# Patient Record
Sex: MALE | Race: WHITE | NOT HISPANIC OR LATINO | Employment: FULL TIME | URBAN - METROPOLITAN AREA
[De-identification: names, ages, dates, MRNs, and addresses within clinical notes are randomized per-mention and may not be internally consistent; named-entity substitution may affect disease eponyms.]

---

## 2021-10-14 ENCOUNTER — OFFICE VISIT (OUTPATIENT)
Dept: URGENT CARE | Facility: CLINIC | Age: 61
End: 2021-10-14
Payer: COMMERCIAL

## 2021-10-14 VITALS — OXYGEN SATURATION: 95 % | RESPIRATION RATE: 18 BRPM | TEMPERATURE: 98.2 F | HEART RATE: 81 BPM

## 2021-10-14 DIAGNOSIS — B34.9 VIRAL SYNDROME: Primary | ICD-10-CM

## 2021-10-14 PROCEDURE — 99204 OFFICE O/P NEW MOD 45 MIN: CPT | Performed by: PHYSICIAN ASSISTANT

## 2021-10-14 PROCEDURE — U0003 INFECTIOUS AGENT DETECTION BY NUCLEIC ACID (DNA OR RNA); SEVERE ACUTE RESPIRATORY SYNDROME CORONAVIRUS 2 (SARS-COV-2) (CORONAVIRUS DISEASE [COVID-19]), AMPLIFIED PROBE TECHNIQUE, MAKING USE OF HIGH THROUGHPUT TECHNOLOGIES AS DESCRIBED BY CMS-2020-01-R: HCPCS | Performed by: PHYSICIAN ASSISTANT

## 2021-10-14 PROCEDURE — U0005 INFEC AGEN DETEC AMPLI PROBE: HCPCS | Performed by: PHYSICIAN ASSISTANT

## 2021-10-15 LAB — SARS-COV-2 RNA RESP QL NAA+PROBE: NEGATIVE

## 2021-10-18 ENCOUNTER — TELEPHONE (OUTPATIENT)
Dept: URGENT CARE | Facility: CLINIC | Age: 61
End: 2021-10-18

## 2025-02-07 ENCOUNTER — HOSPITAL ENCOUNTER (INPATIENT)
Facility: HOSPITAL | Age: 65
LOS: 3 days | DRG: 377 | End: 2025-02-10
Attending: INTERNAL MEDICINE | Admitting: INTERNAL MEDICINE
Payer: COMMERCIAL

## 2025-02-07 ENCOUNTER — APPOINTMENT (EMERGENCY)
Dept: RADIOLOGY | Facility: HOSPITAL | Age: 65
DRG: 377 | End: 2025-02-07
Payer: COMMERCIAL

## 2025-02-07 ENCOUNTER — APPOINTMENT (EMERGENCY)
Dept: CT IMAGING | Facility: HOSPITAL | Age: 65
DRG: 377 | End: 2025-02-07
Payer: COMMERCIAL

## 2025-02-07 DIAGNOSIS — R04.2 COUGH WITH HEMOPTYSIS: ICD-10-CM

## 2025-02-07 DIAGNOSIS — R93.89 ABNORMAL CT OF THE CHEST: ICD-10-CM

## 2025-02-07 DIAGNOSIS — D62 ACUTE BLOOD LOSS ANEMIA: ICD-10-CM

## 2025-02-07 DIAGNOSIS — K92.1 MELENA: ICD-10-CM

## 2025-02-07 DIAGNOSIS — R57.9 SHOCK (HCC): Primary | ICD-10-CM

## 2025-02-07 DIAGNOSIS — J18.9 ACUTE PNEUMONIA: ICD-10-CM

## 2025-02-07 DIAGNOSIS — R65.21 SEPTIC SHOCK (HCC): ICD-10-CM

## 2025-02-07 DIAGNOSIS — D64.9 NORMOCYTIC ANEMIA: ICD-10-CM

## 2025-02-07 DIAGNOSIS — A41.9 SEPTIC SHOCK (HCC): ICD-10-CM

## 2025-02-07 DIAGNOSIS — K92.2 GI BLEED: ICD-10-CM

## 2025-02-07 PROBLEM — S22.43XD FRACTURE OF MULTIPLE RIBS OF BOTH SIDES WITH ROUTINE HEALING: Status: ACTIVE | Noted: 2025-02-07

## 2025-02-07 PROBLEM — S22.23XA STERNAL MANUBRIAL DISSOCIATION, CLOSED FRACTURE: Status: ACTIVE | Noted: 2025-02-07

## 2025-02-07 PROBLEM — I10 HYPERTENSION: Status: ACTIVE | Noted: 2025-02-07

## 2025-02-07 PROBLEM — E87.1 HYPONATREMIA: Status: ACTIVE | Noted: 2025-02-07

## 2025-02-07 LAB
2HR DELTA HS TROPONIN: 11 NG/L
4HR DELTA HS TROPONIN: 15 NG/L
ABO GROUP BLD: NORMAL
ABO GROUP BLD: NORMAL
ALBUMIN SERPL BCG-MCNC: 2.7 G/DL (ref 3.5–5)
ALP SERPL-CCNC: 66 U/L (ref 34–104)
ALT SERPL W P-5'-P-CCNC: 16 U/L (ref 7–52)
ANION GAP SERPL CALCULATED.3IONS-SCNC: 13 MMOL/L (ref 4–13)
APTT PPP: 27 SECONDS (ref 23–34)
AST SERPL W P-5'-P-CCNC: 12 U/L (ref 13–39)
BACTERIA UR QL AUTO: ABNORMAL /HPF
BASOPHILS # BLD AUTO: 0.02 THOUSANDS/ΜL (ref 0–0.1)
BASOPHILS NFR BLD AUTO: 0 % (ref 0–1)
BILIRUB DIRECT SERPL-MCNC: 0.05 MG/DL (ref 0–0.2)
BILIRUB SERPL-MCNC: 0.27 MG/DL (ref 0.2–1)
BILIRUB UR QL STRIP: NEGATIVE
BLD GP AB SCN SERPL QL: NEGATIVE
BUN SERPL-MCNC: 49 MG/DL (ref 5–25)
CALCIUM SERPL-MCNC: 8.3 MG/DL (ref 8.4–10.2)
CARDIAC TROPONIN I PNL SERPL HS: 17 NG/L (ref ?–50)
CARDIAC TROPONIN I PNL SERPL HS: 28 NG/L (ref ?–50)
CARDIAC TROPONIN I PNL SERPL HS: 32 NG/L (ref ?–50)
CHLORIDE SERPL-SCNC: 93 MMOL/L (ref 96–108)
CLARITY UR: CLEAR
CO2 SERPL-SCNC: 22 MMOL/L (ref 21–32)
COLOR UR: YELLOW
CREAT SERPL-MCNC: 1.07 MG/DL (ref 0.6–1.3)
EOSINOPHIL # BLD AUTO: 0 THOUSAND/ΜL (ref 0–0.61)
EOSINOPHIL NFR BLD AUTO: 0 % (ref 0–6)
ERYTHROCYTE [DISTWIDTH] IN BLOOD BY AUTOMATED COUNT: 13.6 % (ref 11.6–15.1)
FLUAV RNA RESP QL NAA+PROBE: NEGATIVE
FLUBV RNA RESP QL NAA+PROBE: NEGATIVE
GFR SERPL CREATININE-BSD FRML MDRD: 72 ML/MIN/1.73SQ M
GLUCOSE SERPL-MCNC: 201 MG/DL (ref 65–140)
GLUCOSE UR STRIP-MCNC: NEGATIVE MG/DL
HCT VFR BLD AUTO: 18.1 % (ref 36.5–49.3)
HCT VFR BLD AUTO: 19.4 % (ref 36.5–49.3)
HGB BLD-MCNC: 6 G/DL (ref 12–17)
HGB BLD-MCNC: 6.2 G/DL (ref 12–17)
HGB UR QL STRIP.AUTO: ABNORMAL
HYALINE CASTS #/AREA URNS LPF: ABNORMAL /LPF
IMM GRANULOCYTES # BLD AUTO: 0.32 THOUSAND/UL (ref 0–0.2)
IMM GRANULOCYTES NFR BLD AUTO: 1 % (ref 0–2)
INR PPP: 1.32 (ref 0.85–1.19)
KETONES UR STRIP-MCNC: NEGATIVE MG/DL
LACTATE SERPL-SCNC: 1.2 MMOL/L (ref 0.5–2)
LACTATE SERPL-SCNC: 6.8 MMOL/L (ref 0.5–2)
LEUKOCYTE ESTERASE UR QL STRIP: NEGATIVE
LIPASE SERPL-CCNC: 30 U/L (ref 11–82)
LYMPHOCYTES # BLD AUTO: 2.48 THOUSANDS/ΜL (ref 0.6–4.47)
LYMPHOCYTES NFR BLD AUTO: 11 % (ref 14–44)
MCH RBC QN AUTO: 29.5 PG (ref 26.8–34.3)
MCHC RBC AUTO-ENTMCNC: 32 G/DL (ref 31.4–37.4)
MCV RBC AUTO: 92 FL (ref 82–98)
MONOCYTES # BLD AUTO: 2.39 THOUSAND/ΜL (ref 0.17–1.22)
MONOCYTES NFR BLD AUTO: 11 % (ref 4–12)
NEUTROPHILS # BLD AUTO: 17.34 THOUSANDS/ΜL (ref 1.85–7.62)
NEUTS SEG NFR BLD AUTO: 77 % (ref 43–75)
NITRITE UR QL STRIP: NEGATIVE
NON-SQ EPI CELLS URNS QL MICRO: ABNORMAL /HPF
NRBC BLD AUTO-RTO: 0 /100 WBCS
PH UR STRIP.AUTO: 6 [PH]
PLATELET # BLD AUTO: 510 THOUSANDS/UL (ref 149–390)
PMV BLD AUTO: 9.1 FL (ref 8.9–12.7)
POTASSIUM SERPL-SCNC: 3.9 MMOL/L (ref 3.5–5.3)
PROCALCITONIN SERPL-MCNC: 0.98 NG/ML
PROT SERPL-MCNC: 5.7 G/DL (ref 6.4–8.4)
PROT UR STRIP-MCNC: NEGATIVE MG/DL
PROTHROMBIN TIME: 16.8 SECONDS (ref 12.3–15)
RBC # BLD AUTO: 2.1 MILLION/UL (ref 3.88–5.62)
RBC #/AREA URNS AUTO: ABNORMAL /HPF
RH BLD: POSITIVE
RH BLD: POSITIVE
RSV RNA RESP QL NAA+PROBE: NEGATIVE
SARS-COV-2 RNA RESP QL NAA+PROBE: NEGATIVE
SODIUM SERPL-SCNC: 128 MMOL/L (ref 135–147)
SP GR UR STRIP.AUTO: <=1.005
SPECIMEN EXPIRATION DATE: NORMAL
UROBILINOGEN UR QL STRIP.AUTO: 0.2 E.U./DL
WBC # BLD AUTO: 22.55 THOUSAND/UL (ref 4.31–10.16)
WBC #/AREA URNS AUTO: ABNORMAL /HPF

## 2025-02-07 PROCEDURE — 85025 COMPLETE CBC W/AUTO DIFF WBC: CPT | Performed by: PHYSICIAN ASSISTANT

## 2025-02-07 PROCEDURE — 85730 THROMBOPLASTIN TIME PARTIAL: CPT | Performed by: PHYSICIAN ASSISTANT

## 2025-02-07 PROCEDURE — 87449 NOS EACH ORGANISM AG IA: CPT

## 2025-02-07 PROCEDURE — 74174 CTA ABD&PLVS W/CONTRAST: CPT

## 2025-02-07 PROCEDURE — 36430 TRANSFUSION BLD/BLD COMPNT: CPT

## 2025-02-07 PROCEDURE — 86923 COMPATIBILITY TEST ELECTRIC: CPT

## 2025-02-07 PROCEDURE — 84145 PROCALCITONIN (PCT): CPT | Performed by: PHYSICIAN ASSISTANT

## 2025-02-07 PROCEDURE — 87081 CULTURE SCREEN ONLY: CPT

## 2025-02-07 PROCEDURE — 93005 ELECTROCARDIOGRAM TRACING: CPT

## 2025-02-07 PROCEDURE — 85018 HEMOGLOBIN: CPT

## 2025-02-07 PROCEDURE — 36415 COLL VENOUS BLD VENIPUNCTURE: CPT | Performed by: PHYSICIAN ASSISTANT

## 2025-02-07 PROCEDURE — P9016 RBC LEUKOCYTES REDUCED: HCPCS

## 2025-02-07 PROCEDURE — 71045 X-RAY EXAM CHEST 1 VIEW: CPT

## 2025-02-07 PROCEDURE — 96374 THER/PROPH/DIAG INJ IV PUSH: CPT

## 2025-02-07 PROCEDURE — 84484 ASSAY OF TROPONIN QUANT: CPT | Performed by: PHYSICIAN ASSISTANT

## 2025-02-07 PROCEDURE — 84484 ASSAY OF TROPONIN QUANT: CPT

## 2025-02-07 PROCEDURE — 96365 THER/PROPH/DIAG IV INF INIT: CPT

## 2025-02-07 PROCEDURE — 71275 CT ANGIOGRAPHY CHEST: CPT

## 2025-02-07 PROCEDURE — 99285 EMERGENCY DEPT VISIT HI MDM: CPT

## 2025-02-07 PROCEDURE — 86900 BLOOD TYPING SEROLOGIC ABO: CPT | Performed by: PHYSICIAN ASSISTANT

## 2025-02-07 PROCEDURE — 85014 HEMATOCRIT: CPT

## 2025-02-07 PROCEDURE — 99223 1ST HOSP IP/OBS HIGH 75: CPT

## 2025-02-07 PROCEDURE — 86901 BLOOD TYPING SEROLOGIC RH(D): CPT | Performed by: PHYSICIAN ASSISTANT

## 2025-02-07 PROCEDURE — 83605 ASSAY OF LACTIC ACID: CPT | Performed by: PHYSICIAN ASSISTANT

## 2025-02-07 PROCEDURE — 80076 HEPATIC FUNCTION PANEL: CPT | Performed by: PHYSICIAN ASSISTANT

## 2025-02-07 PROCEDURE — 99291 CRITICAL CARE FIRST HOUR: CPT | Performed by: PHYSICIAN ASSISTANT

## 2025-02-07 PROCEDURE — 0241U HB NFCT DS VIR RESP RNA 4 TRGT: CPT

## 2025-02-07 PROCEDURE — 80048 BASIC METABOLIC PNL TOTAL CA: CPT | Performed by: PHYSICIAN ASSISTANT

## 2025-02-07 PROCEDURE — 86850 RBC ANTIBODY SCREEN: CPT | Performed by: PHYSICIAN ASSISTANT

## 2025-02-07 PROCEDURE — 85610 PROTHROMBIN TIME: CPT | Performed by: PHYSICIAN ASSISTANT

## 2025-02-07 PROCEDURE — 87040 BLOOD CULTURE FOR BACTERIA: CPT | Performed by: PHYSICIAN ASSISTANT

## 2025-02-07 PROCEDURE — 81001 URINALYSIS AUTO W/SCOPE: CPT | Performed by: PHYSICIAN ASSISTANT

## 2025-02-07 PROCEDURE — 96361 HYDRATE IV INFUSION ADD-ON: CPT

## 2025-02-07 PROCEDURE — 83690 ASSAY OF LIPASE: CPT | Performed by: PHYSICIAN ASSISTANT

## 2025-02-07 PROCEDURE — 86920 COMPATIBILITY TEST SPIN: CPT

## 2025-02-07 RX ORDER — METOCLOPRAMIDE HYDROCHLORIDE 5 MG/ML
10 INJECTION INTRAMUSCULAR; INTRAVENOUS ONCE
Status: COMPLETED | OUTPATIENT
Start: 2025-02-08 | End: 2025-02-08

## 2025-02-07 RX ORDER — METOCLOPRAMIDE HYDROCHLORIDE 5 MG/ML
10 INJECTION INTRAMUSCULAR; INTRAVENOUS ONCE
Status: COMPLETED | OUTPATIENT
Start: 2025-02-07 | End: 2025-02-07

## 2025-02-07 RX ORDER — VANCOMYCIN HYDROCHLORIDE 1 G/200ML
15 INJECTION, SOLUTION INTRAVENOUS EVERY 24 HOURS
Status: DISCONTINUED | OUTPATIENT
Start: 2025-02-07 | End: 2025-02-07

## 2025-02-07 RX ORDER — VANCOMYCIN HYDROCHLORIDE 750 MG/150ML
750 INJECTION, SOLUTION INTRAVENOUS EVERY 12 HOURS
Status: DISCONTINUED | OUTPATIENT
Start: 2025-02-08 | End: 2025-02-08

## 2025-02-07 RX ORDER — CHLORHEXIDINE GLUCONATE ORAL RINSE 1.2 MG/ML
15 SOLUTION DENTAL EVERY 12 HOURS SCHEDULED
Status: DISCONTINUED | OUTPATIENT
Start: 2025-02-07 | End: 2025-02-10 | Stop reason: HOSPADM

## 2025-02-07 RX ORDER — PANTOPRAZOLE SODIUM 40 MG/10ML
40 INJECTION, POWDER, LYOPHILIZED, FOR SOLUTION INTRAVENOUS ONCE
Status: COMPLETED | OUTPATIENT
Start: 2025-02-07 | End: 2025-02-07

## 2025-02-07 RX ADMIN — SODIUM CHLORIDE 1000 ML: 0.9 INJECTION, SOLUTION INTRAVENOUS at 18:26

## 2025-02-07 RX ADMIN — NOREPINEPHRINE BITARTRATE 10 MCG/MIN: 1 INJECTION, SOLUTION, CONCENTRATE INTRAVENOUS at 19:51

## 2025-02-07 RX ADMIN — METOCLOPRAMIDE 10 MG: 5 INJECTION, SOLUTION INTRAMUSCULAR; INTRAVENOUS at 22:17

## 2025-02-07 RX ADMIN — SODIUM CHLORIDE 8 MG/HR: 9 INJECTION, SOLUTION INTRAVENOUS at 21:38

## 2025-02-07 RX ADMIN — PANTOPRAZOLE SODIUM 40 MG: 40 INJECTION, POWDER, FOR SOLUTION INTRAVENOUS at 19:15

## 2025-02-07 RX ADMIN — VANCOMYCIN HYDROCHLORIDE 1500 MG: 1 INJECTION, POWDER, LYOPHILIZED, FOR SOLUTION INTRAVENOUS at 21:00

## 2025-02-07 RX ADMIN — CHLORHEXIDINE GLUCONATE 15 ML: 1.2 SOLUTION ORAL at 22:17

## 2025-02-07 RX ADMIN — SODIUM CHLORIDE 1000 ML: 0.9 INJECTION, SOLUTION INTRAVENOUS at 18:20

## 2025-02-07 RX ADMIN — IOHEXOL 100 ML: 350 INJECTION, SOLUTION INTRAVENOUS at 19:09

## 2025-02-07 RX ADMIN — CEFEPIME 2000 MG: 2 INJECTION, POWDER, FOR SOLUTION INTRAVENOUS at 19:15
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
## 2025-02-08 ENCOUNTER — ANESTHESIA (OUTPATIENT)
Dept: ANESTHESIOLOGY | Facility: HOSPITAL | Age: 65
End: 2025-02-08

## 2025-02-08 ENCOUNTER — ANESTHESIA EVENT (INPATIENT)
Dept: GASTROENTEROLOGY | Facility: HOSPITAL | Age: 65
DRG: 377 | End: 2025-02-08
Payer: COMMERCIAL

## 2025-02-08 ENCOUNTER — ANESTHESIA (INPATIENT)
Dept: GASTROENTEROLOGY | Facility: HOSPITAL | Age: 65
DRG: 377 | End: 2025-02-08
Payer: COMMERCIAL

## 2025-02-08 ENCOUNTER — APPOINTMENT (INPATIENT)
Dept: GASTROENTEROLOGY | Facility: HOSPITAL | Age: 65
DRG: 377 | End: 2025-02-08
Attending: INTERNAL MEDICINE
Payer: COMMERCIAL

## 2025-02-08 ENCOUNTER — ANESTHESIA EVENT (OUTPATIENT)
Dept: ANESTHESIOLOGY | Facility: HOSPITAL | Age: 65
End: 2025-02-08

## 2025-02-08 PROBLEM — Z12.11 COLON CANCER SCREENING: Status: ACTIVE | Noted: 2025-02-08

## 2025-02-08 PROBLEM — K92.1 MELENA: Status: ACTIVE | Noted: 2025-02-08

## 2025-02-08 PROBLEM — F10.10 ALCOHOL ABUSE: Status: ACTIVE | Noted: 2025-02-08

## 2025-02-08 PROBLEM — R04.2 HEMOPTYSIS: Status: ACTIVE | Noted: 2025-02-08

## 2025-02-08 LAB
ABO GROUP BLD BPU: NORMAL
ALBUMIN SERPL BCG-MCNC: 2.6 G/DL (ref 3.5–5)
ALP SERPL-CCNC: 56 U/L (ref 34–104)
ALT SERPL W P-5'-P-CCNC: 13 U/L (ref 7–52)
ANION GAP SERPL CALCULATED.3IONS-SCNC: 3 MMOL/L (ref 4–13)
ANION GAP SERPL CALCULATED.3IONS-SCNC: 7 MMOL/L (ref 4–13)
AST SERPL W P-5'-P-CCNC: 11 U/L (ref 13–39)
BASOPHILS # BLD AUTO: 0.04 THOUSANDS/ΜL (ref 0–0.1)
BASOPHILS NFR BLD AUTO: 0 % (ref 0–1)
BILIRUB SERPL-MCNC: 0.68 MG/DL (ref 0.2–1)
BPU ID: NORMAL
BUN SERPL-MCNC: 32 MG/DL (ref 5–25)
BUN SERPL-MCNC: 40 MG/DL (ref 5–25)
CA-I BLD-SCNC: 1.06 MMOL/L (ref 1.12–1.32)
CA-I BLD-SCNC: 1.1 MMOL/L (ref 1.12–1.32)
CALCIUM ALBUM COR SERPL-MCNC: 9.4 MG/DL (ref 8.3–10.1)
CALCIUM SERPL-MCNC: 6.8 MG/DL (ref 8.4–10.2)
CALCIUM SERPL-MCNC: 8.3 MG/DL (ref 8.4–10.2)
CHLORIDE SERPL-SCNC: 102 MMOL/L (ref 96–108)
CHLORIDE SERPL-SCNC: 107 MMOL/L (ref 96–108)
CO2 SERPL-SCNC: 24 MMOL/L (ref 21–32)
CO2 SERPL-SCNC: 25 MMOL/L (ref 21–32)
CREAT SERPL-MCNC: 0.56 MG/DL (ref 0.6–1.3)
CREAT SERPL-MCNC: 0.73 MG/DL (ref 0.6–1.3)
CROSSMATCH: NORMAL
EOSINOPHIL # BLD AUTO: 0.02 THOUSAND/ΜL (ref 0–0.61)
EOSINOPHIL NFR BLD AUTO: 0 % (ref 0–6)
ERYTHROCYTE [DISTWIDTH] IN BLOOD BY AUTOMATED COUNT: 14.7 % (ref 11.6–15.1)
GFR SERPL CREATININE-BSD FRML MDRD: 109 ML/MIN/1.73SQ M
GFR SERPL CREATININE-BSD FRML MDRD: 98 ML/MIN/1.73SQ M
GLUCOSE SERPL-MCNC: 104 MG/DL (ref 65–140)
GLUCOSE SERPL-MCNC: 131 MG/DL (ref 65–140)
HCT VFR BLD AUTO: 27 % (ref 36.5–49.3)
HGB BLD-MCNC: 5.5 G/DL (ref 12–17)
HGB BLD-MCNC: 6.5 G/DL (ref 12–17)
HGB BLD-MCNC: 8.1 G/DL (ref 12–17)
HGB BLD-MCNC: 9.1 G/DL (ref 12–17)
IMM GRANULOCYTES # BLD AUTO: 0.16 THOUSAND/UL (ref 0–0.2)
IMM GRANULOCYTES NFR BLD AUTO: 1 % (ref 0–2)
L PNEUMO1 AG UR QL IA.RAPID: NEGATIVE
LYMPHOCYTES # BLD AUTO: 1.31 THOUSANDS/ΜL (ref 0.6–4.47)
LYMPHOCYTES NFR BLD AUTO: 6 % (ref 14–44)
MAGNESIUM SERPL-MCNC: 1.6 MG/DL (ref 1.9–2.7)
MAGNESIUM SERPL-MCNC: 2.1 MG/DL (ref 1.9–2.7)
MCH RBC QN AUTO: 29.2 PG (ref 26.8–34.3)
MCHC RBC AUTO-ENTMCNC: 33.7 G/DL (ref 31.4–37.4)
MCV RBC AUTO: 87 FL (ref 82–98)
MONOCYTES # BLD AUTO: 1.64 THOUSAND/ΜL (ref 0.17–1.22)
MONOCYTES NFR BLD AUTO: 7 % (ref 4–12)
NEUTROPHILS # BLD AUTO: 20.04 THOUSANDS/ΜL (ref 1.85–7.62)
NEUTS SEG NFR BLD AUTO: 86 % (ref 43–75)
NRBC BLD AUTO-RTO: 0 /100 WBCS
PHOSPHATE SERPL-MCNC: 2.3 MG/DL (ref 2.3–4.1)
PHOSPHATE SERPL-MCNC: 3.6 MG/DL (ref 2.3–4.1)
PLATELET # BLD AUTO: 369 THOUSANDS/UL (ref 149–390)
PMV BLD AUTO: 8.7 FL (ref 8.9–12.7)
POTASSIUM SERPL-SCNC: 3.9 MMOL/L (ref 3.5–5.3)
POTASSIUM SERPL-SCNC: 4 MMOL/L (ref 3.5–5.3)
PROCALCITONIN SERPL-MCNC: 1.05 NG/ML
PROT SERPL-MCNC: 5.2 G/DL (ref 6.4–8.4)
RBC # BLD AUTO: 3.12 MILLION/UL (ref 3.88–5.62)
S PNEUM AG UR QL: NEGATIVE
SODIUM SERPL-SCNC: 133 MMOL/L (ref 135–147)
SODIUM SERPL-SCNC: 135 MMOL/L (ref 135–147)
UNIT DISPENSE STATUS: NORMAL
UNIT PRODUCT CODE: NORMAL
UNIT PRODUCT VOLUME: 350 ML
UNIT RH: NORMAL
WBC # BLD AUTO: 23.21 THOUSAND/UL (ref 4.31–10.16)

## 2025-02-08 PROCEDURE — 80048 BASIC METABOLIC PNL TOTAL CA: CPT | Performed by: INTERNAL MEDICINE

## 2025-02-08 PROCEDURE — 84100 ASSAY OF PHOSPHORUS: CPT | Performed by: INTERNAL MEDICINE

## 2025-02-08 PROCEDURE — 43235 EGD DIAGNOSTIC BRUSH WASH: CPT | Performed by: INTERNAL MEDICINE

## 2025-02-08 PROCEDURE — 84100 ASSAY OF PHOSPHORUS: CPT

## 2025-02-08 PROCEDURE — 0DJ08ZZ INSPECTION OF UPPER INTESTINAL TRACT, VIA NATURAL OR ARTIFICIAL OPENING ENDOSCOPIC: ICD-10-PCS | Performed by: INTERNAL MEDICINE

## 2025-02-08 PROCEDURE — 99291 CRITICAL CARE FIRST HOUR: CPT | Performed by: INTERNAL MEDICINE

## 2025-02-08 PROCEDURE — P9016 RBC LEUKOCYTES REDUCED: HCPCS

## 2025-02-08 PROCEDURE — 85025 COMPLETE CBC W/AUTO DIFF WBC: CPT

## 2025-02-08 PROCEDURE — 85018 HEMOGLOBIN: CPT | Performed by: INTERNAL MEDICINE

## 2025-02-08 PROCEDURE — 80053 COMPREHEN METABOLIC PANEL: CPT

## 2025-02-08 PROCEDURE — 84145 PROCALCITONIN (PCT): CPT

## 2025-02-08 PROCEDURE — 83550 IRON BINDING TEST: CPT | Performed by: NURSE PRACTITIONER

## 2025-02-08 PROCEDURE — 83735 ASSAY OF MAGNESIUM: CPT | Performed by: INTERNAL MEDICINE

## 2025-02-08 PROCEDURE — 82330 ASSAY OF CALCIUM: CPT | Performed by: INTERNAL MEDICINE

## 2025-02-08 PROCEDURE — 82728 ASSAY OF FERRITIN: CPT | Performed by: NURSE PRACTITIONER

## 2025-02-08 PROCEDURE — 85018 HEMOGLOBIN: CPT | Performed by: NURSE PRACTITIONER

## 2025-02-08 PROCEDURE — 82746 ASSAY OF FOLIC ACID SERUM: CPT | Performed by: NURSE PRACTITIONER

## 2025-02-08 PROCEDURE — 83735 ASSAY OF MAGNESIUM: CPT

## 2025-02-08 PROCEDURE — 82607 VITAMIN B-12: CPT | Performed by: NURSE PRACTITIONER

## 2025-02-08 PROCEDURE — 83540 ASSAY OF IRON: CPT | Performed by: NURSE PRACTITIONER

## 2025-02-08 PROCEDURE — 82330 ASSAY OF CALCIUM: CPT

## 2025-02-08 RX ORDER — VALSARTAN AND HYDROCHLOROTHIAZIDE 160; 25 MG/1; MG/1
1 TABLET ORAL DAILY
COMMUNITY
Start: 2025-01-14 | End: 2025-02-19

## 2025-02-08 RX ORDER — SODIUM CHLORIDE, SODIUM LACTATE, POTASSIUM CHLORIDE, CALCIUM CHLORIDE 600; 310; 30; 20 MG/100ML; MG/100ML; MG/100ML; MG/100ML
125 INJECTION, SOLUTION INTRAVENOUS CONTINUOUS
Status: DISCONTINUED | OUTPATIENT
Start: 2025-02-08 | End: 2025-02-08

## 2025-02-08 RX ORDER — CALCIUM GLUCONATE 20 MG/ML
2 INJECTION, SOLUTION INTRAVENOUS ONCE
Status: COMPLETED | OUTPATIENT
Start: 2025-02-08 | End: 2025-02-08

## 2025-02-08 RX ORDER — PROPOFOL 10 MG/ML
INJECTION, EMULSION INTRAVENOUS AS NEEDED
Status: DISCONTINUED | OUTPATIENT
Start: 2025-02-08 | End: 2025-02-08

## 2025-02-08 RX ORDER — ONDANSETRON 2 MG/ML
4 INJECTION INTRAMUSCULAR; INTRAVENOUS EVERY 6 HOURS PRN
Status: DISCONTINUED | OUTPATIENT
Start: 2025-02-08 | End: 2025-02-10 | Stop reason: HOSPADM

## 2025-02-08 RX ORDER — LIDOCAINE HYDROCHLORIDE 20 MG/ML
INJECTION, SOLUTION EPIDURAL; INFILTRATION; INTRACAUDAL; PERINEURAL AS NEEDED
Status: DISCONTINUED | OUTPATIENT
Start: 2025-02-08 | End: 2025-02-08

## 2025-02-08 RX ORDER — SODIUM CHLORIDE 9 MG/ML
100 INJECTION, SOLUTION INTRAVENOUS CONTINUOUS
Status: DISCONTINUED | OUTPATIENT
Start: 2025-02-08 | End: 2025-02-09

## 2025-02-08 RX ORDER — MAGNESIUM SULFATE HEPTAHYDRATE 40 MG/ML
2 INJECTION, SOLUTION INTRAVENOUS ONCE
Status: COMPLETED | OUTPATIENT
Start: 2025-02-08 | End: 2025-02-08

## 2025-02-08 RX ORDER — CEFTRIAXONE 1 G/50ML
1000 INJECTION, SOLUTION INTRAVENOUS EVERY 24 HOURS
Status: DISCONTINUED | OUTPATIENT
Start: 2025-02-08 | End: 2025-02-10 | Stop reason: HOSPADM

## 2025-02-08 RX ORDER — PHENYLEPHRINE HCL IN 0.9% NACL 1 MG/10 ML
SYRINGE (ML) INTRAVENOUS AS NEEDED
Status: DISCONTINUED | OUTPATIENT
Start: 2025-02-08 | End: 2025-02-08

## 2025-02-08 RX ORDER — VANCOMYCIN HYDROCHLORIDE 1 G/200ML
1000 INJECTION, SOLUTION INTRAVENOUS EVERY 12 HOURS
Status: DISCONTINUED | OUTPATIENT
Start: 2025-02-08 | End: 2025-02-08

## 2025-02-08 RX ADMIN — CHLORHEXIDINE GLUCONATE 15 ML: 1.2 SOLUTION ORAL at 20:28

## 2025-02-08 RX ADMIN — CEFTRIAXONE 1000 MG: 1 INJECTION, SOLUTION INTRAVENOUS at 15:14

## 2025-02-08 RX ADMIN — SODIUM CHLORIDE 8 MG/HR: 9 INJECTION, SOLUTION INTRAVENOUS at 15:04

## 2025-02-08 RX ADMIN — METOCLOPRAMIDE 10 MG: 5 INJECTION, SOLUTION INTRAMUSCULAR; INTRAVENOUS at 04:54

## 2025-02-08 RX ADMIN — PROPOFOL 140 MG: 10 INJECTION, EMULSION INTRAVENOUS at 11:46

## 2025-02-08 RX ADMIN — CALCIUM GLUCONATE 2 G: 20 INJECTION, SOLUTION INTRAVENOUS at 16:36

## 2025-02-08 RX ADMIN — VANCOMYCIN HYDROCHLORIDE 1250 MG: 1 INJECTION, POWDER, LYOPHILIZED, FOR SOLUTION INTRAVENOUS at 12:17

## 2025-02-08 RX ADMIN — Medication 300 MCG: at 11:51

## 2025-02-08 RX ADMIN — SODIUM CHLORIDE 100 ML/HR: 0.9 INJECTION, SOLUTION INTRAVENOUS at 23:11

## 2025-02-08 RX ADMIN — SODIUM CHLORIDE 100 ML/HR: 0.9 INJECTION, SOLUTION INTRAVENOUS at 13:43

## 2025-02-08 RX ADMIN — Medication 100 MCG: at 11:46

## 2025-02-08 RX ADMIN — CHLORHEXIDINE GLUCONATE 15 ML: 1.2 SOLUTION ORAL at 08:38

## 2025-02-08 RX ADMIN — MAGNESIUM SULFATE HEPTAHYDRATE 2 G: 40 INJECTION, SOLUTION INTRAVENOUS at 08:38

## 2025-02-08 RX ADMIN — SODIUM CHLORIDE 8 MG/HR: 9 INJECTION, SOLUTION INTRAVENOUS at 05:59

## 2025-02-08 RX ADMIN — SODIUM CHLORIDE 500 ML: 0.9 INJECTION, SOLUTION INTRAVENOUS at 12:05

## 2025-02-08 RX ADMIN — ONDANSETRON 4 MG: 2 INJECTION INTRAMUSCULAR; INTRAVENOUS at 13:16

## 2025-02-08 RX ADMIN — SODIUM CHLORIDE, SODIUM LACTATE, POTASSIUM CHLORIDE, AND CALCIUM CHLORIDE 125 ML/HR: .6; .31; .03; .02 INJECTION, SOLUTION INTRAVENOUS at 11:03

## 2025-02-08 RX ADMIN — Medication 200 MCG: at 11:49

## 2025-02-08 RX ADMIN — LIDOCAINE HYDROCHLORIDE 100 MG: 20 INJECTION, SOLUTION EPIDURAL; INFILTRATION; INTRACAUDAL at 11:46

## 2025-02-08 RX ADMIN — SODIUM CHLORIDE 500 ML: 0.9 INJECTION, SOLUTION INTRAVENOUS at 20:28

## 2025-02-08 RX ADMIN — SODIUM CHLORIDE 8 MG/HR: 9 INJECTION, SOLUTION INTRAVENOUS at 23:24

## 2025-02-08 RX ADMIN — FOLIC ACID: 5 INJECTION, SOLUTION INTRAMUSCULAR; INTRAVENOUS; SUBCUTANEOUS at 15:58

## 2025-02-08 RX ADMIN — SODIUM PHOSPHATE, MONOBASIC, MONOHYDRATE AND SODIUM PHOSPHATE, DIBASIC, ANHYDROUS 9 MMOL: 142; 276 INJECTION, SOLUTION INTRAVENOUS at 09:23
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
## 2025-02-08 NOTE — ANESTHESIA PREPROCEDURE EVALUATION
Procedure:  EGD    Relevant Problems   CARDIO   (+) Primary hypertension      HEMATOLOGY   (+) Acute blood loss anemia      Digestive   (+) Melena      Orthopedic/Musculoskeletal   (+) Fracture of multiple ribs of both sides with routine healing      Other   (+) Hemoptysis   (+) Hyponatremia        Physical Exam    Airway    Mallampati score: I         Dental       Cardiovascular      Pulmonary      Other Findings        Anesthesia Plan  ASA Score- 3 Emergent    Anesthesia Type- IV sedation with anesthesia with ASA Monitors.         Additional Monitors:     Airway Plan:            Plan Factors-    Induction- intravenous.    Postoperative Plan-         Informed Consent- Anesthetic plan and risks discussed with patient.  I personally reviewed this patient with the CRNA. Discussed and agreed on the Anesthesia Plan with the CRNA..      NPO Status:  Vitals Value Taken Time   Date of last liquid 02/07/25 02/08/25 1111   Time of last liquid 2200 02/08/25 1111   Date of last solid 02/07/25 02/08/25 1111   Time of last solid 0900 02/08/25 1111

## 2025-02-08 NOTE — SEPSIS NOTE
"  Sepsis Note   Christian Liz 64 y.o. male MRN: [id number]  Unit/Bed#: ED 20 Encounter: [id number]       Initial Sepsis Screening       Row Name 02/07/25 1928                Is the patient's history suggestive of a new or worsening infection? Yes (Proceed)  -DE        Suspected source of infection pneumonia  -DE        Indicate SIRS criteria Hyperthemia > 38.3C (100.9F) OR Hypothermia <36C (96.8F);Tachycardia > 90 bpm;Leukocytosis (WBC > 97012 IJL) OR Leukopenia (WBC <4000 IJL) OR Bandemia (WBC >10% bands)  -DE        Are two or more of the above signs & symptoms of infection both present and new to the patient? Yes (Proceed)  -DE        Assess for evidence of organ dysfunction: Are any of the below criteria present within 6 hours of suspected infection and SIRS criteria that are NOT considered to be chronic conditions? Lactate >/equal 4.0  -DE        Date of presentation of septic shock 02/07/25  -DE        Time of presentation of septic shock 1928  -DE        Fluid Resuscitation: 30 ml/kg IV fluid bolus will be given based on actual body weight  -DE        Is the patient is persistently hypotensive in the hour after fluid bolus administration? If yes, patient meets criteria for vasopressor use. NO  -DE        Sepsis Note: Click \"NEXT\" below (NOT \"close\") to generate sepsis note based on above information. YES (proceed by clicking \"NEXT\")  -DE                  User Key  (r) = Recorded By, (t) = Taken By, (c) = Cosigned By      Initials Name Provider Type    DE Parag Ramirez PA-C Physician Assistant                        Body mass index is 20.38 kg/m².  Wt Readings from Last 1 Encounters:   02/07/25 62.6 kg (138 lb)     IBW (Ideal Body Weight): 70.7 kg    Ideal body weight: 70.7 kg (155 lb 13.8 oz)    "
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
## 2025-02-08 NOTE — QUICK NOTE
Patient and patient's son updated extensively at bedside. All questions/concerns were answered and addressed.
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
## 2025-02-08 NOTE — NURSING NOTE
1050- Pt had 1 small black/ bloody BM.    1055- pt escorted off ICU unit to OR w/ OR nurse for EGD. Protonix gtt infusing, VSS    1158- pt back to ICU from EGD, hypotensive and tachycardic- CRNP at bedside. Repeat Hgb 6.5. Pt experiencing multiple episodes of bright red emesis. NG tube placed and to suction. 2 u PRBCs ordered STAT.    1205- 500 NSS bolus infusing at this time: See MAR    1256- First unit PRBCs infusing at this time    1316- zofran given: See MAR    1345- second unit PRBCs infusing at this time    1400- NG output 500cc bright red/ serosanguineous drainage: removed per CRNP

## 2025-02-08 NOTE — PROGRESS NOTES
Christian Liz is a 64 y.o. male who is currently ordered Vancomycin IV with management by the Pharmacy Consult service.  Relevant clinical data and objective / subjective history reviewed.  Vancomycin Assessment:  Indication and Goal AUC/Trough: Pneumonia (goal -600, trough >10)  Clinical Status: stable  Micro:   pending  Renal Function:  SCr: 0.56 mg/dL  CrCl: 105.4 mL/min  Renal replacement: Not on dialysis  Days of Therapy: 2  Current Dose: 750mg IV Q12hrs  Vancomycin Plan:  New Dosinmg IV Q12hrs  Estimated AUC: 515 mcg*hr/mL  Estimated Trough: 11.3 mcg/mL  Next Level:  with am labs   Renal Function Monitoring: Daily BMP and UOP  Pharmacy will continue to follow closely for s/sx of nephrotoxicity, infusion reactions and appropriateness of therapy.  BMP and CBC will be ordered per protocol. We will continue to follow the patient’s culture results and clinical progress daily.    Negrita Kelly, Pharmacist
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
## 2025-02-08 NOTE — ASSESSMENT & PLAN NOTE
Pt states he had one episode of bright red blood tinged sputum at home as well as 6 episodes of dark-colored diarrhea that occurred this am.   Pt with a positive occult blood in the ED    Plan:  Hgb was noted to be 6.2. Pt was given 1 unit of PRBC in the ED with improvement in hemodynamics. Re-check Hgb 6.0; transfused an additional 2 units PRBC  Spoke with GI; will see pt at bedside tomorrow  Continue Protonix gtt  NPO after MN for possible scope 2/8  Will give a dose of Reglan 10 mg IV now and again at 0400  Trend Hgb and transfuse for Hgb <7.0  Monitor for any active signs of bleeding  GI consulted and following

## 2025-02-08 NOTE — NURSING NOTE
2030- Patient admitted to ICU, norepinephrine infusing at 10 mcg/min and blood infusing at 150 mL/hr.    2045- Rate of blood increased to 250 mL/hr    2100- Titrated norepinephrine down, to maintain MAP of 65, see MAR. Son at bedside, updated by this RN and LI Henriquez.    2130- First unit RBC completed.     2230- Hemoglobin 6.0, LI Henriquez made aware.    2301- Norepinephrine stopped, blood pressure within parameters, see flowsheets for vitals.    0000- Patient assessment completed, denies dizziness, double vision, or fatigue.    0005- 2 unit RBC started at 125 mL/hr    0035- Increased rate of blood to 250 mL/hr    0146- 2 unit RBC completed.     0200- 3 unit RBC started at rate of 150 mL/hr    0230- Patient had bowel movement ~ 300 mL of dark, black and red odorous stool with clots present. PROMISE Henriquez made aware and at bedside. Patient with improvement of color, less pale, denies dizziness, double vision, or fatigue. Rate of blood increased to 250 mL/hr    0315- 3 unit of RBC completed.    0600- Patient with another episode of ~ 250 mL of dark, black and red odorous stool, PROMISE Cunningham made aware and at bedside.

## 2025-02-08 NOTE — PLAN OF CARE
Problem: PAIN - ADULT  Goal: Verbalizes/displays adequate comfort level or baseline comfort level  Description: Interventions:  - Encourage patient to monitor pain and request assistance  - Assess pain using appropriate pain scale  - Administer analgesics based on type and severity of pain and evaluate response  - Implement non-pharmacological measures as appropriate and evaluate response  - Consider cultural and social influences on pain and pain management  - Notify physician/advanced practitioner if interventions unsuccessful or patient reports new pain  Outcome: Progressing     Problem: INFECTION - ADULT  Goal: Absence or prevention of progression during hospitalization  Description: INTERVENTIONS:  - Assess and monitor for signs and symptoms of infection  - Monitor lab/diagnostic results  - Monitor all insertion sites, i.e. indwelling lines, tubes, and drains  - Monitor endotracheal if appropriate and nasal secretions for changes in amount and color  - [place] appropriate cooling/warming therapies per order  - Administer medications as ordered  - Instruct and encourage patient and family to use good hand hygiene technique  - Identify and instruct in appropriate isolation precautions for identified infection/condition  Outcome: Progressing  Goal: Absence of fever/infection during neutropenic period  Description: INTERVENTIONS:  - Monitor WBC    Outcome: Progressing     Problem: SAFETY ADULT  Goal: Patient will remain free of falls  Description: INTERVENTIONS:  - Educate patient/family on patient safety including physical limitations  - Instruct patient to call for assistance with activity   - Consult OT/PT to assist with strengthening/mobility   - Keep Call bell within reach  - Keep bed low and locked with side rails adjusted as appropriate  - Keep care items and personal belongings within reach  - Initiate and maintain comfort rounds  - Make Fall Risk Sign visible to staff  - Offer Toileting every 2 Hours,  in advance of need  - Initiate/Maintain bed alarm  - Obtain necessary fall risk management equipment  - Apply yellow socks and bracelet for high fall risk patients  - Consider moving patient to room near nurses station  Outcome: Progressing  Goal: Maintain or return to baseline ADL function  Description: INTERVENTIONS:  -  Assess patient's ability to carry out ADLs; assess patient's baseline for ADL function and identify physical deficits which impact ability to perform ADLs (bathing, care of mouth/teeth, toileting, grooming, dressing, etc.)  - Assess/evaluate cause of self-care deficits   - Assess range of motion  - Assess patient's mobility; develop plan if impaired  - Assess patient's need for assistive devices and provide as appropriate  - Encourage maximum independence but intervene and supervise when necessary  - Involve family in performance of ADLs  - Assess for home care needs following discharge   - Consider OT consult to assist with ADL evaluation and planning for discharge  - Provide patient education as appropriate  Outcome: Progressing  Goal: Maintains/Returns to pre admission functional level  Description: INTERVENTIONS:  - Perform AM-PAC 6 Click Basic Mobility/ Daily Activity assessment daily.  - Set and communicate daily mobility goal to care team and patient/family/caregiver.   - Collaborate with rehabilitation services on mobility goals if consulted  - Perform Range of Motion 3 times a day.  - Reposition patient every 2 hours.  - Dangle patient 3 times a day  - Stand patient 3 times a day  - Ambulate patient 3 times a day  - Out of bed to chair 3 times a day   - Out of bed for meals 3 times a day  - Out of bed for toileting  - Record patient progress and toleration of activity level   Outcome: Progressing     Problem: DISCHARGE PLANNING  Goal: Discharge to home or other facility with appropriate resources  Description: INTERVENTIONS:  - Identify barriers to discharge w/patient and caregiver  -  Arrange for needed discharge resources and transportation as appropriate  - Identify discharge learning needs (meds, wound care, etc.)  - Arrange for interpretive services to assist at discharge as needed  - Refer to Case Management Department for coordinating discharge planning if the patient needs post-hospital services based on physician/advanced practitioner order or complex needs related to functional status, cognitive ability, or social support system  Outcome: Progressing     Problem: Knowledge Deficit  Goal: Patient/family/caregiver demonstrates understanding of disease process, treatment plan, medications, and discharge instructions  Description: Complete learning assessment and assess knowledge base.  Interventions:  - Provide teaching at level of understanding  - Provide teaching via preferred learning methods  Outcome: Progressing     Problem: CARDIOVASCULAR - ADULT  Goal: Maintains optimal cardiac output and hemodynamic stability  Description: INTERVENTIONS:  - Monitor I/O, vital signs and rhythm  - Monitor for S/S and trends of decreased cardiac output  - Administer and titrate ordered vasoactive medications to optimize hemodynamic stability  - Assess quality of pulses, skin color and temperature  - Assess for signs of decreased coronary artery perfusion  - Instruct patient to report change in severity of symptoms  Outcome: Progressing  Goal: Absence of cardiac dysrhythmias or at baseline rhythm  Description: INTERVENTIONS:  - Continuous cardiac monitoring, vital signs, obtain 12 lead EKG if ordered  - Administer antiarrhythmic and heart rate control medications as ordered  - Monitor electrolytes and administer replacement therapy as ordered  Outcome: Progressing     Problem: GASTROINTESTINAL - ADULT  Goal: Minimal or absence of nausea and/or vomiting  Description: INTERVENTIONS:  - Administer IV fluids if ordered to ensure adequate hydration  - Maintain NPO status until nausea and vomiting are  resolved  - Nasogastric tube if ordered  - Administer ordered antiemetic medications as needed  - Provide nonpharmacologic comfort measures as appropriate  - Advance diet as tolerated, if ordered  - Consider nutrition services referral to assist patient with adequate nutrition and appropriate food choices  Outcome: Progressing  Goal: Maintains or returns to baseline bowel function  Description: INTERVENTIONS:  - Assess bowel function  - Encourage oral fluids to ensure adequate hydration  - Administer IV fluids if ordered to ensure adequate hydration  - Administer ordered medications as needed  - Encourage mobilization and activity  - Consider nutritional services referral to assist patient with adequate nutrition and appropriate food choices  Outcome: Progressing  Goal: Maintains adequate nutritional intake  Description: INTERVENTIONS:  - Monitor percentage of each meal consumed  - Identify factors contributing to decreased intake, treat as appropriate  - Assist with meals as needed  - Monitor I&O, weight, and lab values if indicated  - Obtain nutrition services referral as needed  Outcome: Progressing  Goal: Establish and maintain optimal ostomy function  Description: INTERVENTIONS:  - Assess bowel function  - Encourage oral fluids to ensure adequate hydration  - Administer IV fluids if ordered to ensure adequate hydration   - Administer ordered medications as needed  - Encourage mobilization and activity  - Nutrition services referral to assist patient with appropriate food choices  - Assess stoma site  - Consider wound care consult   Outcome: Progressing  Goal: Oral mucous membranes remain intact  Description: INTERVENTIONS  - Assess oral mucosa and hygiene practices  - Implement preventative oral hygiene regimen  - Implement oral medicated treatments as ordered  - Initiate Nutrition services referral as needed  Outcome: Progressing     Problem: METABOLIC, FLUID AND ELECTROLYTES - ADULT  Goal: Electrolytes  maintained within normal limits  Description: INTERVENTIONS:  - Monitor labs and assess patient for signs and symptoms of electrolyte imbalances  - Administer electrolyte replacement as ordered  - Monitor response to electrolyte replacements, including repeat lab results as appropriate  - Instruct patient on fluid and nutrition as appropriate  Outcome: Progressing  Goal: Fluid balance maintained  Description: INTERVENTIONS:  - Monitor labs   - Monitor I/O and WT  - Instruct patient on fluid and nutrition as appropriate  - Assess for signs & symptoms of volume excess or deficit  Outcome: Progressing  Goal: Glucose maintained within target range  Description: INTERVENTIONS:  - Monitor Blood Glucose as ordered  - Assess for signs and symptoms of hyperglycemia and hypoglycemia  - Administer ordered medications to maintain glucose within target range  - Assess nutritional intake and initiate nutrition service referral as needed  Outcome: Progressing     Problem: SKIN/TISSUE INTEGRITY - ADULT  Goal: Skin Integrity remains intact(Skin Breakdown Prevention)  Description: Assess:  -Perform [place] assessment every shift  -Clean and moisturize skin   -Inspect skin when repositioning, toileting, and assisting with ADLS  -Assess under medical devices   -Assess extremities for adequate circulation and sensation     Bed Management:  -Have minimal linens on bed & keep smooth, unwrinkled  -Change linens as needed when moist or perspiring  -Avoid sitting or lying in one position for more than 2 hours while in bed  -Keep HOB at 30 degrees     Toileting:  -Offer bedside commode  -Assess for incontinence   -Use incontinent care products after each incontinent episode    Activity:  -Mobilize patient 3 times a day  -Encourage activity and walks on unit  -Encourage or provide ROM exercises   -Turn and reposition patient every 2 Hours  -Use appropriate equipment to lift or move patient in bed  -Instruct/ Assist with weight shifting when out  of bed in chair  -Consider limitation of chair time 2 hour intervals    Skin Care:  -Avoid use of baby powder, tape, friction and shearing, hot water or constrictive clothing  -Relieve pressure over bony prominences using mepelex  -Do not massage red bony areas    Next Steps:  -Teach patient strategies to minimize risks   -Consider consults to  interdisciplinary teams   Outcome: Progressing     Problem: HEMATOLOGIC - ADULT  Goal: Maintains hematologic stability  Description: INTERVENTIONS  - Assess for signs and symptoms of bleeding or hemorrhage  - Monitor labs  - Administer supportive blood products/factors as ordered and appropriate  Outcome: Progressing     Problem: MUSCULOSKELETAL - ADULT  Goal: Maintain or return mobility to safest level of function  Description: INTERVENTIONS:  - Assess patient's ability to carry out ADLs; assess patient's baseline for ADL function and identify physical deficits which impact ability to perform ADLs (bathing, care of mouth/teeth, toileting, grooming, dressing, etc.)  - Assess/evaluate cause of self-care deficits   - Assess range of motion  - Assess patient's mobility  - Assess patient's need for assistive devices and provide as appropriate  - Encourage maximum independence but intervene and supervise when necessary  - Involve family in performance of ADLs  - Assess for home care needs following discharge   - Consider OT consult to assist with ADL evaluation and planning for discharge  - Provide patient education as appropriate  Outcome: Progressing  Goal: Maintain proper alignment of affected body part  Description: INTERVENTIONS:  - Support, maintain and protect limb and body alignment  - Provide patient/ family with appropriate education  Outcome: Progressing     Problem: Nutrition/Hydration-ADULT  Goal: Nutrient/Hydration intake appropriate for improving, restoring or maintaining nutritional needs  Description: Monitor and assess patient's nutrition/hydration status for  malnutrition. Collaborate with interdisciplinary team and initiate plan and interventions as ordered.  Monitor patient's weight and dietary intake as ordered or per policy. Utilize nutrition screening tool and intervene as necessary. Determine patient's food preferences and provide high-protein, high-caloric foods as appropriate.     INTERVENTIONS:  - Monitor oral intake, urinary output, labs, and treatment plans  - Assess nutrition and hydration status and recommend course of action  - Evaluate amount of meals eaten  - Assist patient with eating if necessary   - Allow adequate time for meals  - Recommend/ encourage appropriate diets, oral nutritional supplements, and vitamin/mineral supplements  - Order, calculate, and assess calorie counts as needed  - Recommend, monitor, and adjust tube feedings and TPN/PPN based on assessed needs  - Assess need for intravenous fluids  - Provide specific nutrition/hydration education as appropriate  - Include patient/family/caregiver in decisions related to nutrition  Outcome: Progressing     Problem: Prexisting or High Potential for Compromised Skin Integrity  Goal: Skin integrity is maintained or improved  Description: INTERVENTIONS:  - Identify patients at risk for skin breakdown  - Assess and monitor skin integrity  - Assess and monitor nutrition and hydration status  - Monitor labs   - Assess for incontinence   - Turn and reposition patient  - Assist with mobility/ambulation  - Relieve pressure over bony prominences  - Avoid friction and shearing  - Provide appropriate hygiene as needed including keeping skin clean and dry  - Evaluate need for skin moisturizer/barrier cream  - Collaborate with interdisciplinary team   - Patient/family teaching  - Consider wound care consult   Outcome: Progressing
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
## 2025-02-08 NOTE — ASSESSMENT & PLAN NOTE
He has never previously had a colonoscopy so this should be scheduled as an outpatient for routine colon cancer screening.

## 2025-02-08 NOTE — PLAN OF CARE
Problem: PAIN - ADULT  Goal: Verbalizes/displays adequate comfort level or baseline comfort level  Description: Interventions:  - Encourage patient to monitor pain and request assistance  - Assess pain using appropriate pain scale  - Administer analgesics based on type and severity of pain and evaluate response  - Implement non-pharmacological measures as appropriate and evaluate response  - Consider cultural and social influences on pain and pain management  - Notify physician/advanced practitioner if interventions unsuccessful or patient reports new pain  Outcome: Progressing     Problem: INFECTION - ADULT  Goal: Absence or prevention of progression during hospitalization  Description: INTERVENTIONS:  - Assess and monitor for signs and symptoms of infection  - Monitor lab/diagnostic results  - Monitor all insertion sites, i.e. indwelling lines, tubes, and drains  - Monitor endotracheal if appropriate and nasal secretions for changes in amount and color  - Mobile appropriate cooling/warming therapies per order  - Administer medications as ordered  - Instruct and encourage patient and family to use good hand hygiene technique  - Identify and instruct in appropriate isolation precautions for identified infection/condition  Outcome: Progressing  Goal: Absence of fever/infection during neutropenic period  Description: INTERVENTIONS:  - Monitor WBC    Outcome: Progressing     Problem: SAFETY ADULT  Goal: Patient will remain free of falls  Description: INTERVENTIONS:  - Educate patient/family on patient safety including physical limitations  - Instruct patient to call for assistance with activity   - Consult OT/PT to assist with strengthening/mobility   - Keep Call bell within reach  - Keep bed low and locked with side rails adjusted as appropriate  - Keep care items and personal belongings within reach  - Initiate and maintain comfort rounds  - Make Fall Risk Sign visible to staff  - Offer Toileting every 2 Hours,  in advance of need  - Initiate/Maintain bed alarm  - Obtain necessary fall risk management equipment  - Apply yellow socks and bracelet for high fall risk patients  - Consider moving patient to room near nurses station  Outcome: Progressing  Goal: Maintain or return to baseline ADL function  Description: INTERVENTIONS:  -  Assess patient's ability to carry out ADLs; assess patient's baseline for ADL function and identify physical deficits which impact ability to perform ADLs (bathing, care of mouth/teeth, toileting, grooming, dressing, etc.)  - Assess/evaluate cause of self-care deficits   - Assess range of motion  - Assess patient's mobility; develop plan if impaired  - Assess patient's need for assistive devices and provide as appropriate  - Encourage maximum independence but intervene and supervise when necessary  - Involve family in performance of ADLs  - Assess for home care needs following discharge   - Consider OT consult to assist with ADL evaluation and planning for discharge  - Provide patient education as appropriate  Outcome: Progressing  Goal: Maintains/Returns to pre admission functional level  Description: INTERVENTIONS:  - Perform AM-PAC 6 Click Basic Mobility/ Daily Activity assessment daily.  - Set and communicate daily mobility goal to care team and patient/family/caregiver.   - Collaborate with rehabilitation services on mobility goals if consulted  - Perform Range of Motion 6 times a day.  - Reposition patient every 2 hours.  - Dangle patient 3 times a day  - Stand patient 3 times a day  - Ambulate patient 3 times a day  - Out of bed to chair 3 times a day   - Out of bed for meals 3 times a day  - Out of bed for toileting  - Record patient progress and toleration of activity level   Outcome: Progressing     Problem: Knowledge Deficit  Goal: Patient/family/caregiver demonstrates understanding of disease process, treatment plan, medications, and discharge instructions  Description: Complete  learning assessment and assess knowledge base.  Interventions:  - Provide teaching at level of understanding  - Provide teaching via preferred learning methods  Outcome: Progressing     Problem: CARDIOVASCULAR - ADULT  Goal: Maintains optimal cardiac output and hemodynamic stability  Description: INTERVENTIONS:  - Monitor I/O, vital signs and rhythm  - Monitor for S/S and trends of decreased cardiac output  - Administer and titrate ordered vasoactive medications to optimize hemodynamic stability  - Assess quality of pulses, skin color and temperature  - Assess for signs of decreased coronary artery perfusion  - Instruct patient to report change in severity of symptoms  Outcome: Progressing  Goal: Absence of cardiac dysrhythmias or at baseline rhythm  Description: INTERVENTIONS:  - Continuous cardiac monitoring, vital signs, obtain 12 lead EKG if ordered  - Administer antiarrhythmic and heart rate control medications as ordered  - Monitor electrolytes and administer replacement therapy as ordered  Outcome: Progressing     Problem: GASTROINTESTINAL - ADULT  Goal: Minimal or absence of nausea and/or vomiting  Description: INTERVENTIONS:  - Administer IV fluids if ordered to ensure adequate hydration  - Maintain NPO status until nausea and vomiting are resolved  - Nasogastric tube if ordered  - Administer ordered antiemetic medications as needed  - Provide nonpharmacologic comfort measures as appropriate  - Advance diet as tolerated, if ordered  - Consider nutrition services referral to assist patient with adequate nutrition and appropriate food choices  Outcome: Progressing  Goal: Maintains or returns to baseline bowel function  Description: INTERVENTIONS:  - Assess bowel function  - Encourage oral fluids to ensure adequate hydration  - Administer IV fluids if ordered to ensure adequate hydration  - Administer ordered medications as needed  - Encourage mobilization and activity  - Consider nutritional services referral  to assist patient with adequate nutrition and appropriate food choices  Outcome: Progressing  Goal: Maintains adequate nutritional intake  Description: INTERVENTIONS:  - Monitor percentage of each meal consumed  - Identify factors contributing to decreased intake, treat as appropriate  - Assist with meals as needed  - Monitor I&O, weight, and lab values if indicated  - Obtain nutrition services referral as needed  Outcome: Progressing  Goal: Establish and maintain optimal ostomy function  Description: INTERVENTIONS:  - Assess bowel function  - Encourage oral fluids to ensure adequate hydration  - Administer IV fluids if ordered to ensure adequate hydration   - Administer ordered medications as needed  - Encourage mobilization and activity  - Nutrition services referral to assist patient with appropriate food choices  - Assess stoma site  - Consider wound care consult   Outcome: Progressing  Goal: Oral mucous membranes remain intact  Description: INTERVENTIONS  - Assess oral mucosa and hygiene practices  - Implement preventative oral hygiene regimen  - Implement oral medicated treatments as ordered  - Initiate Nutrition services referral as needed  Outcome: Progressing     Problem: METABOLIC, FLUID AND ELECTROLYTES - ADULT  Goal: Electrolytes maintained within normal limits  Description: INTERVENTIONS:  - Monitor labs and assess patient for signs and symptoms of electrolyte imbalances  - Administer electrolyte replacement as ordered  - Monitor response to electrolyte replacements, including repeat lab results as appropriate  - Instruct patient on fluid and nutrition as appropriate  Outcome: Progressing  Goal: Fluid balance maintained  Description: INTERVENTIONS:  - Monitor labs   - Monitor I/O and WT  - Instruct patient on fluid and nutrition as appropriate  - Assess for signs & symptoms of volume excess or deficit  Outcome: Progressing  Goal: Glucose maintained within target range  Description: INTERVENTIONS:  -  Monitor Blood Glucose as ordered  - Assess for signs and symptoms of hyperglycemia and hypoglycemia  - Administer ordered medications to maintain glucose within target range  - Assess nutritional intake and initiate nutrition service referral as needed  Outcome: Progressing     Problem: HEMATOLOGIC - ADULT  Goal: Maintains hematologic stability  Description: INTERVENTIONS  - Assess for signs and symptoms of bleeding or hemorrhage  - Monitor labs  - Administer supportive blood products/factors as ordered and appropriate  Outcome: Progressing     Problem: MUSCULOSKELETAL - ADULT  Goal: Maintain or return mobility to safest level of function  Description: INTERVENTIONS:  - Assess patient's ability to carry out ADLs; assess patient's baseline for ADL function and identify physical deficits which impact ability to perform ADLs (bathing, care of mouth/teeth, toileting, grooming, dressing, etc.)  - Assess/evaluate cause of self-care deficits   - Assess range of motion  - Assess patient's mobility  - Assess patient's need for assistive devices and provide as appropriate  - Encourage maximum independence but intervene and supervise when necessary  - Involve family in performance of ADLs  - Assess for home care needs following discharge   - Consider OT consult to assist with ADL evaluation and planning for discharge  - Provide patient education as appropriate  Outcome: Progressing  Goal: Maintain proper alignment of affected body part  Description: INTERVENTIONS:  - Support, maintain and protect limb and body alignment  - Provide patient/ family with appropriate education  Outcome: Progressing     Problem: Nutrition/Hydration-ADULT  Goal: Nutrient/Hydration intake appropriate for improving, restoring or maintaining nutritional needs  Description: Monitor and assess patient's nutrition/hydration status for malnutrition. Collaborate with interdisciplinary team and initiate plan and interventions as ordered.  Monitor patient's  weight and dietary intake as ordered or per policy. Utilize nutrition screening tool and intervene as necessary. Determine patient's food preferences and provide high-protein, high-caloric foods as appropriate.     INTERVENTIONS:  - Monitor oral intake, urinary output, labs, and treatment plans  - Assess nutrition and hydration status and recommend course of action  - Evaluate amount of meals eaten  - Assist patient with eating if necessary   - Allow adequate time for meals  - Recommend/ encourage appropriate diets, oral nutritional supplements, and vitamin/mineral supplements  - Order, calculate, and assess calorie counts as needed  - Recommend, monitor, and adjust tube feedings and TPN/PPN based on assessed needs  - Assess need for intravenous fluids  - Provide specific nutrition/hydration education as appropriate  - Include patient/family/caregiver in decisions related to nutrition  Outcome: Progressing     Problem: DISCHARGE PLANNING  Goal: Discharge to home or other facility with appropriate resources  Description: INTERVENTIONS:  - Identify barriers to discharge w/patient and caregiver  - Arrange for needed discharge resources and transportation as appropriate  - Identify discharge learning needs (meds, wound care, etc.)  - Arrange for interpretive services to assist at discharge as needed  - Refer to Case Management Department for coordinating discharge planning if the patient needs post-hospital services based on physician/advanced practitioner order or complex needs related to functional status, cognitive ability, or social support system  Outcome: Progressing
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
## 2025-02-08 NOTE — PROGRESS NOTES
Christian Liz is a 64 y.o. male who is currently ordered Vancomycin IV with management by the Pharmacy Consult service.  Relevant clinical data and objective / subjective history reviewed.  Vancomycin Assessment:  Indication and Goal AUC/Trough: Pneumonia (goal -600, trough >10)  Clinical Status: worsening  Micro:   pending  Renal Function:  SCr: 1.07 mg/dL  CrCl: 55.3 mL/min  Renal replacement: Not on dialysis  Days of Therapy: 1  Current Dose: 1000 mg iv q 24 hrs  Vancomycin Plan:  New Dosin mg iv once, followed by 750 mg iv q 12 hrs  Estimated AUC: 557 mcg*hr/mL  Estimated Trough: 16.3 mcg/mL  Next Level: 0600 on 25  Renal Function Monitoring: Daily BMP and UOP  Pharmacy will continue to follow closely for s/sx of nephrotoxicity, infusion reactions and appropriateness of therapy.  BMP and CBC will be ordered per protocol. We will continue to follow the patient’s culture results and clinical progress daily.    [place] Shane, Pharmacist
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
## 2025-02-08 NOTE — CONSULTS
Consultation - Gastroenterology   Name: Christian Liz 64 y.o. male I MRN: [id number]  Unit/Bed#: ICU 10-01 I Date of Admission: 2/7/2025   Date of Service: 2/8/2025 I Hospital Day: 1   Inpatient consult to gastroenterology  Consult performed by: Jamil Juarez MD  Consult ordered by: LI Catherine        Physician Requesting Evaluation: Christian Weems MD   Reason for Evaluation / Principal Problem: Melena and anemia    Assessment & Plan  Acute blood loss anemia  He has acute blood loss anemia and melena likely due to pulmonary bleeding with swallowed blood or an upper GI source of bleeding such as peptic ulcer disease.  We will keep him n.p.o. for an upper endoscopy today and treat with intravenous Protonix.  Will also give intravenous Reglan to help clear his stomach and improve visualization.  Melena  See above  Colon cancer screening  He has never previously had a colonoscopy so this should be scheduled as an outpatient for routine colon cancer screening.      History of Present Illness   HPI:  Christian Liz is a 64 y.o. male who presents for evaluation because of cough, hemoptysis, lightheadedness, and melena.  He is hypotensive and tachycardic with an elevated white blood count and anemia as his hemoglobin is 6.2.  Since coming to the hospital he is continue to have black-colored stools but does not have any red blood in his stool or hematemesis.  He also denies abdominal pain, reflux, difficulty swallowing, and weight loss.  Of note he had imaging concerning for right-sided pneumonia with possible mass versus hemorrhage.    He denies prior history of upper endoscopy or colonoscopy and any family history of colon cancer or colon polyps..    Review of Systems   Constitutional:  Positive for fatigue. Negative for chills, fever and unexpected weight change.   HENT:  Negative for trouble swallowing.    Eyes:  Negative for visual disturbance.   Respiratory:  Positive for cough. Negative for  shortness of breath.    Cardiovascular:  Negative for chest pain.   Gastrointestinal:  Positive for blood in stool and diarrhea. Negative for abdominal distention, abdominal pain, constipation, nausea and vomiting.   Musculoskeletal:  Negative for arthralgias, gait problem and myalgias.   Skin:  Negative for pallor and rash.   Neurological:  Positive for dizziness and weakness. Negative for headaches.   Hematological:  Negative for adenopathy. Does not bruise/bleed easily.   Psychiatric/Behavioral:  Negative for dysphoric mood. The patient is not nervous/anxious.      I have reviewed the patient's PMH, PSH, Social History, Family History, Meds, and Allergies  Historical Information   History reviewed. No pertinent past medical history.  History reviewed. No pertinent surgical history.  Social History     Tobacco Use    Smoking status: Every Day     Current packs/day: 0.25     Types: Cigarettes    Smokeless tobacco: Never   Vaping Use    Vaping status: Never Used   Substance and Sexual Activity    Alcohol use: Not Currently     Comment: 2x weekly    Drug use: Never    Sexual activity: Not on file     E-Cigarette/Vaping    E-Cigarette Use Never User      E-Cigarette/Vaping Substances     Family history non-contributory    Objective :  Temp:  [95.4 °F (35.2 °C)-99.9 °F (37.7 °C)] 99.1 °F (37.3 °C)  HR:  [] 87  BP: ()/(51-79) 115/59  Resp:  [14-34] 15  SpO2:  [89 %-100 %] 95 %  O2 Device: Nasal cannula  Nasal Cannula O2 Flow Rate (L/min):  [2 L/min] 2 L/min    Physical Exam  Constitutional:       Appearance: He is well-developed.   HENT:      Head: Normocephalic and atraumatic.   Eyes:      General: No scleral icterus.     Conjunctiva/sclera: Conjunctivae normal.   Cardiovascular:      Rate and Rhythm: Normal rate and regular rhythm.      Heart sounds: Normal heart sounds.   Pulmonary:      Effort: Pulmonary effort is normal.      Breath sounds: Wheezing and rhonchi present.   Abdominal:      General: Bowel  sounds are normal. There is no distension.      Palpations: Abdomen is soft. There is no mass.      Tenderness: There is no abdominal tenderness. There is no guarding or rebound.   Musculoskeletal:         General: Normal range of motion.      Cervical back: Normal range of motion and neck supple.   Lymphadenopathy:      Cervical: No cervical adenopathy.   Skin:     General: Skin is warm and dry.      Findings: No rash.   Neurological:      Mental Status: He is alert and oriented to person, place, and time.           Lab Results: I have reviewed the following results:none
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
## 2025-02-08 NOTE — ASSESSMENT & PLAN NOTE
Pt states he had one episode of bright red blood tinged sputum at home as well as 6 episodes of dark-colored diarrhea that occurred this am.   Pt with a positive occult blood in the ED    Plan:  Hgb was noted to be 6.2. Pt was given 1 unit of PRBC in the ED with improvement in hemodynamics. Re-check Hgb pending and will likely be given an additional unit overnight.  Spoke with GI; will see pt at bedside tomorrow  Protonix gtt  NPO after MN for possible scope 2/8  Will give a dose of Reglan 10 mg IV now and again at 0400  Trend Hgb and transfuse for Hgb <7.0  Monitor for any active signs of bleeding  GI consulted and following

## 2025-02-08 NOTE — ANESTHESIA POSTPROCEDURE EVALUATION
Post-Op Assessment Note    CV Status:  Stable  Pain Score: 0    Pain management: adequate       Mental Status:  Alert and awake   Hydration Status:  Euvolemic   PONV Controlled:  Controlled   Airway Patency:  Patent     Post Op Vitals Reviewed: Yes    No anethesia notable event occurred.    Staff: CRNA, Anesthesiologist           Last Filed PACU Vitals:  Vitals Value Taken Time   Temp     Pulse 118 02/08/25 1201   BP 80/54 02/08/25 1200   Resp 22 02/08/25 1201   SpO2 97 % 02/08/25 1201   Vitals shown include unfiled device data.    PT transferred to ICU on monitors. Report to RN at bedside, vss all questions answered

## 2025-02-08 NOTE — ASSESSMENT & PLAN NOTE
Likely in setting of volume depletion 2/2 Acute GI Bleed  Will likely improve post transfusion    Plan:  Trend Na levels

## 2025-02-08 NOTE — ASSESSMENT & PLAN NOTE
He has acute blood loss anemia and melena likely due to pulmonary bleeding with swallowed blood or an upper GI source of bleeding such as peptic ulcer disease.  We will keep him n.p.o. for an upper endoscopy today and treat with intravenous Protonix.  Will also give intravenous Reglan to help clear his stomach and improve visualization.
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
## 2025-02-09 ENCOUNTER — APPOINTMENT (INPATIENT)
Dept: GASTROENTEROLOGY | Facility: HOSPITAL | Age: 65
DRG: 377 | End: 2025-02-09
Attending: INTERNAL MEDICINE
Payer: COMMERCIAL

## 2025-02-09 ENCOUNTER — ANESTHESIA (INPATIENT)
Dept: GASTROENTEROLOGY | Facility: HOSPITAL | Age: 65
DRG: 377 | End: 2025-02-09
Payer: COMMERCIAL

## 2025-02-09 ENCOUNTER — ANESTHESIA EVENT (INPATIENT)
Dept: GASTROENTEROLOGY | Facility: HOSPITAL | Age: 65
DRG: 377 | End: 2025-02-09
Payer: COMMERCIAL

## 2025-02-09 LAB
ABO GROUP BLD BPU: NORMAL
ALBUMIN SERPL BCG-MCNC: 1.7 G/DL (ref 3.5–5)
ALP SERPL-CCNC: 31 U/L (ref 34–104)
ALT SERPL W P-5'-P-CCNC: 9 U/L (ref 7–52)
ANION GAP SERPL CALCULATED.3IONS-SCNC: 2 MMOL/L (ref 4–13)
ANION GAP SERPL CALCULATED.3IONS-SCNC: 3 MMOL/L (ref 4–13)
AST SERPL W P-5'-P-CCNC: 10 U/L (ref 13–39)
ATRIAL RATE: 102 BPM
ATRIAL RATE: 103 BPM
ATRIAL RATE: 118 BPM
BASE EX.OXY STD BLDV CALC-SCNC: 91.5 % (ref 60–80)
BASE EXCESS BLDV CALC-SCNC: -6.2 MMOL/L
BILIRUB SERPL-MCNC: 0.39 MG/DL (ref 0.2–1)
BPU ID: NORMAL
BUN SERPL-MCNC: 33 MG/DL (ref 5–25)
BUN SERPL-MCNC: 35 MG/DL (ref 5–25)
CA-I BLD-SCNC: 1.1 MMOL/L (ref 1.12–1.32)
CALCIUM ALBUM COR SERPL-MCNC: 9 MG/DL (ref 8.3–10.1)
CALCIUM SERPL-MCNC: 7.2 MG/DL (ref 8.4–10.2)
CALCIUM SERPL-MCNC: 8.2 MG/DL (ref 8.4–10.2)
CHLORIDE SERPL-SCNC: 110 MMOL/L (ref 96–108)
CHLORIDE SERPL-SCNC: 110 MMOL/L (ref 96–108)
CO2 SERPL-SCNC: 22 MMOL/L (ref 21–32)
CO2 SERPL-SCNC: 24 MMOL/L (ref 21–32)
CREAT SERPL-MCNC: 0.61 MG/DL (ref 0.6–1.3)
CREAT SERPL-MCNC: 0.63 MG/DL (ref 0.6–1.3)
CROSSMATCH: NORMAL
ERYTHROCYTE [DISTWIDTH] IN BLOOD BY AUTOMATED COUNT: 15.3 % (ref 11.6–15.1)
ERYTHROCYTE [DISTWIDTH] IN BLOOD BY AUTOMATED COUNT: 15.7 % (ref 11.6–15.1)
FERRITIN SERPL-MCNC: 521 NG/ML (ref 24–336)
FOLATE SERPL-MCNC: 7 NG/ML
GFR SERPL CREATININE-BSD FRML MDRD: 104 ML/MIN/1.73SQ M
GFR SERPL CREATININE-BSD FRML MDRD: 105 ML/MIN/1.73SQ M
GLUCOSE SERPL-MCNC: 111 MG/DL (ref 65–140)
GLUCOSE SERPL-MCNC: 133 MG/DL (ref 65–140)
GLUCOSE SERPL-MCNC: 138 MG/DL (ref 65–140)
GLUCOSE SERPL-MCNC: 147 MG/DL (ref 65–140)
GLUCOSE SERPL-MCNC: 148 MG/DL (ref 65–140)
HCO3 BLDV-SCNC: 20.8 MMOL/L (ref 24–30)
HCT VFR BLD AUTO: 20.5 % (ref 36.5–49.3)
HCT VFR BLD AUTO: 32.5 % (ref 36.5–49.3)
HGB BLD-MCNC: 10.1 G/DL (ref 12–17)
HGB BLD-MCNC: 10.6 G/DL (ref 12–17)
HGB BLD-MCNC: 6.8 G/DL (ref 12–17)
HGB BLD-MCNC: 7.6 G/DL (ref 12–17)
HGB BLD-MCNC: 7.8 G/DL (ref 12–17)
HGB BLD-MCNC: 8 G/DL (ref 12–17)
HGB BLD-MCNC: 8.3 G/DL (ref 12–17)
INR PPP: 1.38 (ref 0.85–1.19)
IRON SATN MFR SERPL: 73 % (ref 15–50)
IRON SERPL-MCNC: 97 UG/DL (ref 50–212)
LACTATE SERPL-SCNC: 1.6 MMOL/L (ref 0.5–2)
MAGNESIUM SERPL-MCNC: 1.8 MG/DL (ref 1.9–2.7)
MCH RBC QN AUTO: 29.4 PG (ref 26.8–34.3)
MCH RBC QN AUTO: 30.5 PG (ref 26.8–34.3)
MCHC RBC AUTO-ENTMCNC: 32.6 G/DL (ref 31.4–37.4)
MCHC RBC AUTO-ENTMCNC: 33.2 G/DL (ref 31.4–37.4)
MCV RBC AUTO: 89 FL (ref 82–98)
MCV RBC AUTO: 94 FL (ref 82–98)
MRSA NOSE QL CULT: NORMAL
NASAL CANNULA: 2
O2 CT BLDV-SCNC: 14.3 ML/DL
P AXIS: 85 DEGREES
P AXIS: 85 DEGREES
P AXIS: 86 DEGREES
PCO2 BLDV: 47.5 MM HG (ref 42–50)
PH BLDV: 7.26 [PH] (ref 7.3–7.4)
PHOSPHATE SERPL-MCNC: 3.4 MG/DL (ref 2.3–4.1)
PLATELET # BLD AUTO: 142 THOUSANDS/UL (ref 149–390)
PLATELET # BLD AUTO: 186 THOUSANDS/UL (ref 149–390)
PMV BLD AUTO: 9.3 FL (ref 8.9–12.7)
PMV BLD AUTO: 9.4 FL (ref 8.9–12.7)
PO2 BLDV: 71 MM HG (ref 35–45)
POTASSIUM SERPL-SCNC: 4.3 MMOL/L (ref 3.5–5.3)
POTASSIUM SERPL-SCNC: 4.4 MMOL/L (ref 3.5–5.3)
PR INTERVAL: 130 MS
PR INTERVAL: 130 MS
PR INTERVAL: 132 MS
PROCALCITONIN SERPL-MCNC: 1.19 NG/ML
PROT SERPL-MCNC: 3.4 G/DL (ref 6.4–8.4)
PROTHROMBIN TIME: 17.4 SECONDS (ref 12.3–15)
QRS AXIS: 83 DEGREES
QRS AXIS: 83 DEGREES
QRS AXIS: 86 DEGREES
QRSD INTERVAL: 88 MS
QRSD INTERVAL: 90 MS
QRSD INTERVAL: 92 MS
QT INTERVAL: 342 MS
QT INTERVAL: 350 MS
QT INTERVAL: 352 MS
QTC INTERVAL: 456 MS
QTC INTERVAL: 461 MS
QTC INTERVAL: 479 MS
RBC # BLD AUTO: 2.31 MILLION/UL (ref 3.88–5.62)
RBC # BLD AUTO: 3.47 MILLION/UL (ref 3.88–5.62)
SODIUM SERPL-SCNC: 135 MMOL/L (ref 135–147)
SODIUM SERPL-SCNC: 136 MMOL/L (ref 135–147)
T WAVE AXIS: 73 DEGREES
T WAVE AXIS: 77 DEGREES
T WAVE AXIS: 78 DEGREES
TIBC SERPL-MCNC: 133 UG/DL (ref 250–450)
TRANSFERRIN SERPL-MCNC: 95 MG/DL (ref 203–362)
UIBC SERPL-MCNC: <55 UG/DL (ref 155–355)
UNIT DISPENSE STATUS: NORMAL
UNIT PRODUCT CODE: NORMAL
UNIT PRODUCT VOLUME: 350 ML
UNIT RH: NORMAL
VENTRICULAR RATE: 102 BPM
VENTRICULAR RATE: 103 BPM
VENTRICULAR RATE: 118 BPM
VIT B12 SERPL-MCNC: 358 PG/ML (ref 180–914)
WBC # BLD AUTO: 16.52 THOUSAND/UL (ref 4.31–10.16)
WBC # BLD AUTO: 19.02 THOUSAND/UL (ref 4.31–10.16)

## 2025-02-09 PROCEDURE — 85027 COMPLETE CBC AUTOMATED: CPT | Performed by: INTERNAL MEDICINE

## 2025-02-09 PROCEDURE — 93010 ELECTROCARDIOGRAM REPORT: CPT | Performed by: INTERNAL MEDICINE

## 2025-02-09 PROCEDURE — 80053 COMPREHEN METABOLIC PANEL: CPT | Performed by: INTERNAL MEDICINE

## 2025-02-09 PROCEDURE — 82805 BLOOD GASES W/O2 SATURATION: CPT | Performed by: NURSE PRACTITIONER

## 2025-02-09 PROCEDURE — 85018 HEMOGLOBIN: CPT | Performed by: INTERNAL MEDICINE

## 2025-02-09 PROCEDURE — 85018 HEMOGLOBIN: CPT | Performed by: NURSE PRACTITIONER

## 2025-02-09 PROCEDURE — 99233 SBSQ HOSP IP/OBS HIGH 50: CPT | Performed by: INTERNAL MEDICINE

## 2025-02-09 PROCEDURE — 85027 COMPLETE CBC AUTOMATED: CPT | Performed by: NURSE PRACTITIONER

## 2025-02-09 PROCEDURE — P9016 RBC LEUKOCYTES REDUCED: HCPCS

## 2025-02-09 PROCEDURE — P9017 PLASMA 1 DONOR FRZ W/IN 8 HR: HCPCS

## 2025-02-09 PROCEDURE — 84145 PROCALCITONIN (PCT): CPT | Performed by: INTERNAL MEDICINE

## 2025-02-09 PROCEDURE — 87338 HPYLORI STOOL AG IA: CPT | Performed by: INTERNAL MEDICINE

## 2025-02-09 PROCEDURE — 80048 BASIC METABOLIC PNL TOTAL CA: CPT | Performed by: NURSE PRACTITIONER

## 2025-02-09 PROCEDURE — 83735 ASSAY OF MAGNESIUM: CPT | Performed by: INTERNAL MEDICINE

## 2025-02-09 PROCEDURE — 82948 REAGENT STRIP/BLOOD GLUCOSE: CPT

## 2025-02-09 PROCEDURE — 85610 PROTHROMBIN TIME: CPT | Performed by: NURSE PRACTITIONER

## 2025-02-09 PROCEDURE — 82330 ASSAY OF CALCIUM: CPT | Performed by: INTERNAL MEDICINE

## 2025-02-09 PROCEDURE — 84100 ASSAY OF PHOSPHORUS: CPT | Performed by: INTERNAL MEDICINE

## 2025-02-09 PROCEDURE — 0W3P8ZZ CONTROL BLEEDING IN GASTROINTESTINAL TRACT, VIA NATURAL OR ARTIFICIAL OPENING ENDOSCOPIC: ICD-10-PCS | Performed by: INTERNAL MEDICINE

## 2025-02-09 PROCEDURE — 43255 EGD CONTROL BLEEDING ANY: CPT | Performed by: INTERNAL MEDICINE

## 2025-02-09 PROCEDURE — 83605 ASSAY OF LACTIC ACID: CPT | Performed by: NURSE PRACTITIONER

## 2025-02-09 RX ORDER — CALCIUM GLUCONATE 20 MG/ML
2 INJECTION, SOLUTION INTRAVENOUS ONCE
Status: COMPLETED | OUTPATIENT
Start: 2025-02-09 | End: 2025-02-09

## 2025-02-09 RX ORDER — PROPOFOL 10 MG/ML
INJECTION, EMULSION INTRAVENOUS AS NEEDED
Status: DISCONTINUED | OUTPATIENT
Start: 2025-02-09 | End: 2025-02-09

## 2025-02-09 RX ORDER — ALBUMIN (HUMAN) 12.5 G/50ML
50 SOLUTION INTRAVENOUS ONCE
Status: COMPLETED | OUTPATIENT
Start: 2025-02-09 | End: 2025-02-09

## 2025-02-09 RX ORDER — SUCCINYLCHOLINE/SOD CL,ISO/PF 100 MG/5ML
SYRINGE (ML) INTRAVENOUS AS NEEDED
Status: DISCONTINUED | OUTPATIENT
Start: 2025-02-09 | End: 2025-02-09

## 2025-02-09 RX ORDER — PANTOPRAZOLE SODIUM 40 MG/10ML
40 INJECTION, POWDER, LYOPHILIZED, FOR SOLUTION INTRAVENOUS EVERY 12 HOURS SCHEDULED
Status: DISCONTINUED | OUTPATIENT
Start: 2025-02-09 | End: 2025-02-10 | Stop reason: HOSPADM

## 2025-02-09 RX ORDER — MAGNESIUM SULFATE HEPTAHYDRATE 40 MG/ML
2 INJECTION, SOLUTION INTRAVENOUS ONCE
Status: COMPLETED | OUTPATIENT
Start: 2025-02-09 | End: 2025-02-09

## 2025-02-09 RX ORDER — CALCIUM CHLORIDE 100 MG/ML
INJECTION INTRAVENOUS; INTRAVENTRICULAR
Status: COMPLETED
Start: 2025-02-09 | End: 2025-02-09

## 2025-02-09 RX ORDER — CALCIUM CHLORIDE 100 MG/ML
1 INJECTION INTRAVENOUS; INTRAVENTRICULAR ONCE
Status: COMPLETED | OUTPATIENT
Start: 2025-02-09 | End: 2025-02-09

## 2025-02-09 RX ORDER — SODIUM CHLORIDE, SODIUM LACTATE, POTASSIUM CHLORIDE, CALCIUM CHLORIDE 600; 310; 30; 20 MG/100ML; MG/100ML; MG/100ML; MG/100ML
INJECTION, SOLUTION INTRAVENOUS CONTINUOUS PRN
Status: DISCONTINUED | OUTPATIENT
Start: 2025-02-09 | End: 2025-02-09

## 2025-02-09 RX ORDER — EPINEPHRINE 0.1 MG/ML
INJECTION INTRAVENOUS AS NEEDED
Status: DISCONTINUED | OUTPATIENT
Start: 2025-02-09 | End: 2025-02-09 | Stop reason: HOSPADM

## 2025-02-09 RX ORDER — SODIUM CHLORIDE, SODIUM LACTATE, POTASSIUM CHLORIDE, CALCIUM CHLORIDE 600; 310; 30; 20 MG/100ML; MG/100ML; MG/100ML; MG/100ML
125 INJECTION, SOLUTION INTRAVENOUS CONTINUOUS
Status: CANCELLED | OUTPATIENT
Start: 2025-02-09

## 2025-02-09 RX ORDER — METOCLOPRAMIDE HYDROCHLORIDE 5 MG/ML
10 INJECTION INTRAMUSCULAR; INTRAVENOUS ONCE
Status: COMPLETED | OUTPATIENT
Start: 2025-02-09 | End: 2025-02-09

## 2025-02-09 RX ORDER — LIDOCAINE HYDROCHLORIDE 20 MG/ML
1 JELLY TOPICAL ONCE
Status: DISCONTINUED | OUTPATIENT
Start: 2025-02-09 | End: 2025-02-09

## 2025-02-09 RX ADMIN — CALCIUM CHLORIDE 1 G: 100 INJECTION INTRAVENOUS; INTRAVENTRICULAR at 06:36

## 2025-02-09 RX ADMIN — PANTOPRAZOLE SODIUM 40 MG: 40 INJECTION, POWDER, FOR SOLUTION INTRAVENOUS at 21:21

## 2025-02-09 RX ADMIN — MAGNESIUM SULFATE HEPTAHYDRATE 2 G: 40 INJECTION, SOLUTION INTRAVENOUS at 09:08

## 2025-02-09 RX ADMIN — SODIUM CHLORIDE, SODIUM LACTATE, POTASSIUM CHLORIDE, AND CALCIUM CHLORIDE: .6; .31; .03; .02 INJECTION, SOLUTION INTRAVENOUS at 07:49

## 2025-02-09 RX ADMIN — PANTOPRAZOLE SODIUM 40 MG: 40 INJECTION, POWDER, FOR SOLUTION INTRAVENOUS at 09:09

## 2025-02-09 RX ADMIN — METOCLOPRAMIDE 10 MG: 5 INJECTION, SOLUTION INTRAMUSCULAR; INTRAVENOUS at 06:28

## 2025-02-09 RX ADMIN — PHENYLEPHRINE HYDROCHLORIDE 50 MCG/MIN: 10 INJECTION INTRAVENOUS at 07:51

## 2025-02-09 RX ADMIN — ALBUMIN (HUMAN) 50 G: 0.25 INJECTION, SOLUTION INTRAVENOUS at 09:38

## 2025-02-09 RX ADMIN — PROPOFOL 100 MG: 10 INJECTION, EMULSION INTRAVENOUS at 07:51

## 2025-02-09 RX ADMIN — CALCIUM GLUCONATE 2 G: 20 INJECTION, SOLUTION INTRAVENOUS at 05:32

## 2025-02-09 RX ADMIN — CHLORHEXIDINE GLUCONATE 15 ML: 1.2 SOLUTION ORAL at 09:09

## 2025-02-09 RX ADMIN — CEFTRIAXONE 1000 MG: 1 INJECTION, SOLUTION INTRAVENOUS at 14:42

## 2025-02-09 RX ADMIN — EPINEPHRINE 1 MG: 0.1 INJECTION INTRAVENOUS at 08:06

## 2025-02-09 RX ADMIN — FOLIC ACID: 5 INJECTION, SOLUTION INTRAMUSCULAR; INTRAVENOUS; SUBCUTANEOUS at 09:09

## 2025-02-09 RX ADMIN — SODIUM CHLORIDE 500 ML: 0.9 INJECTION, SOLUTION INTRAVENOUS at 03:09

## 2025-02-09 RX ADMIN — Medication 100 MG: at 07:51

## 2025-02-09 RX ADMIN — CHLORHEXIDINE GLUCONATE 15 ML: 1.2 SOLUTION ORAL at 21:21

## 2025-02-09 NOTE — PLAN OF CARE
Problem: PAIN - ADULT  Goal: Verbalizes/displays adequate comfort level or baseline comfort level  Description: Interventions:  - Encourage patient to monitor pain and request assistance  - Assess pain using appropriate pain scale  - Administer analgesics based on type and severity of pain and evaluate response  - Implement non-pharmacological measures as appropriate and evaluate response  - Consider cultural and social influences on pain and pain management  - Notify physician/advanced practitioner if interventions unsuccessful or patient reports new pain  Outcome: Progressing     Problem: INFECTION - ADULT  Goal: Absence or prevention of progression during hospitalization  Description: INTERVENTIONS:  - Assess and monitor for signs and symptoms of infection  - Monitor lab/diagnostic results  - Monitor all insertion sites, i.e. indwelling lines, tubes, and drains  - Monitor endotracheal if appropriate and nasal secretions for changes in amount and color  - [place] appropriate cooling/warming therapies per order  - Administer medications as ordered  - Instruct and encourage patient and family to use good hand hygiene technique  - Identify and instruct in appropriate isolation precautions for identified infection/condition  Outcome: Progressing  Goal: Absence of fever/infection during neutropenic period  Description: INTERVENTIONS:  - Monitor WBC    Outcome: Progressing     Problem: SAFETY ADULT  Goal: Patient will remain free of falls  Description: INTERVENTIONS:  - Educate patient/family on patient safety including physical limitations  - Instruct patient to call for assistance with activity   - Consult OT/PT to assist with strengthening/mobility   - Keep Call bell within reach  - Keep bed low and locked with side rails adjusted as appropriate  - Keep care items and personal belongings within reach  - Initiate and maintain comfort rounds  - Make Fall Risk Sign visible to staff  - Offer Toileting every 2 Hours,  in advance of need  - Initiate/Maintain bed alarm  - Obtain necessary fall risk management equipment  - Apply yellow socks and bracelet for high fall risk patients  - Consider moving patient to room near nurses station  Outcome: Progressing  Goal: Maintain or return to baseline ADL function  Description: INTERVENTIONS:  -  Assess patient's ability to carry out ADLs; assess patient's baseline for ADL function and identify physical deficits which impact ability to perform ADLs (bathing, care of mouth/teeth, toileting, grooming, dressing, etc.)  - Assess/evaluate cause of self-care deficits   - Assess range of motion  - Assess patient's mobility; develop plan if impaired  - Assess patient's need for assistive devices and provide as appropriate  - Encourage maximum independence but intervene and supervise when necessary  - Involve family in performance of ADLs  - Assess for home care needs following discharge   - Consider OT consult to assist with ADL evaluation and planning for discharge  - Provide patient education as appropriate  Outcome: Progressing  Goal: Maintains/Returns to pre admission functional level  Description: INTERVENTIONS:  - Perform AM-PAC 6 Click Basic Mobility/ Daily Activity assessment daily.  - Set and communicate daily mobility goal to care team and patient/family/caregiver.   - Collaborate with rehabilitation services on mobility goals if consulted  - Perform Range of Motion 3 times a day.  - Reposition patient every 2 hours.  - Dangle patient 3 times a day  - Stand patient 3 times a day  - Ambulate patient 3 times a day  - Out of bed to chair 3 times a day   - Out of bed for meals 3 times a day  - Out of bed for toileting  - Record patient progress and toleration of activity level   Outcome: Progressing     Problem: DISCHARGE PLANNING  Goal: Discharge to home or other facility with appropriate resources  Description: INTERVENTIONS:  - Identify barriers to discharge w/patient and caregiver  -  Arrange for needed discharge resources and transportation as appropriate  - Identify discharge learning needs (meds, wound care, etc.)  - Arrange for interpretive services to assist at discharge as needed  - Refer to Case Management Department for coordinating discharge planning if the patient needs post-hospital services based on physician/advanced practitioner order or complex needs related to functional status, cognitive ability, or social support system  Outcome: Progressing     Problem: Knowledge Deficit  Goal: Patient/family/caregiver demonstrates understanding of disease process, treatment plan, medications, and discharge instructions  Description: Complete learning assessment and assess knowledge base.  Interventions:  - Provide teaching at level of understanding  - Provide teaching via preferred learning methods  Outcome: Progressing     Problem: CARDIOVASCULAR - ADULT  Goal: Maintains optimal cardiac output and hemodynamic stability  Description: INTERVENTIONS:  - Monitor I/O, vital signs and rhythm  - Monitor for S/S and trends of decreased cardiac output  - Administer and titrate ordered vasoactive medications to optimize hemodynamic stability  - Assess quality of pulses, skin color and temperature  - Assess for signs of decreased coronary artery perfusion  - Instruct patient to report change in severity of symptoms  Outcome: Progressing  Goal: Absence of cardiac dysrhythmias or at baseline rhythm  Description: INTERVENTIONS:  - Continuous cardiac monitoring, vital signs, obtain 12 lead EKG if ordered  - Administer antiarrhythmic and heart rate control medications as ordered  - Monitor electrolytes and administer replacement therapy as ordered  Outcome: Progressing     Problem: GASTROINTESTINAL - ADULT  Goal: Minimal or absence of nausea and/or vomiting  Description: INTERVENTIONS:  - Administer IV fluids if ordered to ensure adequate hydration  - Maintain NPO status until nausea and vomiting are  resolved  - Nasogastric tube if ordered  - Administer ordered antiemetic medications as needed  - Provide nonpharmacologic comfort measures as appropriate  - Advance diet as tolerated, if ordered  - Consider nutrition services referral to assist patient with adequate nutrition and appropriate food choices  Outcome: Progressing  Goal: Maintains or returns to baseline bowel function  Description: INTERVENTIONS:  - Assess bowel function  - Encourage oral fluids to ensure adequate hydration  - Administer IV fluids if ordered to ensure adequate hydration  - Administer ordered medications as needed  - Encourage mobilization and activity  - Consider nutritional services referral to assist patient with adequate nutrition and appropriate food choices  Outcome: Progressing  Goal: Maintains adequate nutritional intake  Description: INTERVENTIONS:  - Monitor percentage of each meal consumed  - Identify factors contributing to decreased intake, treat as appropriate  - Assist with meals as needed  - Monitor I&O, weight, and lab values if indicated  - Obtain nutrition services referral as needed  Outcome: Progressing  Goal: Establish and maintain optimal ostomy function  Description: INTERVENTIONS:  - Assess bowel function  - Encourage oral fluids to ensure adequate hydration  - Administer IV fluids if ordered to ensure adequate hydration   - Administer ordered medications as needed  - Encourage mobilization and activity  - Nutrition services referral to assist patient with appropriate food choices  - Assess stoma site  - Consider wound care consult   Outcome: Progressing  Goal: Oral mucous membranes remain intact  Description: INTERVENTIONS  - Assess oral mucosa and hygiene practices  - Implement preventative oral hygiene regimen  - Implement oral medicated treatments as ordered  - Initiate Nutrition services referral as needed  Outcome: Progressing     Problem: METABOLIC, FLUID AND ELECTROLYTES - ADULT  Goal: Electrolytes  maintained within normal limits  Description: INTERVENTIONS:  - Monitor labs and assess patient for signs and symptoms of electrolyte imbalances  - Administer electrolyte replacement as ordered  - Monitor response to electrolyte replacements, including repeat lab results as appropriate  - Instruct patient on fluid and nutrition as appropriate  Outcome: Progressing  Goal: Fluid balance maintained  Description: INTERVENTIONS:  - Monitor labs   - Monitor I/O and WT  - Instruct patient on fluid and nutrition as appropriate  - Assess for signs & symptoms of volume excess or deficit  Outcome: Progressing  Goal: Glucose maintained within target range  Description: INTERVENTIONS:  - Monitor Blood Glucose as ordered  - Assess for signs and symptoms of hyperglycemia and hypoglycemia  - Administer ordered medications to maintain glucose within target range  - Assess nutritional intake and initiate nutrition service referral as needed  Outcome: Progressing     Problem: SKIN/TISSUE INTEGRITY - ADULT  Goal: Skin Integrity remains intact(Skin Breakdown Prevention)  Description: Assess:  -Perform Bryan assessment every shift  -Clean and moisturize skin   -Inspect skin when repositioning, toileting, and assisting with ADLS  -Assess under medical devices   -Assess extremities for adequate circulation and sensation     Bed Management:  -Have minimal linens on bed & keep smooth, unwrinkled  -Change linens as needed when moist or perspiring  -Avoid sitting or lying in one position for more than 2 hours while in bed  -Keep HOB at 30 degrees     Toileting:  -Offer bedside commode  -Assess for incontinence   -Use incontinent care products after each incontinent episode    Activity:  -Mobilize patient 3 times a day  -Encourage activity and walks on unit  -Encourage or provide ROM exercises   -Turn and reposition patient every 2 Hours  -Use appropriate equipment to lift or move patient in bed  -Instruct/ Assist with weight shifting when out  of bed in chair  -Consider limitation of chair time 2 hour intervals    Skin Care:  -Avoid use of baby powder, tape, friction and shearing, hot water or constrictive clothing  -Relieve pressure over bony prominences using mepelex  -Do not massage red bony areas    Next Steps:  -Teach patient strategies to minimize risks   -Consider consults to  interdisciplinary teams   Outcome: Progressing     Problem: HEMATOLOGIC - ADULT  Goal: Maintains hematologic stability  Description: INTERVENTIONS  - Assess for signs and symptoms of bleeding or hemorrhage  - Monitor labs  - Administer supportive blood products/factors as ordered and appropriate  Outcome: Progressing     Problem: MUSCULOSKELETAL - ADULT  Goal: Maintain or return mobility to safest level of function  Description: INTERVENTIONS:  - Assess patient's ability to carry out ADLs; assess patient's baseline for ADL function and identify physical deficits which impact ability to perform ADLs (bathing, care of mouth/teeth, toileting, grooming, dressing, etc.)  - Assess/evaluate cause of self-care deficits   - Assess range of motion  - Assess patient's mobility  - Assess patient's need for assistive devices and provide as appropriate  - Encourage maximum independence but intervene and supervise when necessary  - Involve family in performance of ADLs  - Assess for home care needs following discharge   - Consider OT consult to assist with ADL evaluation and planning for discharge  - Provide patient education as appropriate  Outcome: Progressing  Goal: Maintain proper alignment of affected body part  Description: INTERVENTIONS:  - Support, maintain and protect limb and body alignment  - Provide patient/ family with appropriate education  Outcome: Progressing     Problem: Nutrition/Hydration-ADULT  Goal: Nutrient/Hydration intake appropriate for improving, restoring or maintaining nutritional needs  Description: Monitor and assess patient's nutrition/hydration status for  malnutrition. Collaborate with interdisciplinary team and initiate plan and interventions as ordered.  Monitor patient's weight and dietary intake as ordered or per policy. Utilize nutrition screening tool and intervene as necessary. Determine patient's food preferences and provide high-protein, high-caloric foods as appropriate.     INTERVENTIONS:  - Monitor oral intake, urinary output, labs, and treatment plans  - Assess nutrition and hydration status and recommend course of action  - Evaluate amount of meals eaten  - Assist patient with eating if necessary   - Allow adequate time for meals  - Recommend/ encourage appropriate diets, oral nutritional supplements, and vitamin/mineral supplements  - Order, calculate, and assess calorie counts as needed  - Recommend, monitor, and adjust tube feedings and TPN/PPN based on assessed needs  - Assess need for intravenous fluids  - Provide specific nutrition/hydration education as appropriate  - Include patient/family/caregiver in decisions related to nutrition  Outcome: Progressing     Problem: Prexisting or High Potential for Compromised Skin Integrity  Goal: Skin integrity is maintained or improved  Description: INTERVENTIONS:  - Identify patients at risk for skin breakdown  - Assess and monitor skin integrity  - Assess and monitor nutrition and hydration status  - Monitor labs   - Assess for incontinence   - Turn and reposition patient  - Assist with mobility/ambulation  - Relieve pressure over bony prominences  - Avoid friction and shearing  - Provide appropriate hygiene as needed including keeping skin clean and dry  - Evaluate need for skin moisturizer/barrier cream  - Collaborate with interdisciplinary team   - Patient/family teaching  - Consider wound care consult   Outcome: Progressing

## 2025-02-09 NOTE — ASSESSMENT & PLAN NOTE
Pt states he had one episode of bright red blood tinged sputum at home as well as 6 episodes of dark-colored diarrhea that occurred this am.   Pt with a positive occult blood in the ED  (2/8) Pt underwent EGD. Esophagus was clear; however stomach full of blood and clots. Unable to get clear view of mucosal lining. No active bleed noted. Plan for possible re-scope Monday unless further decompensation then will scope 2/9.    Plan:  Received 3 units PRBC 2/7 and 2 units PRBC 2/8  Continue Protonix gtt  S/p EGD, NGT was placed and 600 ml of blood was removed. NGT removed 2/2 pt discomfort  Trend Hgb and transfuse for Hgb <7.0  Monitor for any active signs of bleeding  GI consulted and following
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
## 2025-02-09 NOTE — ASSESSMENT & PLAN NOTE
He has acute blood loss anemia and melena and his upper endoscopy yesterday revealed blood clot in his stomach and duodenum but no active bleeding.  The bleeding is likely due to peptic ulcer disease or a Dieulafoy lesion.  Since he had evidence of recurrent bleeding overnight we will keep him n.p.o. and plan for an urgent upper endoscopy this morning.

## 2025-02-09 NOTE — PLAN OF CARE
Problem: PAIN - ADULT  Goal: Verbalizes/displays adequate comfort level or baseline comfort level  Description: Interventions:  - Encourage patient to monitor pain and request assistance  - Assess pain using appropriate pain scale  - Administer analgesics based on type and severity of pain and evaluate response  - Implement non-pharmacological measures as appropriate and evaluate response  - Consider cultural and social influences on pain and pain management  - Notify physician/advanced practitioner if interventions unsuccessful or patient reports new pain  Outcome: Progressing     Problem: INFECTION - ADULT  Goal: Absence or prevention of progression during hospitalization  Description: INTERVENTIONS:  - Assess and monitor for signs and symptoms of infection  - Monitor lab/diagnostic results  - Monitor all insertion sites, i.e. indwelling lines, tubes, and drains  - Monitor endotracheal if appropriate and nasal secretions for changes in amount and color  - [place] appropriate cooling/warming therapies per order  - Administer medications as ordered  - Instruct and encourage patient and family to use good hand hygiene technique  - Identify and instruct in appropriate isolation precautions for identified infection/condition  Outcome: Progressing  Goal: Absence of fever/infection during neutropenic period  Description: INTERVENTIONS:  - Monitor WBC    Outcome: Progressing     Problem: SAFETY ADULT  Goal: Patient will remain free of falls  Description: INTERVENTIONS:  - Educate patient/family on patient safety including physical limitations  - Instruct patient to call for assistance with activity   - Consult OT/PT to assist with strengthening/mobility   - Keep Call bell within reach  - Keep bed low and locked with side rails adjusted as appropriate  - Keep care items and personal belongings within reach  - Initiate and maintain comfort rounds  - Make Fall Risk Sign visible to staff  - Offer Toileting every 2 Hours,  in advance of need  - Initiate/Maintain bed alarm  - Obtain necessary fall risk management equipment  - Apply yellow socks and bracelet for high fall risk patients  - Consider moving patient to room near nurses station  Outcome: Progressing  Goal: Maintain or return to baseline ADL function  Description: INTERVENTIONS:  -  Assess patient's ability to carry out ADLs; assess patient's baseline for ADL function and identify physical deficits which impact ability to perform ADLs (bathing, care of mouth/teeth, toileting, grooming, dressing, etc.)  - Assess/evaluate cause of self-care deficits   - Assess range of motion  - Assess patient's mobility; develop plan if impaired  - Assess patient's need for assistive devices and provide as appropriate  - Encourage maximum independence but intervene and supervise when necessary  - Involve family in performance of ADLs  - Assess for home care needs following discharge   - Consider OT consult to assist with ADL evaluation and planning for discharge  - Provide patient education as appropriate  Outcome: Progressing  Goal: Maintains/Returns to pre admission functional level  Description: INTERVENTIONS:  - Perform AM-PAC 6 Click Basic Mobility/ Daily Activity assessment daily.  - Set and communicate daily mobility goal to care team and patient/family/caregiver.   - Collaborate with rehabilitation services on mobility goals if consulted  - Perform Range of Motion 3 times a day.  - Reposition patient every 2 hours.  - Dangle patient 3 times a day  - Stand patient 3 times a day  - Ambulate patient 3 times a day  - Out of bed to chair 3 times a day   - Out of bed for meals 3 times a day  - Out of bed for toileting  - Record patient progress and toleration of activity level   Outcome: Progressing     Problem: DISCHARGE PLANNING  Goal: Discharge to home or other facility with appropriate resources  Description: INTERVENTIONS:  - Identify barriers to discharge w/patient and caregiver  -  Arrange for needed discharge resources and transportation as appropriate  - Identify discharge learning needs (meds, wound care, etc.)  - Arrange for interpretive services to assist at discharge as needed  - Refer to Case Management Department for coordinating discharge planning if the patient needs post-hospital services based on physician/advanced practitioner order or complex needs related to functional status, cognitive ability, or social support system  Outcome: Progressing
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
## 2025-02-09 NOTE — NURSING NOTE
Bedside report taken from JOSÉ MIGUEL [place] RN.    [id number]: 2 u FFP infused via rapid infuser    0726- Pt off unit taken to OR for EGD    0900- pt transferred back to ICU, CRNP at bedside    0938- albumin infusing at this time: See MAR

## 2025-02-09 NOTE — ASSESSMENT & PLAN NOTE
Pt with increase in ETOH intake over the past few weeks. Unclear what type of alcohol or how much.     Plan:  IV Thiamine and Folic Acid  CIWA protocol

## 2025-02-09 NOTE — ANESTHESIA PREPROCEDURE EVALUATION
Procedure:  EGD    Relevant Problems   CARDIO   (+) Primary hypertension      HEMATOLOGY   (+) Acute blood loss anemia        Physical Exam    Airway    Mallampati score: II  TM Distance: >3 FB  Neck ROM: full     Dental       Cardiovascular      Pulmonary      Other Findings        Anesthesia Plan  ASA Score- 4 Emergent    Anesthesia Type- general with ASA Monitors.         Additional Monitors:     Airway Plan: ETT.    Comment: RSI.       Plan Factors-Exercise tolerance (METS): <4 METS.    Chart reviewed.                      Induction- intravenous.    Postoperative Plan- Plan for postoperative opioid use. Planned trial extubation    Perioperative Resuscitation Plan - Level 1 - Full Code.       Informed Consent- Anesthetic plan and risks discussed with patient.  I personally reviewed this patient with the CRNA. Discussed and agreed on the Anesthesia Plan with the CRNA..      NPO Status:  Vitals Value Taken Time   Date of last liquid 01/31/25 02/09/25 0746   Time of last liquid 2200 02/08/25 1111   Date of last solid 01/31/25 02/09/25 0746   Time of last solid 0900 02/08/25 1111       Anesthesia plan and consent discussed with Christian who expressed understanding and agreement. Risks/benefits and alternatives discussed with patient including possible PONV, sore throat, damage to teeth/lips/gums and possibility of rare anesthetic and surgical emergencies.
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
## 2025-02-09 NOTE — PROGRESS NOTES
Progress Note - Gastroenterology   Name: Christian Lzi 64 y.o. male I MRN: [id number]  Unit/Bed#: ICU 10-01 I Date of Admission: 2/7/2025   Date of Service: 2/9/2025 I Hospital Day: 2    Assessment & Plan  Acute blood loss anemia  He has acute blood loss anemia and melena and his upper endoscopy yesterday revealed blood clot in his stomach and duodenum but no active bleeding.  The bleeding is likely due to peptic ulcer disease or a Dieulafoy lesion.  Since he had evidence of recurrent bleeding overnight we will keep him n.p.o. and plan for an urgent upper endoscopy this morning.  Melena  See above  Colon cancer screening  He has never previously had a colonoscopy so this should be scheduled as an outpatient for routine colon cancer screening.        Subjective   He had additional episodes of melena last night but no hematemesis or hematochezia.  He had tachycardia but did not require any pressors.  He was given 2 units of packed red blood cells and 2 units of fresh frozen plasma.  He denies abdominal pain but feels fatigued.    Objective :  Temp:  [95.7 °F (35.4 °C)-99.8 °F (37.7 °C)] 96.8 °F (36 °C)  HR:  [] 94  BP: ()/(40-67) 96/59  Resp:  [12-41] 20  SpO2:  [68 %-100 %] 100 %  O2 Device: Nasal cannula  Nasal Cannula O2 Flow Rate (L/min):  [2 L/min] 2 L/min    Physical Exam  Constitutional:       Appearance: He is well-developed. He is ill-appearing.   HENT:      Head: Normocephalic and atraumatic.      Comments: Nasogastric tube in place  Eyes:      General: No scleral icterus.     Conjunctiva/sclera: Conjunctivae normal.   Cardiovascular:      Rate and Rhythm: Normal rate and regular rhythm.      Heart sounds: Normal heart sounds.   Pulmonary:      Effort: Pulmonary effort is normal.      Breath sounds: Normal breath sounds. No wheezing.   Abdominal:      General: Bowel sounds are normal. There is no distension.      Palpations: Abdomen is soft. There is no mass.      Tenderness: There is no  abdominal tenderness. There is no guarding or rebound.   Musculoskeletal:         General: Normal range of motion.      Cervical back: Normal range of motion and neck supple.   Lymphadenopathy:      Cervical: No cervical adenopathy.   Skin:     General: Skin is warm and dry.      Coloration: Skin is pale.      Findings: No rash.   Neurological:      Mental Status: He is alert and oriented to person, place, and time.           Lab Results: I have reviewed the following results:
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
## 2025-02-09 NOTE — NURSING NOTE
Patient noted to have significant hypotension, diaphoresis and tachycardia. Tone JEFFERSON notified of change in condition.  1 unit PRBCs ordered initially and administered via pressure bag. Patient also received calcium chloride. Patient began vomiting nickolas red blood.   CRNP ordered  2 further units of PRBCs and FFP to be given via rapid transfuser.     2 units PRBC's given via rapid transfusion.   BP's improving.  16 Fr NG tube inserted for gastric lavage, attached to LIS after placement confirmed via auscultation. Nickolas red blood returned.     Bedside report given to OBINNA Mauricio.

## 2025-02-09 NOTE — ANESTHESIA POSTPROCEDURE EVALUATION
Post-Op Assessment Note    CV Status:  Stable  Pain Score: 0    Pain management: adequate    Multimodal analgesia used between 6 hours prior to anesthesia start to PACU discharge    Mental Status:  Alert   Hydration Status:  Stable   PONV Controlled:  None   Airway Patency:  Patent  Two or more mitigation strategies used for obstructive sleep apnea   Post Op Vitals Reviewed: Yes    No anethesia notable event occurred.    Staff: Anesthesiologist           Last Filed PACU Vitals:  Vitals Value Taken Time   Temp     Pulse 118 02/09/25 0841   /58 02/09/25 0841   Resp 23 02/09/25 0841   SpO2 99 % 02/09/25 0841   Vitals shown include unfiled device data.

## 2025-02-09 NOTE — NURSING NOTE
Patient with persistently low BP's, CRNP aware. Allowed patient time to recover after getting out of bed to commode, also administered a 500ml fluid bolus of NS. Patient remains hypotensive, hemoglobin checked early and resulted at 5.5. CRNP notified of critical lab value, order received to administer 2 units PRBC's. Patient lethargic but otherwise not in any acute distress.

## 2025-02-10 ENCOUNTER — ANESTHESIA EVENT (INPATIENT)
Dept: GASTROENTEROLOGY | Facility: HOSPITAL | Age: 65
DRG: 377 | End: 2025-02-10
Payer: COMMERCIAL

## 2025-02-10 ENCOUNTER — APPOINTMENT (INPATIENT)
Dept: RADIOLOGY | Facility: HOSPITAL | Age: 65
DRG: 377 | End: 2025-02-10
Payer: COMMERCIAL

## 2025-02-10 ENCOUNTER — APPOINTMENT (INPATIENT)
Dept: RADIOLOGY | Facility: HOSPITAL | Age: 65
DRG: 853 | End: 2025-02-10
Attending: RADIOLOGY
Payer: COMMERCIAL

## 2025-02-10 ENCOUNTER — HOSPITAL ENCOUNTER (INPATIENT)
Facility: HOSPITAL | Age: 65
LOS: 6 days | Discharge: HOME | DRG: 853 | End: 2025-02-16
Attending: INTERNAL MEDICINE | Admitting: INTERNAL MEDICINE
Payer: COMMERCIAL

## 2025-02-10 ENCOUNTER — ANESTHESIA EVENT (INPATIENT)
Dept: RADIOLOGY | Facility: HOSPITAL | Age: 65
End: 2025-02-10
Payer: COMMERCIAL

## 2025-02-10 ENCOUNTER — APPOINTMENT (INPATIENT)
Dept: GASTROENTEROLOGY | Facility: HOSPITAL | Age: 65
DRG: 377 | End: 2025-02-10
Payer: COMMERCIAL

## 2025-02-10 ENCOUNTER — ANESTHESIA (INPATIENT)
Dept: RADIOLOGY | Facility: HOSPITAL | Age: 65
End: 2025-02-10
Payer: COMMERCIAL

## 2025-02-10 ENCOUNTER — ANESTHESIA (INPATIENT)
Dept: GASTROENTEROLOGY | Facility: HOSPITAL | Age: 65
DRG: 377 | End: 2025-02-10
Payer: COMMERCIAL

## 2025-02-10 ENCOUNTER — APPOINTMENT (INPATIENT)
Dept: RADIOLOGY | Facility: HOSPITAL | Age: 65
DRG: 853 | End: 2025-02-10
Payer: COMMERCIAL

## 2025-02-10 ENCOUNTER — APPOINTMENT (INPATIENT)
Dept: GASTROENTEROLOGY | Facility: HOSPITAL | Age: 65
DRG: 853 | End: 2025-02-10
Payer: COMMERCIAL

## 2025-02-10 ENCOUNTER — APPOINTMENT (INPATIENT)
Dept: CT IMAGING | Facility: HOSPITAL | Age: 65
DRG: 377 | End: 2025-02-10
Payer: COMMERCIAL

## 2025-02-10 VITALS
RESPIRATION RATE: 21 BRPM | SYSTOLIC BLOOD PRESSURE: 99 MMHG | TEMPERATURE: 97 F | OXYGEN SATURATION: 99 % | WEIGHT: 134.48 LBS | HEIGHT: 69 IN | HEART RATE: 85 BPM | DIASTOLIC BLOOD PRESSURE: 52 MMHG | BODY MASS INDEX: 19.92 KG/M2

## 2025-02-10 DIAGNOSIS — J90 PLEURAL EFFUSION: ICD-10-CM

## 2025-02-10 DIAGNOSIS — R04.2 HEMOPTYSIS: ICD-10-CM

## 2025-02-10 DIAGNOSIS — S22.23XA: ICD-10-CM

## 2025-02-10 DIAGNOSIS — J96.01 ACUTE HYPOXIC RESPIRATORY FAILURE (HCC): ICD-10-CM

## 2025-02-10 DIAGNOSIS — K26.4 BLEEDING DUODENAL ULCER: ICD-10-CM

## 2025-02-10 DIAGNOSIS — D62 ACUTE BLOOD LOSS ANEMIA: Primary | ICD-10-CM

## 2025-02-10 DIAGNOSIS — K92.1 MELENA: ICD-10-CM

## 2025-02-10 LAB
ABO GROUP BLD BPU: NORMAL
ABO GROUP BLD: NORMAL
ABO GROUP BLD: NORMAL
ALBUMIN SERPL BCG-MCNC: 1.8 G/DL (ref 3.5–5)
ALBUMIN SERPL BCG-MCNC: 2.2 G/DL (ref 3.5–5)
ALBUMIN SERPL BCG-MCNC: 2.6 G/DL (ref 3.5–5)
ALBUMIN SERPL BCG-MCNC: 3 G/DL (ref 3.5–5)
ALP SERPL-CCNC: 26 U/L (ref 34–104)
ALP SERPL-CCNC: 30 U/L (ref 34–104)
ALP SERPL-CCNC: 32 U/L (ref 34–104)
ALP SERPL-CCNC: 48 U/L (ref 34–104)
ALT SERPL W P-5'-P-CCNC: 10 U/L (ref 7–52)
ALT SERPL W P-5'-P-CCNC: 12 U/L (ref 7–52)
ALT SERPL W P-5'-P-CCNC: 14 U/L (ref 7–52)
ALT SERPL W P-5'-P-CCNC: 9 U/L (ref 7–52)
ANION GAP SERPL CALCULATED.3IONS-SCNC: 2 MMOL/L (ref 4–13)
ANION GAP SERPL CALCULATED.3IONS-SCNC: 4 MMOL/L (ref 4–13)
ANION GAP SERPL CALCULATED.3IONS-SCNC: 4 MMOL/L (ref 4–13)
ANION GAP SERPL CALCULATED.3IONS-SCNC: 6 MMOL/L (ref 4–13)
ANISOCYTOSIS BLD QL SMEAR: PRESENT
AST SERPL W P-5'-P-CCNC: 10 U/L (ref 13–39)
AST SERPL W P-5'-P-CCNC: 11 U/L (ref 13–39)
AST SERPL W P-5'-P-CCNC: 16 U/L (ref 13–39)
AST SERPL W P-5'-P-CCNC: 16 U/L (ref 13–39)
BASE EXCESS BLDA CALC-SCNC: -13 MMOL/L (ref -2–3)
BASE EXCESS BLDA CALC-SCNC: -2 MMOL/L (ref -2–3)
BASE EXCESS BLDA CALC-SCNC: -7 MMOL/L (ref -2–3)
BASE EXCESS BLDA CALC-SCNC: 0 MMOL/L (ref -2–3)
BASE EXCESS BLDA CALC-SCNC: 0.5 MMOL/L
BASE EXCESS BLDA CALC-SCNC: 1 MMOL/L (ref -2–3)
BASOPHILS # BLD AUTO: 0.02 THOUSANDS/ΜL (ref 0–0.1)
BASOPHILS NFR BLD AUTO: 0 % (ref 0–1)
BILIRUB SERPL-MCNC: 0.61 MG/DL (ref 0.2–1)
BILIRUB SERPL-MCNC: 0.78 MG/DL (ref 0.2–1)
BILIRUB SERPL-MCNC: 1.02 MG/DL (ref 0.2–1)
BILIRUB SERPL-MCNC: 4.27 MG/DL (ref 0.2–1)
BLD GP AB SCN SERPL QL: NEGATIVE
BLD GP AB SCN SERPL QL: NEGATIVE
BPU ID: NORMAL
BUN SERPL-MCNC: 21 MG/DL (ref 5–25)
BUN SERPL-MCNC: 23 MG/DL (ref 5–25)
BUN SERPL-MCNC: 23 MG/DL (ref 5–25)
BUN SERPL-MCNC: 24 MG/DL (ref 5–25)
CA-I BLD-SCNC: 0.78 MMOL/L (ref 1.12–1.32)
CA-I BLD-SCNC: 0.89 MMOL/L (ref 1.12–1.32)
CA-I BLD-SCNC: 1.05 MMOL/L (ref 1.12–1.32)
CA-I BLD-SCNC: 1.1 MMOL/L (ref 1.12–1.32)
CA-I BLD-SCNC: 1.22 MMOL/L (ref 1.12–1.32)
CA-I BLD-SCNC: 1.22 MMOL/L (ref 1.12–1.32)
CA-I BLD-SCNC: 1.41 MMOL/L (ref 1.12–1.32)
CA-I BLD-SCNC: 1.45 MMOL/L (ref 1.12–1.32)
CALCIUM ALBUM COR SERPL-MCNC: 10.5 MG/DL (ref 8.3–10.1)
CALCIUM ALBUM COR SERPL-MCNC: 8.3 MG/DL (ref 8.3–10.1)
CALCIUM ALBUM COR SERPL-MCNC: 8.8 MG/DL (ref 8.3–10.1)
CALCIUM ALBUM COR SERPL-MCNC: 8.8 MG/DL (ref 8.3–10.1)
CALCIUM SERPL-MCNC: 6.9 MG/DL (ref 8.4–10.2)
CALCIUM SERPL-MCNC: 7 MG/DL (ref 8.4–10.2)
CALCIUM SERPL-MCNC: 7.7 MG/DL (ref 8.4–10.2)
CALCIUM SERPL-MCNC: 9.7 MG/DL (ref 8.4–10.2)
CFFMA (FUNCTIONAL FIBRINOGEN MAX AMPLITUDE): 14 MM (ref 15–32)
CFFMA (FUNCTIONAL FIBRINOGEN MAX AMPLITUDE): 19.3 MM (ref 15–32)
CHLORIDE SERPL-SCNC: 107 MMOL/L (ref 96–108)
CHLORIDE SERPL-SCNC: 108 MMOL/L (ref 96–108)
CHLORIDE SERPL-SCNC: 108 MMOL/L (ref 96–108)
CHLORIDE SERPL-SCNC: 111 MMOL/L (ref 96–108)
CKLY30: 0 % (ref 0–2.6)
CKLY30: 0 % (ref 0–2.6)
CKR(REACTION TIME): 6.1 MIN (ref 4.6–9.1)
CKR(REACTION TIME): 7.6 MIN (ref 4.6–9.1)
CO2 SERPL-SCNC: 23 MMOL/L (ref 21–32)
CO2 SERPL-SCNC: 25 MMOL/L (ref 21–32)
CO2 SERPL-SCNC: 27 MMOL/L (ref 21–32)
CO2 SERPL-SCNC: 27 MMOL/L (ref 21–32)
CREAT SERPL-MCNC: 0.4 MG/DL (ref 0.6–1.3)
CREAT SERPL-MCNC: 0.46 MG/DL (ref 0.6–1.3)
CREAT SERPL-MCNC: 0.46 MG/DL (ref 0.6–1.3)
CREAT SERPL-MCNC: 0.47 MG/DL (ref 0.6–1.3)
CROSSMATCH: NORMAL
CRTMA(RAPIDTEG MAX AMPLITUDE): 45.1 MM (ref 52–70)
CRTMA(RAPIDTEG MAX AMPLITUDE): 54.4 MM (ref 52–70)
EOSINOPHIL # BLD AUTO: 0 THOUSAND/ΜL (ref 0–0.61)
EOSINOPHIL # BLD AUTO: 0.01 THOUSAND/ΜL (ref 0–0.61)
EOSINOPHIL # BLD AUTO: 0.01 THOUSAND/ΜL (ref 0–0.61)
EOSINOPHIL NFR BLD AUTO: 0 % (ref 0–6)
ERYTHROCYTE [DISTWIDTH] IN BLOOD BY AUTOMATED COUNT: 14.1 % (ref 11.6–15.1)
ERYTHROCYTE [DISTWIDTH] IN BLOOD BY AUTOMATED COUNT: 14.6 % (ref 11.6–15.1)
ERYTHROCYTE [DISTWIDTH] IN BLOOD BY AUTOMATED COUNT: 14.8 % (ref 11.6–15.1)
ERYTHROCYTE [DISTWIDTH] IN BLOOD BY AUTOMATED COUNT: 15 % (ref 11.6–15.1)
FIBRINOGEN PPP-MCNC: 285 MG/DL (ref 206–523)
GFR SERPL CREATININE-BSD FRML MDRD: 117 ML/MIN/1.73SQ M
GFR SERPL CREATININE-BSD FRML MDRD: 118 ML/MIN/1.73SQ M
GFR SERPL CREATININE-BSD FRML MDRD: 118 ML/MIN/1.73SQ M
GFR SERPL CREATININE-BSD FRML MDRD: 125 ML/MIN/1.73SQ M
GLUCOSE SERPL-MCNC: 103 MG/DL (ref 65–140)
GLUCOSE SERPL-MCNC: 109 MG/DL (ref 65–140)
GLUCOSE SERPL-MCNC: 118 MG/DL (ref 65–140)
GLUCOSE SERPL-MCNC: 141 MG/DL (ref 65–140)
GLUCOSE SERPL-MCNC: 144 MG/DL (ref 65–140)
GLUCOSE SERPL-MCNC: 153 MG/DL (ref 65–140)
GLUCOSE SERPL-MCNC: 156 MG/DL (ref 65–140)
GLUCOSE SERPL-MCNC: 168 MG/DL (ref 65–140)
GLUCOSE SERPL-MCNC: 181 MG/DL (ref 65–140)
GLUCOSE SERPL-MCNC: 200 MG/DL (ref 65–140)
H PYLORI AG STL QL IA: NEGATIVE
HCO3 BLDA-SCNC: 13.1 MMOL/L (ref 22–28)
HCO3 BLDA-SCNC: 21 MMOL/L (ref 22–28)
HCO3 BLDA-SCNC: 23 MMOL/L (ref 22–28)
HCO3 BLDA-SCNC: 24.9 MMOL/L (ref 22–28)
HCO3 BLDA-SCNC: 27.1 MMOL/L (ref 24–30)
HCO3 BLDA-SCNC: 27.3 MMOL/L (ref 22–28)
HCT VFR BLD AUTO: 16.9 % (ref 36.5–49.3)
HCT VFR BLD AUTO: 19.7 % (ref 36.5–49.3)
HCT VFR BLD AUTO: 21.9 % (ref 36.5–49.3)
HCT VFR BLD AUTO: 33.4 % (ref 36.5–49.3)
HCT VFR BLD CALC: 25 % (ref 36.5–49.3)
HCT VFR BLD CALC: 28 % (ref 36.5–49.3)
HCT VFR BLD CALC: 30 % (ref 36.5–49.3)
HCT VFR BLD CALC: 32 % (ref 36.5–49.3)
HCT VFR BLD CALC: <15 % (ref 36.5–49.3)
HGB BLD-MCNC: 11.8 G/DL (ref 12–17)
HGB BLD-MCNC: 5.8 G/DL (ref 12–17)
HGB BLD-MCNC: 5.9 G/DL (ref 12–17)
HGB BLD-MCNC: 6.7 G/DL (ref 12–17)
HGB BLD-MCNC: 7.6 G/DL (ref 12–17)
HGB BLDA-MCNC: 10.2 G/DL (ref 12–17)
HGB BLDA-MCNC: 10.9 G/DL (ref 12–17)
HGB BLDA-MCNC: 8.5 G/DL (ref 12–17)
HGB BLDA-MCNC: 9.5 G/DL (ref 12–17)
IMM GRANULOCYTES # BLD AUTO: 0.17 THOUSAND/UL (ref 0–0.2)
IMM GRANULOCYTES # BLD AUTO: 0.19 THOUSAND/UL (ref 0–0.2)
IMM GRANULOCYTES # BLD AUTO: 0.21 THOUSAND/UL (ref 0–0.2)
IMM GRANULOCYTES NFR BLD AUTO: 1 % (ref 0–2)
IMM GRANULOCYTES NFR BLD AUTO: 2 % (ref 0–2)
IMM GRANULOCYTES NFR BLD AUTO: 2 % (ref 0–2)
INR PPP: 1.32 (ref 0.85–1.19)
INR PPP: 1.33 (ref 0.85–1.19)
INR PPP: 1.63 (ref 0.85–1.19)
LACTATE SERPL-SCNC: 0.7 MMOL/L (ref 0.5–2)
LACTATE SERPL-SCNC: 1.1 MMOL/L (ref 0.5–2)
LYMPHOCYTES # BLD AUTO: 0.49 THOUSANDS/ΜL (ref 0.6–4.47)
LYMPHOCYTES # BLD AUTO: 0.8 THOUSANDS/ΜL (ref 0.6–4.47)
LYMPHOCYTES # BLD AUTO: 1.11 THOUSANDS/ΜL (ref 0.6–4.47)
LYMPHOCYTES NFR BLD AUTO: 5 % (ref 14–44)
LYMPHOCYTES NFR BLD AUTO: 5 % (ref 14–44)
LYMPHOCYTES NFR BLD AUTO: 9 % (ref 14–44)
MACROCYTES BLD QL AUTO: PRESENT
MAGNESIUM SERPL-MCNC: 1.8 MG/DL (ref 1.9–2.7)
MAGNESIUM SERPL-MCNC: 1.8 MG/DL (ref 1.9–2.7)
MCH RBC QN AUTO: 30 PG (ref 26.8–34.3)
MCH RBC QN AUTO: 30.2 PG (ref 26.8–34.3)
MCH RBC QN AUTO: 31.3 PG (ref 26.8–34.3)
MCH RBC QN AUTO: 31.6 PG (ref 26.8–34.3)
MCHC RBC AUTO-ENTMCNC: 34 G/DL (ref 31.4–37.4)
MCHC RBC AUTO-ENTMCNC: 34.7 G/DL (ref 31.4–37.4)
MCHC RBC AUTO-ENTMCNC: 34.9 G/DL (ref 31.4–37.4)
MCHC RBC AUTO-ENTMCNC: 35.3 G/DL (ref 31.4–37.4)
MCV RBC AUTO: 85 FL (ref 82–98)
MCV RBC AUTO: 89 FL (ref 82–98)
MCV RBC AUTO: 90 FL (ref 82–98)
MCV RBC AUTO: 90 FL (ref 82–98)
MONOCYTES # BLD AUTO: 0.7 THOUSAND/ΜL (ref 0.17–1.22)
MONOCYTES # BLD AUTO: 1.08 THOUSAND/ΜL (ref 0.17–1.22)
MONOCYTES # BLD AUTO: 1.2 THOUSAND/ΜL (ref 0.17–1.22)
MONOCYTES NFR BLD AUTO: 10 % (ref 4–12)
MONOCYTES NFR BLD AUTO: 7 % (ref 4–12)
MONOCYTES NFR BLD AUTO: 7 % (ref 4–12)
NASAL CANNULA: 5
NEUTROPHILS # BLD AUTO: 12.97 THOUSANDS/ΜL (ref 1.85–7.62)
NEUTROPHILS # BLD AUTO: 9.29 THOUSANDS/ΜL (ref 1.85–7.62)
NEUTROPHILS # BLD AUTO: 9.67 THOUSANDS/ΜL (ref 1.85–7.62)
NEUTS SEG NFR BLD AUTO: 79 % (ref 43–75)
NEUTS SEG NFR BLD AUTO: 86 % (ref 43–75)
NEUTS SEG NFR BLD AUTO: 87 % (ref 43–75)
NRBC BLD AUTO-RTO: 0 /100 WBCS
O2 CT BLDA-SCNC: 8.7 ML/DL (ref 16–23)
OVALOCYTES BLD QL SMEAR: PRESENT
OXYHGB MFR BLDA: 95.8 % (ref 94–97)
PCO2 BLD: 14 MMOL/L (ref 21–32)
PCO2 BLD: 23 MMOL/L (ref 21–32)
PCO2 BLD: 24 MMOL/L (ref 21–32)
PCO2 BLD: 29 MMOL/L (ref 21–32)
PCO2 BLD: 29 MMOL/L (ref 21–32)
PCO2 BLD: 29.2 MM HG (ref 36–44)
PCO2 BLD: 38.3 MM HG (ref 36–44)
PCO2 BLD: 48.3 MM HG (ref 36–44)
PCO2 BLD: 54.2 MM HG (ref 42–50)
PCO2 BLD: 55.8 MM HG (ref 36–44)
PCO2 BLDA: 38.8 MM HG (ref 36–44)
PH BLD: 7.18 [PH] (ref 7.35–7.45)
PH BLD: 7.26 [PH] (ref 7.35–7.45)
PH BLD: 7.31 [PH] (ref 7.3–7.4)
PH BLD: 7.36 [PH] (ref 7.35–7.45)
PH BLD: 7.39 [PH] (ref 7.35–7.45)
PH BLDA: 7.42 [PH] (ref 7.35–7.45)
PHOSPHATE SERPL-MCNC: 2 MG/DL (ref 2.3–4.1)
PHOSPHATE SERPL-MCNC: 4.5 MG/DL (ref 2.3–4.1)
PLATELET # BLD AUTO: 136 THOUSANDS/UL (ref 149–390)
PLATELET # BLD AUTO: 137 THOUSANDS/UL (ref 149–390)
PLATELET # BLD AUTO: 82 THOUSANDS/UL (ref 149–390)
PLATELET BLD QL SMEAR: ABNORMAL
PLATELET BLD QL SMEAR: ABNORMAL
PMV BLD AUTO: 8.9 FL (ref 8.9–12.7)
PMV BLD AUTO: 9.2 FL (ref 8.9–12.7)
PMV BLD AUTO: 9.8 FL (ref 8.9–12.7)
PO2 BLD: 313 MM HG (ref 75–129)
PO2 BLD: 388 MM HG (ref 75–129)
PO2 BLD: >400 MM HG (ref 35–45)
PO2 BLD: >400 MM HG (ref 75–129)
PO2 BLD: >400 MM HG (ref 75–129)
PO2 BLDA: 106.5 MM HG (ref 75–129)
POIKILOCYTOSIS BLD QL SMEAR: PRESENT
POIKILOCYTOSIS BLD QL SMEAR: PRESENT
POLYCHROMASIA BLD QL SMEAR: PRESENT
POTASSIUM BLD-SCNC: 2.8 MMOL/L (ref 3.5–5.3)
POTASSIUM BLD-SCNC: 3.9 MMOL/L (ref 3.5–5.3)
POTASSIUM BLD-SCNC: 4 MMOL/L (ref 3.5–5.3)
POTASSIUM BLD-SCNC: 4.2 MMOL/L (ref 3.5–5.3)
POTASSIUM BLD-SCNC: 5.1 MMOL/L (ref 3.5–5.3)
POTASSIUM SERPL-SCNC: 3.4 MMOL/L (ref 3.5–5.3)
POTASSIUM SERPL-SCNC: 3.7 MMOL/L (ref 3.5–5.3)
POTASSIUM SERPL-SCNC: 3.9 MMOL/L (ref 3.5–5.3)
POTASSIUM SERPL-SCNC: 4.2 MMOL/L (ref 3.5–5.3)
PROT SERPL-MCNC: 3.1 G/DL (ref 6.4–8.4)
PROT SERPL-MCNC: 3.7 G/DL (ref 6.4–8.4)
PROT SERPL-MCNC: 4.4 G/DL (ref 6.4–8.4)
PROT SERPL-MCNC: 4.9 G/DL (ref 6.4–8.4)
PROTHROMBIN TIME: 16.9 SECONDS (ref 12.3–15)
PROTHROMBIN TIME: 17 SECONDS (ref 12.3–15)
PROTHROMBIN TIME: 19.5 SECONDS (ref 12.3–15)
RBC # BLD AUTO: 1.87 MILLION/UL (ref 3.88–5.62)
RBC # BLD AUTO: 2.22 MILLION/UL (ref 3.88–5.62)
RBC # BLD AUTO: 2.43 MILLION/UL (ref 3.88–5.62)
RBC # BLD AUTO: 3.93 MILLION/UL (ref 3.88–5.62)
RBC MORPH BLD: PRESENT
RBC MORPH BLD: PRESENT
RH BLD: POSITIVE
RH BLD: POSITIVE
SAO2 % BLD FROM PO2: 100 % (ref 60–85)
SAO2 % BLD FROM PO2: 100 % (ref 60–85)
SODIUM BLD-SCNC: 141 MMOL/L (ref 136–145)
SODIUM BLD-SCNC: 142 MMOL/L (ref 136–145)
SODIUM BLD-SCNC: 142 MMOL/L (ref 136–145)
SODIUM BLD-SCNC: 143 MMOL/L (ref 136–145)
SODIUM BLD-SCNC: 147 MMOL/L (ref 136–145)
SODIUM SERPL-SCNC: 136 MMOL/L (ref 135–147)
SODIUM SERPL-SCNC: 137 MMOL/L (ref 135–147)
SODIUM SERPL-SCNC: 139 MMOL/L (ref 135–147)
SODIUM SERPL-SCNC: 140 MMOL/L (ref 135–147)
SPECIMEN EXPIRATION DATE: NORMAL
SPECIMEN EXPIRATION DATE: NORMAL
SPECIMEN SOURCE: ABNORMAL
UNIT DISPENSE STATUS: NORMAL
UNIT PRODUCT CODE: NORMAL
UNIT PRODUCT VOLUME: 280 ML
UNIT PRODUCT VOLUME: 280 ML
UNIT PRODUCT VOLUME: 300 ML
UNIT PRODUCT VOLUME: 350 ML
UNIT RH: NORMAL
WBC # BLD AUTO: 10.7 THOUSAND/UL (ref 4.31–10.16)
WBC # BLD AUTO: 12.22 THOUSAND/UL (ref 4.31–10.16)
WBC # BLD AUTO: 15.04 THOUSAND/UL (ref 4.31–10.16)
WBC # BLD AUTO: 15.53 THOUSAND/UL (ref 4.31–10.16)

## 2025-02-10 PROCEDURE — 86900 BLOOD TYPING SEROLOGIC ABO: CPT

## 2025-02-10 PROCEDURE — 0BH17EZ INSERTION OF ENDOTRACHEAL AIRWAY INTO TRACHEA, VIA NATURAL OR ARTIFICIAL OPENING: ICD-10-PCS | Performed by: EMERGENCY MEDICINE

## 2025-02-10 PROCEDURE — 85576 BLOOD PLATELET AGGREGATION: CPT | Performed by: INTERNAL MEDICINE

## 2025-02-10 PROCEDURE — 30233M1 TRANSFUSION OF NONAUTOLOGOUS PLASMA CRYOPRECIPITATE INTO PERIPHERAL VEIN, PERCUTANEOUS APPROACH: ICD-10-PCS | Performed by: ANESTHESIOLOGY

## 2025-02-10 PROCEDURE — 76937 US GUIDE VASCULAR ACCESS: CPT | Performed by: RADIOLOGY

## 2025-02-10 PROCEDURE — 74178 CT ABD&PLV WO CNTR FLWD CNTR: CPT

## 2025-02-10 PROCEDURE — P9017 PLASMA 1 DONOR FRZ W/IN 8 HR: HCPCS

## 2025-02-10 PROCEDURE — C1769 GUIDE WIRE: HCPCS

## 2025-02-10 PROCEDURE — 76937 US GUIDE VASCULAR ACCESS: CPT

## 2025-02-10 PROCEDURE — 30233N1 TRANSFUSION OF NONAUTOLOGOUS RED BLOOD CELLS INTO PERIPHERAL VEIN, PERCUTANEOUS APPROACH: ICD-10-PCS | Performed by: ANESTHESIOLOGY

## 2025-02-10 PROCEDURE — 82805 BLOOD GASES W/O2 SATURATION: CPT | Performed by: NURSE PRACTITIONER

## 2025-02-10 PROCEDURE — 94002 VENT MGMT INPAT INIT DAY: CPT

## 2025-02-10 PROCEDURE — 0W3P8ZZ CONTROL BLEEDING IN GASTROINTESTINAL TRACT, VIA NATURAL OR ARTIFICIAL OPENING ENDOSCOPIC: ICD-10-PCS | Performed by: STUDENT IN AN ORGANIZED HEALTH CARE EDUCATION/TRAINING PROGRAM

## 2025-02-10 PROCEDURE — 76937 US GUIDE VASCULAR ACCESS: CPT | Performed by: ANESTHESIOLOGY

## 2025-02-10 PROCEDURE — 84132 ASSAY OF SERUM POTASSIUM: CPT

## 2025-02-10 PROCEDURE — 80053 COMPREHEN METABOLIC PANEL: CPT | Performed by: NURSE PRACTITIONER

## 2025-02-10 PROCEDURE — 75726 ARTERY X-RAYS ABDOMEN: CPT | Performed by: RADIOLOGY

## 2025-02-10 PROCEDURE — 30233R1 TRANSFUSION OF NONAUTOLOGOUS PLATELETS INTO PERIPHERAL VEIN, PERCUTANEOUS APPROACH: ICD-10-PCS | Performed by: EMERGENCY MEDICINE

## 2025-02-10 PROCEDURE — 5A1945Z RESPIRATORY VENTILATION, 24-96 CONSECUTIVE HOURS: ICD-10-PCS | Performed by: EMERGENCY MEDICINE

## 2025-02-10 PROCEDURE — 85025 COMPLETE CBC W/AUTO DIFF WBC: CPT | Performed by: NURSE PRACTITIONER

## 2025-02-10 PROCEDURE — NC001 PR NO CHARGE: Performed by: NURSE PRACTITIONER

## 2025-02-10 PROCEDURE — 75774 ARTERY X-RAY EACH VESSEL: CPT | Performed by: RADIOLOGY

## 2025-02-10 PROCEDURE — 36620 INSERTION CATHETER ARTERY: CPT | Performed by: ANESTHESIOLOGY

## 2025-02-10 PROCEDURE — 80053 COMPREHEN METABOLIC PANEL: CPT

## 2025-02-10 PROCEDURE — 99291 CRITICAL CARE FIRST HOUR: CPT | Performed by: SURGERY

## 2025-02-10 PROCEDURE — 4A133B1 MONITORING OF ARTERIAL PRESSURE, PERIPHERAL, PERCUTANEOUS APPROACH: ICD-10-PCS | Performed by: ANESTHESIOLOGY

## 2025-02-10 PROCEDURE — 86850 RBC ANTIBODY SCREEN: CPT

## 2025-02-10 PROCEDURE — 85027 COMPLETE CBC AUTOMATED: CPT | Performed by: NURSE PRACTITIONER

## 2025-02-10 PROCEDURE — 30233R1 TRANSFUSION OF NONAUTOLOGOUS PLATELETS INTO PERIPHERAL VEIN, PERCUTANEOUS APPROACH: ICD-10-PCS | Performed by: ANESTHESIOLOGY

## 2025-02-10 PROCEDURE — P9037 PLATE PHERES LEUKOREDU IRRAD: HCPCS

## 2025-02-10 PROCEDURE — 85018 HEMOGLOBIN: CPT | Performed by: NURSE PRACTITIONER

## 2025-02-10 PROCEDURE — 4A133J1 MONITORING OF ARTERIAL PULSE, PERIPHERAL, PERCUTANEOUS APPROACH: ICD-10-PCS | Performed by: ANESTHESIOLOGY

## 2025-02-10 PROCEDURE — 0DJ08ZZ INSPECTION OF UPPER INTESTINAL TRACT, VIA NATURAL OR ARTIFICIAL OPENING ENDOSCOPIC: ICD-10-PCS | Performed by: INTERNAL MEDICINE

## 2025-02-10 PROCEDURE — 85025 COMPLETE CBC W/AUTO DIFF WBC: CPT

## 2025-02-10 PROCEDURE — 85397 CLOTTING FUNCT ACTIVITY: CPT | Performed by: INTERNAL MEDICINE

## 2025-02-10 PROCEDURE — 82330 ASSAY OF CALCIUM: CPT

## 2025-02-10 PROCEDURE — C1894 INTRO/SHEATH, NON-LASER: HCPCS

## 2025-02-10 PROCEDURE — C1887 CATHETER, GUIDING: HCPCS

## 2025-02-10 PROCEDURE — NC001 PR NO CHARGE: Performed by: PHYSICIAN ASSISTANT

## 2025-02-10 PROCEDURE — 36245 INS CATH ABD/L-EXT ART 1ST: CPT

## 2025-02-10 PROCEDURE — 03HY32Z INSERTION OF MONITORING DEVICE INTO UPPER ARTERY, PERCUTANEOUS APPROACH: ICD-10-PCS | Performed by: ANESTHESIOLOGY

## 2025-02-10 PROCEDURE — 94664 DEMO&/EVAL PT USE INHALER: CPT

## 2025-02-10 PROCEDURE — 86923 COMPATIBILITY TEST ELECTRIC: CPT

## 2025-02-10 PROCEDURE — 86901 BLOOD TYPING SEROLOGIC RH(D): CPT | Performed by: NURSE ANESTHETIST, CERTIFIED REGISTERED

## 2025-02-10 PROCEDURE — 71045 X-RAY EXAM CHEST 1 VIEW: CPT

## 2025-02-10 PROCEDURE — 85014 HEMATOCRIT: CPT

## 2025-02-10 PROCEDURE — C1052 HEMOSTATIC AGENT, GI, TOPIC: HCPCS

## 2025-02-10 PROCEDURE — 30233M1 TRANSFUSION OF NONAUTOLOGOUS PLASMA CRYOPRECIPITATE INTO PERIPHERAL VEIN, PERCUTANEOUS APPROACH: ICD-10-PCS | Performed by: EMERGENCY MEDICINE

## 2025-02-10 PROCEDURE — 85397 CLOTTING FUNCT ACTIVITY: CPT

## 2025-02-10 PROCEDURE — 36246 INS CATH ABD/L-EXT ART 2ND: CPT

## 2025-02-10 PROCEDURE — 83735 ASSAY OF MAGNESIUM: CPT | Performed by: NURSE PRACTITIONER

## 2025-02-10 PROCEDURE — 86900 BLOOD TYPING SEROLOGIC ABO: CPT | Performed by: NURSE ANESTHETIST, CERTIFIED REGISTERED

## 2025-02-10 PROCEDURE — 85610 PROTHROMBIN TIME: CPT | Performed by: NURSE PRACTITIONER

## 2025-02-10 PROCEDURE — 30233K1 TRANSFUSION OF NONAUTOLOGOUS FROZEN PLASMA INTO PERIPHERAL VEIN, PERCUTANEOUS APPROACH: ICD-10-PCS | Performed by: ANESTHESIOLOGY

## 2025-02-10 PROCEDURE — 82330 ASSAY OF CALCIUM: CPT | Performed by: NURSE PRACTITIONER

## 2025-02-10 PROCEDURE — 04V23DZ RESTRICTION OF GASTRIC ARTERY WITH INTRALUMINAL DEVICE, PERCUTANEOUS APPROACH: ICD-10-PCS | Performed by: RADIOLOGY

## 2025-02-10 PROCEDURE — 93005 ELECTROCARDIOGRAM TRACING: CPT

## 2025-02-10 PROCEDURE — 85347 COAGULATION TIME ACTIVATED: CPT | Performed by: INTERNAL MEDICINE

## 2025-02-10 PROCEDURE — 36247 INS CATH ABD/L-EXT ART 3RD: CPT | Performed by: RADIOLOGY

## 2025-02-10 PROCEDURE — 43235 EGD DIAGNOSTIC BRUSH WASH: CPT | Performed by: INTERNAL MEDICINE

## 2025-02-10 PROCEDURE — P9012 CRYOPRECIPITATE EACH UNIT: HCPCS

## 2025-02-10 PROCEDURE — 83605 ASSAY OF LACTIC ACID: CPT | Performed by: NURSE PRACTITIONER

## 2025-02-10 PROCEDURE — 85384 FIBRINOGEN ACTIVITY: CPT | Performed by: INTERNAL MEDICINE

## 2025-02-10 PROCEDURE — 85347 COAGULATION TIME ACTIVATED: CPT

## 2025-02-10 PROCEDURE — P9035 PLATELET PHERES LEUKOREDUCED: HCPCS

## 2025-02-10 PROCEDURE — P9016 RBC LEUKOCYTES REDUCED: HCPCS

## 2025-02-10 PROCEDURE — 94760 N-INVAS EAR/PLS OXIMETRY 1: CPT

## 2025-02-10 PROCEDURE — 37244 VASC EMBOLIZE/OCCLUDE BLEED: CPT | Performed by: RADIOLOGY

## 2025-02-10 PROCEDURE — 84295 ASSAY OF SERUM SODIUM: CPT

## 2025-02-10 PROCEDURE — 85027 COMPLETE CBC AUTOMATED: CPT

## 2025-02-10 PROCEDURE — 83735 ASSAY OF MAGNESIUM: CPT

## 2025-02-10 PROCEDURE — 30233H1 TRANSFUSION OF NONAUTOLOGOUS WHOLE BLOOD INTO PERIPHERAL VEIN, PERCUTANEOUS APPROACH: ICD-10-PCS | Performed by: EMERGENCY MEDICINE

## 2025-02-10 PROCEDURE — 84100 ASSAY OF PHOSPHORUS: CPT | Performed by: NURSE PRACTITIONER

## 2025-02-10 PROCEDURE — 82948 REAGENT STRIP/BLOOD GLUCOSE: CPT

## 2025-02-10 PROCEDURE — 30233N1 TRANSFUSION OF NONAUTOLOGOUS RED BLOOD CELLS INTO PERIPHERAL VEIN, PERCUTANEOUS APPROACH: ICD-10-PCS | Performed by: EMERGENCY MEDICINE

## 2025-02-10 PROCEDURE — 37242 VASC EMBOLIZE/OCCLUDE ARTERY: CPT

## 2025-02-10 PROCEDURE — 84100 ASSAY OF PHOSPHORUS: CPT

## 2025-02-10 PROCEDURE — 99291 CRITICAL CARE FIRST HOUR: CPT | Performed by: ANESTHESIOLOGY

## 2025-02-10 PROCEDURE — 02HV33Z INSERTION OF INFUSION DEVICE INTO SUPERIOR VENA CAVA, PERCUTANEOUS APPROACH: ICD-10-PCS | Performed by: ANESTHESIOLOGY

## 2025-02-10 PROCEDURE — 86901 BLOOD TYPING SEROLOGIC RH(D): CPT

## 2025-02-10 PROCEDURE — P9010 WHOLE BLOOD FOR TRANSFUSION: HCPCS

## 2025-02-10 PROCEDURE — 30233H1 TRANSFUSION OF NONAUTOLOGOUS WHOLE BLOOD INTO PERIPHERAL VEIN, PERCUTANEOUS APPROACH: ICD-10-PCS | Performed by: ANESTHESIOLOGY

## 2025-02-10 PROCEDURE — 85384 FIBRINOGEN ACTIVITY: CPT

## 2025-02-10 PROCEDURE — 85576 BLOOD PLATELET AGGREGATION: CPT

## 2025-02-10 PROCEDURE — 82947 ASSAY GLUCOSE BLOOD QUANT: CPT

## 2025-02-10 PROCEDURE — 82803 BLOOD GASES ANY COMBINATION: CPT

## 2025-02-10 PROCEDURE — 83605 ASSAY OF LACTIC ACID: CPT

## 2025-02-10 PROCEDURE — 85384 FIBRINOGEN ACTIVITY: CPT | Performed by: NURSE PRACTITIONER

## 2025-02-10 PROCEDURE — 36556 INSERT NON-TUNNEL CV CATH: CPT | Performed by: ANESTHESIOLOGY

## 2025-02-10 PROCEDURE — 86850 RBC ANTIBODY SCREEN: CPT | Performed by: NURSE ANESTHETIST, CERTIFIED REGISTERED

## 2025-02-10 PROCEDURE — 85610 PROTHROMBIN TIME: CPT

## 2025-02-10 PROCEDURE — 30233K1 TRANSFUSION OF NONAUTOLOGOUS FROZEN PLASMA INTO PERIPHERAL VEIN, PERCUTANEOUS APPROACH: ICD-10-PCS | Performed by: EMERGENCY MEDICINE

## 2025-02-10 PROCEDURE — NC001 PR NO CHARGE: Performed by: RADIOLOGY

## 2025-02-10 PROCEDURE — 99291 CRITICAL CARE FIRST HOUR: CPT | Performed by: EMERGENCY MEDICINE

## 2025-02-10 PROCEDURE — 43255 EGD CONTROL BLEEDING ANY: CPT | Performed by: STUDENT IN AN ORGANIZED HEALTH CARE EDUCATION/TRAINING PROGRAM

## 2025-02-10 RX ORDER — PANTOPRAZOLE SODIUM 40 MG/10ML
40 INJECTION, POWDER, LYOPHILIZED, FOR SOLUTION INTRAVENOUS EVERY 12 HOURS SCHEDULED
Status: CANCELLED | OUTPATIENT
Start: 2025-02-10

## 2025-02-10 RX ORDER — GLYCOPYRROLATE 0.2 MG/ML
INJECTION INTRAMUSCULAR; INTRAVENOUS AS NEEDED
Status: DISCONTINUED | OUTPATIENT
Start: 2025-02-10 | End: 2025-02-10

## 2025-02-10 RX ORDER — METOCLOPRAMIDE HYDROCHLORIDE 5 MG/ML
10 INJECTION INTRAMUSCULAR; INTRAVENOUS ONCE
Status: COMPLETED | OUTPATIENT
Start: 2025-02-10 | End: 2025-02-10

## 2025-02-10 RX ORDER — METOCLOPRAMIDE HYDROCHLORIDE 5 MG/ML
INJECTION INTRAMUSCULAR; INTRAVENOUS AS NEEDED
Status: DISCONTINUED | OUTPATIENT
Start: 2025-02-10 | End: 2025-02-10

## 2025-02-10 RX ORDER — MAGNESIUM SULFATE HEPTAHYDRATE 40 MG/ML
2 INJECTION, SOLUTION INTRAVENOUS ONCE
Status: COMPLETED | OUTPATIENT
Start: 2025-02-10 | End: 2025-02-11

## 2025-02-10 RX ORDER — FENTANYL CITRATE 50 UG/ML
100 INJECTION, SOLUTION INTRAMUSCULAR; INTRAVENOUS
Refills: 0 | Status: DISCONTINUED | OUTPATIENT
Start: 2025-02-10 | End: 2025-02-11

## 2025-02-10 RX ORDER — PHENYLEPHRINE HCL IN 0.9% NACL 1 MG/10 ML
SYRINGE (ML) INTRAVENOUS AS NEEDED
Status: DISCONTINUED | OUTPATIENT
Start: 2025-02-10 | End: 2025-02-10

## 2025-02-10 RX ORDER — SODIUM CHLORIDE 9 MG/ML
INJECTION, SOLUTION INTRAVENOUS CONTINUOUS PRN
Status: DISCONTINUED | OUTPATIENT
Start: 2025-02-10 | End: 2025-02-10

## 2025-02-10 RX ORDER — CHLORHEXIDINE GLUCONATE ORAL RINSE 1.2 MG/ML
15 SOLUTION DENTAL EVERY 12 HOURS SCHEDULED
Status: DISCONTINUED | OUTPATIENT
Start: 2025-02-10 | End: 2025-02-10

## 2025-02-10 RX ORDER — IODIXANOL 320 MG/ML
50 INJECTION, SOLUTION INTRAVASCULAR
Status: COMPLETED | OUTPATIENT
Start: 2025-02-10 | End: 2025-02-10

## 2025-02-10 RX ORDER — CALCIUM GLUCONATE 20 MG/ML
1 INJECTION, SOLUTION INTRAVENOUS ONCE
Status: COMPLETED | OUTPATIENT
Start: 2025-02-10 | End: 2025-02-10

## 2025-02-10 RX ORDER — EPINEPHRINE 1 MG/ML
INJECTION, SOLUTION, CONCENTRATE INTRAVENOUS AS NEEDED
Status: DISCONTINUED | OUTPATIENT
Start: 2025-02-10 | End: 2025-02-10 | Stop reason: HOSPADM

## 2025-02-10 RX ORDER — MAGNESIUM SULFATE HEPTAHYDRATE 40 MG/ML
2 INJECTION, SOLUTION INTRAVENOUS ONCE
Status: COMPLETED | OUTPATIENT
Start: 2025-02-10 | End: 2025-02-10

## 2025-02-10 RX ORDER — METOCLOPRAMIDE HYDROCHLORIDE 5 MG/ML
10 INJECTION INTRAMUSCULAR; INTRAVENOUS EVERY 6 HOURS SCHEDULED
Status: DISCONTINUED | OUTPATIENT
Start: 2025-02-11 | End: 2025-02-11

## 2025-02-10 RX ORDER — CHLORHEXIDINE GLUCONATE ORAL RINSE 1.2 MG/ML
15 SOLUTION DENTAL EVERY 12 HOURS SCHEDULED
Status: DISCONTINUED | OUTPATIENT
Start: 2025-02-10 | End: 2025-02-16 | Stop reason: HOSPADM

## 2025-02-10 RX ORDER — PROPOFOL 10 MG/ML
INJECTION, EMULSION INTRAVENOUS AS NEEDED
Status: DISCONTINUED | OUTPATIENT
Start: 2025-02-10 | End: 2025-02-10

## 2025-02-10 RX ORDER — MIDAZOLAM HYDROCHLORIDE 2 MG/2ML
INJECTION, SOLUTION INTRAMUSCULAR; INTRAVENOUS AS NEEDED
Status: DISCONTINUED | OUTPATIENT
Start: 2025-02-10 | End: 2025-02-10

## 2025-02-10 RX ORDER — CALCIUM GLUCONATE 20 MG/ML
1 INJECTION, SOLUTION INTRAVENOUS ONCE
Status: DISCONTINUED | OUTPATIENT
Start: 2025-02-10 | End: 2025-02-10

## 2025-02-10 RX ORDER — PROPOFOL 10 MG/ML
5-50 INJECTION, EMULSION INTRAVENOUS
Status: DISCONTINUED | OUTPATIENT
Start: 2025-02-10 | End: 2025-02-12

## 2025-02-10 RX ORDER — ETOMIDATE 2 MG/ML
INJECTION INTRAVENOUS AS NEEDED
Status: DISCONTINUED | OUTPATIENT
Start: 2025-02-10 | End: 2025-02-10

## 2025-02-10 RX ORDER — TRANEXAMIC ACID 10 MG/ML
INJECTION, SOLUTION INTRAVENOUS AS NEEDED
Status: DISCONTINUED | OUTPATIENT
Start: 2025-02-10 | End: 2025-02-10

## 2025-02-10 RX ORDER — CALCIUM CHLORIDE 100 MG/ML
INJECTION INTRAVENOUS; INTRAVENTRICULAR AS NEEDED
Status: DISCONTINUED | OUTPATIENT
Start: 2025-02-10 | End: 2025-02-10

## 2025-02-10 RX ORDER — ONDANSETRON 2 MG/ML
4 INJECTION INTRAMUSCULAR; INTRAVENOUS EVERY 6 HOURS PRN
Status: CANCELLED | OUTPATIENT
Start: 2025-02-10

## 2025-02-10 RX ORDER — SODIUM CHLORIDE, SODIUM LACTATE, POTASSIUM CHLORIDE, CALCIUM CHLORIDE 600; 310; 30; 20 MG/100ML; MG/100ML; MG/100ML; MG/100ML
125 INJECTION, SOLUTION INTRAVENOUS CONTINUOUS
Status: DISCONTINUED | OUTPATIENT
Start: 2025-02-10 | End: 2025-02-11

## 2025-02-10 RX ORDER — PANTOPRAZOLE SODIUM 40 MG/10ML
40 INJECTION, POWDER, LYOPHILIZED, FOR SOLUTION INTRAVENOUS EVERY 12 HOURS SCHEDULED
Status: DISCONTINUED | OUTPATIENT
Start: 2025-02-10 | End: 2025-02-14

## 2025-02-10 RX ORDER — SODIUM CHLORIDE, SODIUM LACTATE, POTASSIUM CHLORIDE, CALCIUM CHLORIDE 600; 310; 30; 20 MG/100ML; MG/100ML; MG/100ML; MG/100ML
125 INJECTION, SOLUTION INTRAVENOUS CONTINUOUS
Status: DISCONTINUED | OUTPATIENT
Start: 2025-02-10 | End: 2025-02-10

## 2025-02-10 RX ORDER — CEFTRIAXONE 1 G/50ML
1000 INJECTION, SOLUTION INTRAVENOUS EVERY 24 HOURS
Status: CANCELLED | OUTPATIENT
Start: 2025-02-11 | End: 2025-02-13

## 2025-02-10 RX ORDER — FENTANYL CITRATE 50 UG/ML
INJECTION, SOLUTION INTRAMUSCULAR; INTRAVENOUS AS NEEDED
Status: DISCONTINUED | OUTPATIENT
Start: 2025-02-10 | End: 2025-02-10

## 2025-02-10 RX ORDER — CHLORHEXIDINE GLUCONATE ORAL RINSE 1.2 MG/ML
15 SOLUTION DENTAL EVERY 12 HOURS SCHEDULED
Status: CANCELLED | OUTPATIENT
Start: 2025-02-10

## 2025-02-10 RX ORDER — SODIUM CHLORIDE, SODIUM LACTATE, POTASSIUM CHLORIDE, CALCIUM CHLORIDE 600; 310; 30; 20 MG/100ML; MG/100ML; MG/100ML; MG/100ML
125 INJECTION, SOLUTION INTRAVENOUS CONTINUOUS
Status: DISCONTINUED | OUTPATIENT
Start: 2025-02-10 | End: 2025-02-10 | Stop reason: HOSPADM

## 2025-02-10 RX ORDER — SODIUM CHLORIDE, SODIUM LACTATE, POTASSIUM CHLORIDE, CALCIUM CHLORIDE 600; 310; 30; 20 MG/100ML; MG/100ML; MG/100ML; MG/100ML
INJECTION, SOLUTION INTRAVENOUS CONTINUOUS PRN
Status: DISCONTINUED | OUTPATIENT
Start: 2025-02-10 | End: 2025-02-10

## 2025-02-10 RX ORDER — PROPOFOL 10 MG/ML
INJECTION, EMULSION INTRAVENOUS CONTINUOUS PRN
Status: DISCONTINUED | OUTPATIENT
Start: 2025-02-10 | End: 2025-02-10

## 2025-02-10 RX ORDER — FENTANYL CITRATE-0.9 % NACL/PF 10 MCG/ML
50 PLASTIC BAG, INJECTION (ML) INTRAVENOUS CONTINUOUS
Status: DISCONTINUED | OUTPATIENT
Start: 2025-02-10 | End: 2025-02-12

## 2025-02-10 RX ORDER — ALBUMIN HUMAN 50 G/1000ML
SOLUTION INTRAVENOUS CONTINUOUS PRN
Status: DISCONTINUED | OUTPATIENT
Start: 2025-02-10 | End: 2025-02-10

## 2025-02-10 RX ORDER — ROCURONIUM BROMIDE 10 MG/ML
INJECTION, SOLUTION INTRAVENOUS AS NEEDED
Status: DISCONTINUED | OUTPATIENT
Start: 2025-02-10 | End: 2025-02-10

## 2025-02-10 RX ORDER — SUCCINYLCHOLINE/SOD CL,ISO/PF 100 MG/5ML
SYRINGE (ML) INTRAVENOUS AS NEEDED
Status: DISCONTINUED | OUTPATIENT
Start: 2025-02-10 | End: 2025-02-10

## 2025-02-10 RX ORDER — SODIUM CHLORIDE, SODIUM LACTATE, POTASSIUM CHLORIDE, CALCIUM CHLORIDE 600; 310; 30; 20 MG/100ML; MG/100ML; MG/100ML; MG/100ML
125 INJECTION, SOLUTION INTRAVENOUS CONTINUOUS
Status: CANCELLED | OUTPATIENT
Start: 2025-02-10

## 2025-02-10 RX ORDER — ONDANSETRON 2 MG/ML
4 INJECTION INTRAMUSCULAR; INTRAVENOUS EVERY 6 HOURS PRN
Status: DISCONTINUED | OUTPATIENT
Start: 2025-02-10 | End: 2025-02-16 | Stop reason: HOSPADM

## 2025-02-10 RX ORDER — TRANEXAMIC ACID 10 MG/ML
1000 INJECTION, SOLUTION INTRAVENOUS ONCE
Status: COMPLETED | OUTPATIENT
Start: 2025-02-10 | End: 2025-02-10

## 2025-02-10 RX ADMIN — PROPOFOL 50 MG: 10 INJECTION, EMULSION INTRAVENOUS at 14:08

## 2025-02-10 RX ADMIN — FENTANYL CITRATE 100 MCG: 50 INJECTION INTRAMUSCULAR; INTRAVENOUS at 21:09

## 2025-02-10 RX ADMIN — FENTANYL CITRATE 50 MCG: 50 INJECTION INTRAMUSCULAR; INTRAVENOUS at 18:09

## 2025-02-10 RX ADMIN — Medication 100 MG: at 18:09

## 2025-02-10 RX ADMIN — MAGNESIUM SULFATE HEPTAHYDRATE 2 G: 40 INJECTION, SOLUTION INTRAVENOUS at 07:04

## 2025-02-10 RX ADMIN — POTASSIUM PHOSPHATE 30 MMOL: 236; 224 INJECTION, SOLUTION INTRAVENOUS at 07:38

## 2025-02-10 RX ADMIN — SODIUM CHLORIDE: 0.9 INJECTION, SOLUTION INTRAVENOUS at 18:10

## 2025-02-10 RX ADMIN — PROPOFOL 30 MG: 10 INJECTION, EMULSION INTRAVENOUS at 14:12

## 2025-02-10 RX ADMIN — PROPOFOL 20 MG: 10 INJECTION, EMULSION INTRAVENOUS at 14:16

## 2025-02-10 RX ADMIN — Medication 40 MG: at 21:15

## 2025-02-10 RX ADMIN — PROPOFOL 20 MG: 10 INJECTION, EMULSION INTRAVENOUS at 14:34

## 2025-02-10 RX ADMIN — GLYCOPYRROLATE 0.2 MG: 0.2 INJECTION, SOLUTION INTRAMUSCULAR; INTRAVENOUS at 19:49

## 2025-02-10 RX ADMIN — SODIUM CHLORIDE, SODIUM LACTATE, POTASSIUM CHLORIDE, AND CALCIUM CHLORIDE 125 ML/HR: .6; .31; .03; .02 INJECTION, SOLUTION INTRAVENOUS at 13:57

## 2025-02-10 RX ADMIN — PROPOFOL 20 MG: 10 INJECTION, EMULSION INTRAVENOUS at 14:19

## 2025-02-10 RX ADMIN — PANTOPRAZOLE SODIUM 40 MG: 40 INJECTION, POWDER, FOR SOLUTION INTRAVENOUS at 08:00

## 2025-02-10 RX ADMIN — Medication 100 MCG: at 14:31

## 2025-02-10 RX ADMIN — SODIUM CHLORIDE, SODIUM LACTATE, POTASSIUM CHLORIDE, AND CALCIUM CHLORIDE: .6; .31; .03; .02 INJECTION, SOLUTION INTRAVENOUS at 13:16

## 2025-02-10 RX ADMIN — PROPOFOL 25 MCG/KG/MIN: 10 INJECTION, EMULSION INTRAVENOUS at 23:50

## 2025-02-10 RX ADMIN — CEFTRIAXONE SODIUM 1000 MG: 10 INJECTION, POWDER, FOR SOLUTION INTRAVENOUS at 21:15

## 2025-02-10 RX ADMIN — METOCLOPRAMIDE 10 MG: 5 INJECTION, SOLUTION INTRAMUSCULAR; INTRAVENOUS at 20:03

## 2025-02-10 RX ADMIN — TRANEXAMIC ACID 1000 MG: 10 INJECTION, SOLUTION INTRAVENOUS at 11:48

## 2025-02-10 RX ADMIN — ALBUMIN (HUMAN): 12.5 INJECTION, SOLUTION INTRAVENOUS at 18:18

## 2025-02-10 RX ADMIN — TRANEXAMIC ACID 1000 MG: 10 INJECTION, SOLUTION INTRAVENOUS at 18:28

## 2025-02-10 RX ADMIN — MIDAZOLAM 2 MG: 1 INJECTION INTRAMUSCULAR; INTRAVENOUS at 18:31

## 2025-02-10 RX ADMIN — MAGNESIUM SULFATE HEPTAHYDRATE 2 G: 40 INJECTION, SOLUTION INTRAVENOUS at 21:46

## 2025-02-10 RX ADMIN — PROPOFOL 20 MG: 10 INJECTION, EMULSION INTRAVENOUS at 14:27

## 2025-02-10 RX ADMIN — Medication 100 MCG: at 14:13

## 2025-02-10 RX ADMIN — IODIXANOL 112 ML: 320 INJECTION, SOLUTION INTRAVASCULAR at 19:46

## 2025-02-10 RX ADMIN — NOREPINEPHRINE BITARTRATE 4 MCG/MIN: 1 INJECTION, SOLUTION, CONCENTRATE INTRAVENOUS at 15:47

## 2025-02-10 RX ADMIN — ETOMIDATE 10 MG: 20 INJECTION, SOLUTION INTRAVENOUS at 18:09

## 2025-02-10 RX ADMIN — Medication 0.5 MG: at 14:16

## 2025-02-10 RX ADMIN — PROPOFOL 30 MG: 10 INJECTION, EMULSION INTRAVENOUS at 14:24

## 2025-02-10 RX ADMIN — IOHEXOL 72 ML: 350 INJECTION, SOLUTION INTRAVENOUS at 12:32

## 2025-02-10 RX ADMIN — Medication 5 MCG/MIN: at 17:34

## 2025-02-10 RX ADMIN — Medication 50 MCG/HR: at 20:54

## 2025-02-10 RX ADMIN — CHLORHEXIDINE GLUCONATE 0.12% ORAL RINSE 15 ML: 1.2 LIQUID ORAL at 20:54

## 2025-02-10 RX ADMIN — CALCIUM GLUCONATE 1 G: 20 INJECTION, SOLUTION INTRAVENOUS at 16:22

## 2025-02-10 RX ADMIN — IOHEXOL 100 ML: 350 INJECTION, SOLUTION INTRAVENOUS at 12:10

## 2025-02-10 RX ADMIN — Medication 100 MCG: at 14:16

## 2025-02-10 RX ADMIN — FOLIC ACID: 5 INJECTION, SOLUTION INTRAMUSCULAR; INTRAVENOUS; SUBCUTANEOUS at 09:07

## 2025-02-10 RX ADMIN — CALCIUM CHLORIDE 1 G: 100 INJECTION INTRAVENOUS; INTRAVENTRICULAR at 18:20

## 2025-02-10 RX ADMIN — CALCIUM CHLORIDE 1 G: 100 INJECTION INTRAVENOUS; INTRAVENTRICULAR at 19:11

## 2025-02-10 RX ADMIN — ONDANSETRON 4 MG: 2 INJECTION INTRAMUSCULAR; INTRAVENOUS at 16:44

## 2025-02-10 RX ADMIN — SODIUM CHLORIDE, SODIUM LACTATE, POTASSIUM CHLORIDE, AND CALCIUM CHLORIDE 1000 ML: .6; .31; .03; .02 INJECTION, SOLUTION INTRAVENOUS at 17:45

## 2025-02-10 RX ADMIN — NOREPINEPHRINE BITARTRATE 16 MCG: 1 INJECTION, SOLUTION, CONCENTRATE INTRAVENOUS at 18:32

## 2025-02-10 RX ADMIN — CALCIUM CHLORIDE 1 G: 100 INJECTION INTRAVENOUS; INTRAVENTRICULAR at 18:32

## 2025-02-10 RX ADMIN — PROPOFOL 50 MCG/KG/MIN: 10 INJECTION, EMULSION INTRAVENOUS at 19:21

## 2025-02-10 RX ADMIN — Medication 100 MCG: at 14:10

## 2025-02-10 RX ADMIN — CHLORHEXIDINE GLUCONATE 15 ML: 1.2 SOLUTION ORAL at 08:00

## 2025-02-10 RX ADMIN — METOCLOPRAMIDE 10 MG: 5 INJECTION, SOLUTION INTRAMUSCULAR; INTRAVENOUS at 11:22

## 2025-02-10 RX ADMIN — Medication 100 MCG: at 14:24

## 2025-02-10 RX ADMIN — ROCURONIUM BROMIDE 50 MG: 10 INJECTION, SOLUTION INTRAVENOUS at 18:29

## 2025-02-10 RX ADMIN — SODIUM CHLORIDE, SODIUM LACTATE, POTASSIUM CHLORIDE, AND CALCIUM CHLORIDE 125 ML/HR: .6; .31; .03; .02 INJECTION, SOLUTION INTRAVENOUS at 22:17

## 2025-02-10 RX ADMIN — PROPOFOL 30 MG: 10 INJECTION, EMULSION INTRAVENOUS at 14:31

## 2025-02-10 RX ADMIN — IOHEXOL 54 ML: 350 INJECTION, SOLUTION INTRAVENOUS at 12:42

## 2025-02-10 RX ADMIN — PANTOPRAZOLE SODIUM 40 MG: 40 INJECTION, POWDER, FOR SOLUTION INTRAVENOUS at 20:53

## 2025-02-10 NOTE — PROCEDURES
Central Line Insertion    Date/Time: 2/10/2025 4:12 PM    Performed by: Gadiel Schulz MD  Authorized by: Gadiel Schulz MD    Patient location:  ICU  Other Assisting Provider: Yes (comment) (Liza Lopessania)    Consent:     Consent obtained:  Emergent situation and verbal    Consent given by:  Patient    Risks discussed:  Arterial puncture, incorrect placement, nerve damage, bleeding, infection and pneumothorax    Alternatives discussed:  No treatment, delayed treatment and alternative treatment  Universal protocol:     Procedure explained and questions answered to patient or proxy's satisfaction: yes      Relevant documents present and verified: yes      Test results available and properly labeled: yes      Required blood products, implants, devices, and special equipment available: yes      Patient identity confirmed:  Verbally with patient and hospital-assigned identification number  Pre-procedure details:     Hand hygiene: Hand hygiene performed prior to insertion      Sterile barrier technique: All elements of maximal sterile technique followed      Skin preparation:  2% chlorhexidine    Skin preparation agent: Skin preparation agent completely dried prior to procedure    Indications:     Central line indications: medications requiring central line      Site selection rationale:  Poor PIV access, large transfusion requirement with active GI bleeding  Anesthesia (see MAR for exact dosages):     Anesthesia method:  Local infiltration    Local anesthetic:  Lidocaine 1% w/o epi  Procedure details:     Location:  Right internal jugular    Vessel type: vein      Laterality:  Right    Approach: percutaneous technique used      Patient position:  Trendelenburg    Catheter type:  Double lumen (double lumen MAC)    Catheter size:  9 Fr    Landmarks identified: yes      Ultrasound guidance: yes      Ultrasound image availability:  Images available in PACS    Sterile ultrasound techniques: Sterile gel and sterile  probe covers were used      Manometry confirmation: no      Number of attempts:  2    Successful placement: yes      Catheter tip vessel location: atriocaval junction    Post-procedure details:     Post-procedure:  Dressing applied and line sutured    Assessment:  Blood return through all ports, no pneumothorax on x-ray, placement verified by x-ray and free fluid flow    Post-procedure complications: none      Patient tolerance of procedure:  Tolerated well, no immediate complications

## 2025-02-10 NOTE — ASSESSMENT & PLAN NOTE
Pt states he had one episode of bright red blood tinged sputum at home as well as 6 episodes of dark-colored diarrhea that occurred this am.   Pt with a positive occult blood in the ED  (2/8) Pt underwent EGD. Esophagus was clear; however stomach full of blood and clots. Unable to get clear view of mucosal lining. No active bleed noted. Plan for possible re-scope Monday unless further decompensation then will scope 2/9.  (2/9) EGD: Single ulcer in the duodenum with nonbleeding visible vessel (Axel IIA); placed 1 clip successfully; injected epinephrine to address bleeding     Plan:  Received 3 units PRBC 2/7, 4 units PRBC 2/8, 3 units PRBC and 2 FFP 2/10  Continue Protonix gtt  S/p EGD, NGT was placed and 600 ml of blood was removed. NGT removed 2/2 pt discomfort  Trend Hgb and transfuse for Hgb <7.0  Monitor for any active signs of bleeding  GI consulted and following

## 2025-02-10 NOTE — TELEMEDICINE
e-Consult (IPC)  - Interventional Radiology  Christian Liz 64 y.o. male MRN: [id number]  Unit/Bed#: ICU 10-01 Encounter: [id number]          Interventional Radiology has been consulted to evaluate Christian Liz    We were consulted by critical care concerning this patient with GI bleed.  Patient was recently scoped by GI however there is concern for ongoing bleeding and bright red blood per rectum..  Critical care is plan to take the patient for a high-volume bleeding scan and is asked IR to be involved in the event that the patient need emergent angiography with embolization.  Patient has been requiring blood product transfusion.    Inpatient Consult to IR  Consult performed by: Dexter Stevenson PA-C  Consult ordered by: LI Loza        02/10/25    Assessment/Recommendation:     GI Bleeding  -Tentative plan for emergent angiography with embolization pending results of bleeding scan      11-20 minutes, >50% of the total time devoted to medical consultative verbal/EMR discussion between providers. Written report will be generated in the EMR.     Thank you for allowing Interventional Radiology to participate in the care of Christian Liz. Please don't hesitate to call or TigerText us with any questions.     Dexter Stevenson PA-C

## 2025-02-10 NOTE — ANESTHESIA POSTPROCEDURE EVALUATION
Post-Op Assessment Note    CV Status:  Stable    Pain management: adequate       Mental Status:  Alert and awake   Hydration Status:  Euvolemic   PONV Controlled:  Controlled   Airway Patency:  Patent     Post Op Vitals Reviewed: Yes    No anethesia notable event occurred.    Staff: Anesthesiologist           Last Filed PACU Vitals:  Vitals Value Taken Time   Temp 97.1 °F (36.2 °C) 02/10/25 0710   Pulse 124 02/10/25 1042   BP 77/48 02/10/25 1040   Resp 26 02/10/25 1042   SpO2 93 % 02/10/25 1042   Vitals shown include unfiled device data.
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
## 2025-02-10 NOTE — SEPSIS NOTE
"  Sepsis Note   Christian Liz 64 y.o. male MRN: [id number]  Unit/Bed#: MICU 07 Encounter: [id number]       Initial Sepsis Screening       Row Name 02/10/25 1838                Is the patient's history suggestive of a new or worsening infection? No  -ML        Assess for evidence of organ dysfunction: Are any of the below criteria present within 6 hours of suspected infection and SIRS criteria that are NOT considered to be chronic conditions? --        Sepsis Note: Click \"NEXT\" below (NOT \"close\") to generate sepsis note based on above information. --                  User Key  (r) = Recorded By, (t) = Taken By, (c) = Cosigned By      Initials Name Provider Type    ML Elizabeth Cruz MD Fellow                        There is no height or weight on file to calculate BMI.  Wt Readings from Last 1 Encounters:   02/10/25 61 kg (134 lb 7.7 oz)        Ideal body weight: 70.7 kg (155 lb 13.8 oz)    "
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
## 2025-02-10 NOTE — PLAN OF CARE
Problem: PAIN - ADULT  Goal: Verbalizes/displays adequate comfort level or baseline comfort level  Description: Interventions:  - Encourage patient to monitor pain and request assistance  - Assess pain using appropriate pain scale  - Administer analgesics based on type and severity of pain and evaluate response  - Implement non-pharmacological measures as appropriate and evaluate response  - Consider cultural and social influences on pain and pain management  - Notify physician/advanced practitioner if interventions unsuccessful or patient reports new pain  Outcome: Progressing     Problem: INFECTION - ADULT  Goal: Absence or prevention of progression during hospitalization  Description: INTERVENTIONS:  - Assess and monitor for signs and symptoms of infection  - Monitor lab/diagnostic results  - Monitor all insertion sites, i.e. indwelling lines, tubes, and drains  - Monitor endotracheal if appropriate and nasal secretions for changes in amount and color  - [place] appropriate cooling/warming therapies per order  - Administer medications as ordered  - Instruct and encourage patient and family to use good hand hygiene technique  - Identify and instruct in appropriate isolation precautions for identified infection/condition  Outcome: Progressing  Goal: Absence of fever/infection during neutropenic period  Description: INTERVENTIONS:  - Monitor WBC    Outcome: Progressing     Problem: SAFETY ADULT  Goal: Patient will remain free of falls  Description: INTERVENTIONS:  - Educate patient/family on patient safety including physical limitations  - Instruct patient to call for assistance with activity   - Consult OT/PT to assist with strengthening/mobility   - Keep Call bell within reach  - Keep bed low and locked with side rails adjusted as appropriate  - Keep care items and personal belongings within reach  - Initiate and maintain comfort rounds  - Make Fall Risk Sign visible to staff  - Offer Toileting every 2 Hours,  in advance of need  - Initiate/Maintain bed alarm  - Obtain necessary fall risk management equipment  - Apply yellow socks and bracelet for high fall risk patients  - Consider moving patient to room near nurses station  Outcome: Progressing  Goal: Maintain or return to baseline ADL function  Description: INTERVENTIONS:  -  Assess patient's ability to carry out ADLs; assess patient's baseline for ADL function and identify physical deficits which impact ability to perform ADLs (bathing, care of mouth/teeth, toileting, grooming, dressing, etc.)  - Assess/evaluate cause of self-care deficits   - Assess range of motion  - Assess patient's mobility; develop plan if impaired  - Assess patient's need for assistive devices and provide as appropriate  - Encourage maximum independence but intervene and supervise when necessary  - Involve family in performance of ADLs  - Assess for home care needs following discharge   - Consider OT consult to assist with ADL evaluation and planning for discharge  - Provide patient education as appropriate  Outcome: Progressing  Goal: Maintains/Returns to pre admission functional level  Description: INTERVENTIONS:  - Perform AM-PAC 6 Click Basic Mobility/ Daily Activity assessment daily.  - Set and communicate daily mobility goal to care team and patient/family/caregiver.   - Collaborate with rehabilitation services on mobility goals if consulted  - Perform Range of Motion 3 times a day.  - Reposition patient every 2 hours.  - Dangle patient 3 times a day  - Stand patient 3 times a day  - Ambulate patient 3 times a day  - Out of bed to chair 3 times a day   - Out of bed for meals 3 times a day  - Out of bed for toileting  - Record patient progress and toleration of activity level   Outcome: Progressing     Problem: DISCHARGE PLANNING  Goal: Discharge to home or other facility with appropriate resources  Description: INTERVENTIONS:  - Identify barriers to discharge w/patient and caregiver  -  Arrange for needed discharge resources and transportation as appropriate  - Identify discharge learning needs (meds, wound care, etc.)  - Arrange for interpretive services to assist at discharge as needed  - Refer to Case Management Department for coordinating discharge planning if the patient needs post-hospital services based on physician/advanced practitioner order or complex needs related to functional status, cognitive ability, or social support system  Outcome: Progressing     Problem: Knowledge Deficit  Goal: Patient/family/caregiver demonstrates understanding of disease process, treatment plan, medications, and discharge instructions  Description: Complete learning assessment and assess knowledge base.  Interventions:  - Provide teaching at level of understanding  - Provide teaching via preferred learning methods  Outcome: Progressing     Problem: CARDIOVASCULAR - ADULT  Goal: Maintains optimal cardiac output and hemodynamic stability  Description: INTERVENTIONS:  - Monitor I/O, vital signs and rhythm  - Monitor for S/S and trends of decreased cardiac output  - Administer and titrate ordered vasoactive medications to optimize hemodynamic stability  - Assess quality of pulses, skin color and temperature  - Assess for signs of decreased coronary artery perfusion  - Instruct patient to report change in severity of symptoms  Outcome: Progressing  Goal: Absence of cardiac dysrhythmias or at baseline rhythm  Description: INTERVENTIONS:  - Continuous cardiac monitoring, vital signs, obtain 12 lead EKG if ordered  - Administer antiarrhythmic and heart rate control medications as ordered  - Monitor electrolytes and administer replacement therapy as ordered  Outcome: Progressing     Problem: GASTROINTESTINAL - ADULT  Goal: Minimal or absence of nausea and/or vomiting  Description: INTERVENTIONS:  - Administer IV fluids if ordered to ensure adequate hydration  - Maintain NPO status until nausea and vomiting are  resolved  - Nasogastric tube if ordered  - Administer ordered antiemetic medications as needed  - Provide nonpharmacologic comfort measures as appropriate  - Advance diet as tolerated, if ordered  - Consider nutrition services referral to assist patient with adequate nutrition and appropriate food choices  Outcome: Progressing  Goal: Maintains or returns to baseline bowel function  Description: INTERVENTIONS:  - Assess bowel function  - Encourage oral fluids to ensure adequate hydration  - Administer IV fluids if ordered to ensure adequate hydration  - Administer ordered medications as needed  - Encourage mobilization and activity  - Consider nutritional services referral to assist patient with adequate nutrition and appropriate food choices  Outcome: Progressing  Goal: Maintains adequate nutritional intake  Description: INTERVENTIONS:  - Monitor percentage of each meal consumed  - Identify factors contributing to decreased intake, treat as appropriate  - Assist with meals as needed  - Monitor I&O, weight, and lab values if indicated  - Obtain nutrition services referral as needed  Outcome: Progressing  Goal: Establish and maintain optimal ostomy function  Description: INTERVENTIONS:  - Assess bowel function  - Encourage oral fluids to ensure adequate hydration  - Administer IV fluids if ordered to ensure adequate hydration   - Administer ordered medications as needed  - Encourage mobilization and activity  - Nutrition services referral to assist patient with appropriate food choices  - Assess stoma site  - Consider wound care consult   Outcome: Progressing  Goal: Oral mucous membranes remain intact  Description: INTERVENTIONS  - Assess oral mucosa and hygiene practices  - Implement preventative oral hygiene regimen  - Implement oral medicated treatments as ordered  - Initiate Nutrition services referral as needed  Outcome: Progressing     Problem: METABOLIC, FLUID AND ELECTROLYTES - ADULT  Goal: Electrolytes  maintained within normal limits  Description: INTERVENTIONS:  - Monitor labs and assess patient for signs and symptoms of electrolyte imbalances  - Administer electrolyte replacement as ordered  - Monitor response to electrolyte replacements, including repeat lab results as appropriate  - Instruct patient on fluid and nutrition as appropriate  Outcome: Progressing  Goal: Fluid balance maintained  Description: INTERVENTIONS:  - Monitor labs   - Monitor I/O and WT  - Instruct patient on fluid and nutrition as appropriate  - Assess for signs & symptoms of volume excess or deficit  Outcome: Progressing  Goal: Glucose maintained within target range  Description: INTERVENTIONS:  - Monitor Blood Glucose as ordered  - Assess for signs and symptoms of hyperglycemia and hypoglycemia  - Administer ordered medications to maintain glucose within target range  - Assess nutritional intake and initiate nutrition service referral as needed  Outcome: Progressing     Problem: SKIN/TISSUE INTEGRITY - ADULT  Goal: Skin Integrity remains intact(Skin Breakdown Prevention)  Description: Assess:  -Perform Bryan assessment every shift  -Clean and moisturize skin   -Inspect skin when repositioning, toileting, and assisting with ADLS  -Assess under medical devices   -Assess extremities for adequate circulation and sensation     Bed Management:  -Have minimal linens on bed & keep smooth, unwrinkled  -Change linens as needed when moist or perspiring  -Avoid sitting or lying in one position for more than 2 hours while in bed  -Keep HOB at 30 degrees     Toileting:  -Offer bedside commode  -Assess for incontinence   -Use incontinent care products after each incontinent episode    Activity:  -Mobilize patient 3 times a day  -Encourage activity and walks on unit  -Encourage or provide ROM exercises   -Turn and reposition patient every 2 Hours  -Use appropriate equipment to lift or move patient in bed  -Instruct/ Assist with weight shifting when out  of bed in chair  -Consider limitation of chair time 2 hour intervals    Skin Care:  -Avoid use of baby powder, tape, friction and shearing, hot water or constrictive clothing  -Relieve pressure over bony prominences using mepelex  -Do not massage red bony areas    Next Steps:  -Teach patient strategies to minimize risks   -Consider consults to  interdisciplinary teams   Outcome: Progressing     Problem: HEMATOLOGIC - ADULT  Goal: Maintains hematologic stability  Description: INTERVENTIONS  - Assess for signs and symptoms of bleeding or hemorrhage  - Monitor labs  - Administer supportive blood products/factors as ordered and appropriate  Outcome: Progressing     Problem: MUSCULOSKELETAL - ADULT  Goal: Maintain or return mobility to safest level of function  Description: INTERVENTIONS:  - Assess patient's ability to carry out ADLs; assess patient's baseline for ADL function and identify physical deficits which impact ability to perform ADLs (bathing, care of mouth/teeth, toileting, grooming, dressing, etc.)  - Assess/evaluate cause of self-care deficits   - Assess range of motion  - Assess patient's mobility  - Assess patient's need for assistive devices and provide as appropriate  - Encourage maximum independence but intervene and supervise when necessary  - Involve family in performance of ADLs  - Assess for home care needs following discharge   - Consider OT consult to assist with ADL evaluation and planning for discharge  - Provide patient education as appropriate  Outcome: Progressing  Goal: Maintain proper alignment of affected body part  Description: INTERVENTIONS:  - Support, maintain and protect limb and body alignment  - Provide patient/ family with appropriate education  Outcome: Progressing     Problem: Nutrition/Hydration-ADULT  Goal: Nutrient/Hydration intake appropriate for improving, restoring or maintaining nutritional needs  Description: Monitor and assess patient's nutrition/hydration status for  malnutrition. Collaborate with interdisciplinary team and initiate plan and interventions as ordered.  Monitor patient's weight and dietary intake as ordered or per policy. Utilize nutrition screening tool and intervene as necessary. Determine patient's food preferences and provide high-protein, high-caloric foods as appropriate.     INTERVENTIONS:  - Monitor oral intake, urinary output, labs, and treatment plans  - Assess nutrition and hydration status and recommend course of action  - Evaluate amount of meals eaten  - Assist patient with eating if necessary   - Allow adequate time for meals  - Recommend/ encourage appropriate diets, oral nutritional supplements, and vitamin/mineral supplements  - Order, calculate, and assess calorie counts as needed  - Recommend, monitor, and adjust tube feedings and TPN/PPN based on assessed needs  - Assess need for intravenous fluids  - Provide specific nutrition/hydration education as appropriate  - Include patient/family/caregiver in decisions related to nutrition  Outcome: Progressing     Problem: Prexisting or High Potential for Compromised Skin Integrity  Goal: Skin integrity is maintained or improved  Description: INTERVENTIONS:  - Identify patients at risk for skin breakdown  - Assess and monitor skin integrity  - Assess and monitor nutrition and hydration status  - Monitor labs   - Assess for incontinence   - Turn and reposition patient  - Assist with mobility/ambulation  - Relieve pressure over bony prominences  - Avoid friction and shearing  - Provide appropriate hygiene as needed including keeping skin clean and dry  - Evaluate need for skin moisturizer/barrier cream  - Collaborate with interdisciplinary team   - Patient/family teaching  - Consider wound care consult   Outcome: Progressing
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
## 2025-02-10 NOTE — ANESTHESIA PREPROCEDURE EVALUATION
Procedure:  PRE-OP ONLY    Relevant Problems   CARDIO   (+) Primary hypertension      HEMATOLOGY   (+) Acute blood loss anemia      Smoking    Diagnosis: Hemorrhagic Shock in the setting of ABLA 2/2 bleeding duodenal ulcer  Presented with melena, found to be hypothermic, hypotensive, tachycardic  Continuous cardiopulmonary monitoring. Maintain MAP >65. Monitor resuscitative endpoints.   Continue levophed gtt - upon leaving [facility] 6mcg/min     2/7/25    Sinus tachycardia  Otherwise normal ECG  When compared with ECG of 07-Feb-2025 20:13, (unconfirmed)  No significant change was found    Anesthesia Plan  ASA Score- 3 Emergent    Anesthesia Type- general with ASA Monitors.         Additional Monitors:     Airway Plan: ETT.           Plan Factors-Exercise tolerance (METS): >4 METS.                          Induction-     Postoperative Plan-         Informed Consent-   Hb 5.9    RLL pneumonia      S/p 3 units pRBC 2/7, 4 units pRBC 2/8, and 3 units pRBC and 2 units FFP on 2/10       NPO Status:  No vitals data found for the desired time range.
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
## 2025-02-10 NOTE — ASSESSMENT & PLAN NOTE
He has never previously had a colonoscopy so this should be scheduled as an outpatient for routine colon cancer screening. Can be arranged during follow up visit

## 2025-02-10 NOTE — QUICK NOTE
Patient's primary  Ananya (Wife) along with Vannesa (Daughter) was called today and updated regarding patient's current clinical progress and plans of care upon arrival to [facility]. All questions were answered and concerns were addressed to satisfaction.     The primary contact number the family prefers is the patients Wife, Ananya Liz ([phone]).      The patient's son Miguel plans to arrive to [facility] around 6pm.     Vanessa Chavez M.D.  [facility]  Internal Medicine Residency, PGY-1

## 2025-02-10 NOTE — ANESTHESIA POSTPROCEDURE EVALUATION
Post-Op Assessment Note    CV Status:  Stable    Pain management: adequate       Mental Status:  Alert   Hydration Status:  Stable   PONV Controlled:  None   Airway Patency:  Patent     Post Op Vitals Reviewed: Yes    No anethesia notable event occurred.    Staff: Anesthesiologist           Last Filed PACU Vitals:  Vitals Value Taken Time   Temp     Pulse 72 02/10/25 1454   /61 02/10/25 1452   Resp 23 02/10/25 1454   SpO2 100 % 02/10/25 1454   Vitals shown include unfiled device data.

## 2025-02-10 NOTE — ASSESSMENT & PLAN NOTE
Presented with ABLA and melena. Initial EGD on 2/8 with large clot in stomach unable to be irrigated limiting exam. Patient decompensated early morning on 2/9 with hematemesis and worsening hemodynamics, underwent repeat endoscopy revealing large, cratered duodenal ulcer with visible vessel that was treated with epi and clips with hemostasis achieved. Visualized portions of stomach and esophagus appeared normal.    Patient continued to have bloody clotted material in stools overnight. Initially HDS apart from tachycardia, off pressor support but had another BM around 10AM and became hypotensive and more tachycardic after the episode. Repeat Hb noted to be 5.8 compared to 7.6 at 4AM. Concern for ongoing vs rebleed. Last PO intake around 8AM, clear liquids.     Notified by primary team around 11:30 that patient now having bright red blood per rectum. Planning to obtain high volume bleeding scan     Plan:   - continue IV PPI   - two large bore IV at all times  - resuscitation per primary team with blood products, crystalloid  - primary planning to obtain bleeding scan due to progression to BRBPR. Recommend discussion with IR given HD instability and concern for brisk bleed. Pending results of scan, if no brisk bleed and remains stable can plan for EGD early afternoon based on current schedule availability  - please keep NPO   - H pylori stool Ag pending

## 2025-02-10 NOTE — ASSESSMENT & PLAN NOTE
Hemorrhagic shock in the setting of acute GIB   S/p total 13 U PRBC, 5 FFP, 1 Plt, 1 Cryo and TXA  Ongoing blood resuscitation for HD instability   S/p EGD x 3 with attempted embolization, cauterization and clipping of bleeding arterial vessel visible in duodenal ulceration  Transfer to B for IR intervention
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
## 2025-02-10 NOTE — ANESTHESIA PREPROCEDURE EVALUATION
Procedure:  EGD     - off pressor support since this AM after transfusion   - s/p bleed scan negative for obvious extravasation   - previous clip no longer in position, attempt to replace or cauterize    EKG (02/07/25):  sinus tachycardia, , Qtc 461    Relevant Problems   ANESTHESIA (within normal limits)      CARDIO   (+) Primary hypertension      ENDO (within normal limits)      GI/HEPATIC (within normal limits)      /RENAL (within normal limits)      GYN (within normal limits)      HEMATOLOGY   (+) Acute blood loss anemia      MUSCULOSKELETAL (within normal limits)      NEURO/PSYCH (within normal limits)      PULMONARY (within normal limits)      Behavioral Health   (+) Alcohol abuse      Surgery/Wound/Pain   (+) Shock (HCC)      Lab Results   Component Value Date    WBC 15.53 (H) 02/10/2025    HGB 5.8 (LL) 02/10/2025    HCT 21.9 (L) 02/10/2025    MCV 90 02/10/2025     (L) 02/10/2025     Lab Results   Component Value Date    SODIUM 136 02/10/2025    K 3.4 (L) 02/10/2025     02/10/2025    CO2 25 02/10/2025    AGAP 4 02/10/2025    BUN 23 02/10/2025    CREATININE 0.46 (L) 02/10/2025    GLUC 103 02/10/2025    CALCIUM 7.7 (L) 02/10/2025    AST 11 (L) 02/10/2025    ALT 10 02/10/2025    ALKPHOS 30 (L) 02/10/2025    TP 4.4 (L) 02/10/2025    TBILI 0.61 02/10/2025    EGFR 118 02/10/2025     Lab Results   Component Value Date    PTT 27 02/07/2025     Lab Results   Component Value Date    INR 1.33 (H) 02/10/2025    INR 1.38 (H) 02/09/2025    INR 1.32 (H) 02/07/2025    PROTIME 17.0 (H) 02/10/2025    PROTIME 17.4 (H) 02/09/2025    PROTIME 16.8 (H) 02/07/2025       Physical Exam    Airway    Mallampati score: III  TM Distance: >3 FB  Neck ROM: full     Dental       Cardiovascular  Rhythm: regular, Rate: abnormal    Pulmonary  Pulmonary exam normal     Other Findings        Anesthesia Plan  ASA Score- 3 Emergent    Anesthesia Type- IV sedation with anesthesia with ASA Monitors.         Additional Monitors:      Airway Plan:     Comment: IV sedation, GA/ETT as back up, discussed with GI team.       Plan Factors-Exercise tolerance (METS): >4 METS.    Chart reviewed. EKG reviewed. Imaging results reviewed. Existing labs reviewed. Patient summary reviewed.                  Induction- intravenous.    Postoperative Plan-         Informed Consent- Anesthetic plan and risks discussed with patient.  I personally reviewed this patient with the CRNA. Discussed and agreed on the Anesthesia Plan with the CRNA..      NPO Status:  Vitals Value Taken Time   Date of last liquid 01/31/25 02/09/25 0746   Time of last liquid 2200 02/08/25 1111   Date of last solid 01/31/25 02/09/25 0746   Time of last solid 0900 02/08/25 1111

## 2025-02-10 NOTE — QUICK NOTE
Spoke with patients wife (Ananya) to inform her of change in patients clinical status while in interventional radiology suite.  Ananya expressed concern that she did not feel her  fully understood what he agreed to previously when discussing code status during last hospitalization. Ananya would like for patient to be LEVEL 1 FULL CODE moving forward.   Will update code status to level 1 following procedure with plan to discuss with patient and family tomorrow.     Vanessa Chavez M.D.  [facility]  Transitional Year Residency, PGY-1
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
## 2025-02-10 NOTE — ANESTHESIA POSTPROCEDURE EVALUATION
Post-Op Assessment Note    CV Status:  Stable  Pain Score: 0    Pain management: adequate       Mental Status:  Sleepy   Hydration Status:  Stable   PONV Controlled:  None   Airway Patency:  Patent     Post Op Vitals Reviewed: Yes    No anethesia notable event occurred.    Staff: CRNA           Last Filed PACU Vitals:  Vitals Value Taken Time   Temp     Pulse 76    BP 99/55    Resp 16    SpO2 95%

## 2025-02-10 NOTE — ANESTHESIA PREPROCEDURE EVALUATION
Procedure:  IR EMBOLIZATION (SPECIFY VESSEL OR SITE)    Relevant Problems   CARDIO   (+) Primary hypertension      HEMATOLOGY   (+) Acute blood loss anemia        Physical Exam    Airway    Mallampati score: emergent case         Dental   No notable dental hx     Cardiovascular  Rhythm: regular, Rate: normal    Pulmonary   Breath sounds clear to auscultation    Other Findings  Intercisor Distance > 3cm          Anesthesia Plan  ASA Score- 4 Emergent    Anesthesia Type- general with ASA Monitors.         Additional Monitors: arterial line and central venous line.    Airway Plan: ETT.    Comment: Discussed benefits/risks of general anesthesia including possibility of mouth/throat pain, injury to lips/teeth, nausea/vomiting, and surgical pain along with more rare complications such as stroke, MI, pneumonia, aspiration, and injury to blood vessels. All questions answered.  .       Plan Factors-Exercise tolerance (METS): >4 METS.    Chart reviewed. EKG reviewed.  Existing labs reviewed.                   Induction- intravenous and rapid sequence induction.    Postoperative Plan-     Perioperative Resuscitation Plan - Level 1 - Full Code.       Informed Consent- Anesthetic plan and risks discussed with patient.  I personally reviewed this patient with the CRNA. Discussed and agreed on the Anesthesia Plan with the CRNA..      NPO Status:  No vitals data found for the desired time range.

## 2025-02-10 NOTE — TELEMEDICINE
e-Consult (IPC)  - Interventional Radiology  Christian Liz 64 y.o. male MRN: [id number]  Unit/Bed#: MICU 07 Encounter: [id number]          Interventional Radiology has been consulted to evaluate Christian Liz      Inpatient Consult to IR  Consult performed by: Sonia Coy MD  Consult ordered by: Obi Mane MD        02/10/25    Assessment/Recommendation:   Pt with bleeding duodenal ulcer.      EGD with inability to achieve hematasis.    IR consult for emergent angio/embo.    IR angio stat with anesthesia support.       11-20 minutes, >50% of the total time devoted to medical consultative verbal/EMR discussion between providers. Written report will be generated in the EMR.     Thank you for allowing Interventional Radiology to participate in the care of Christian Liz. Please don't hesitate to call or TigerText us with any questions.     Sonia Coy MD
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
## 2025-02-10 NOTE — PROCEDURES
Arterial Line Insertion    Date/Time: 2/10/2025 5:47 PM    Performed by: LI Loza  Authorized by: LI Loza    Patient location:  ICU  Consent:     Consent obtained:  Emergent situation    Consent given by:  Patient    Risks discussed:  Bleeding, ischemia, repeat procedure, infection and pain  Universal protocol:     Relevant documents present and verified: yes      Test results available and properly labeled: yes      Radiology Images displayed and confirmed.  If images not available, report reviewed: yes      Required blood products, implants, devices, and special equipment available: yes      Site/side marked: yes      Immediately prior to procedure a time out was called: yes      Patient identity confirmed:  Verbally with patient  Indications:     Indications: hemodynamic monitoring, multiple ABGs, continuous blood pressure monitoring and frequent labs / infusion    Pre-procedure details:     Skin preparation:  Chlorhexidine  Anesthesia (see MAR for exact dosages):     Anesthesia method:  Local infiltration    Local anesthetic:  Lidocaine 1% w/o epi  Procedure details:     Location / Tip of Catheter:  Radial    Laterality:  Left    Niraj's test performed: yes      Niraj's test abnormal: no      Needle gauge:  20 G    Placement technique:  Percutaneous and ultrasound guided    Ultrasound image availability:  Not obtained due to urgency    Sterile ultrasound techniques: Sterile gel and sterile probe covers were used      Number of attempts:  2    Successful placement: yes      Transducer: waveform confirmed    Post-procedure details:     Post-procedure:  Secured with tape and sterile dressing applied    CMS:  Normal and unchanged    Patient tolerance of procedure:  Tolerated well, no immediate complications
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
## 2025-02-10 NOTE — NURSING NOTE
0700- assumed care of pt. VSS, pt Aox4. IV's intact    0734- Pt OOB to chair- VSS     0805- pt assisted to commode by nursing staff. Pt not complaining of any dizziness, normotensive. BM loose/ watery with black and brown specks    0909- pt assisted to commode- BM dark red w/ clots. CRNP made aware of change since last BM. Pt not complaining of dizziness, normotensive.     1450-4712: pt assisted to commode. This RN made aware that pt c/o dizziness. Nursing staff assisted pt back to bed. Patient diaphoretic, hypotensive and tachycardic. CRNP made aware and at bedside. Pt experiencing balwinder red blood in BM. Critical alert for Hgb- 5.8     7984-6647: 2 u PRBCs infused    1148- TXA infusing at this time: See MAR    1205- pt taken down for STAT high volume bleeding CT scan  - pt not taken to IR d/t no active bleeding seen on scan    1257- 1320: 2 u plasma infusing at this time    1323- platelets infusing at this time    1345- Pt escorted to OR for EGD by OR RN and PCA

## 2025-02-10 NOTE — QUICK NOTE
Met with son Miguel in person. Explained patient is currently in IR for embolization. Confirmed wife will be the decision maker if patient lost his power of decision making. Also confirmed level 3 status and knowing patient will need temporary reverse his code status during procedure.    During conversation quetsions answered
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
## 2025-02-10 NOTE — PROGRESS NOTES
Progress Note - Gastroenterology   Name: Christian Liz 64 y.o. male I MRN: [id number]  Unit/Bed#: ICU 10-01 I Date of Admission: 2/7/2025   Date of Service: 2/10/2025 I Hospital Day: 3    Assessment & Plan  Acute blood loss anemia  Presented with ABLA and melena. Initial EGD on 2/8 with large clot in stomach unable to be irrigated limiting exam. Patient decompensated early morning on 2/9 with hematemesis and worsening hemodynamics, underwent repeat endoscopy revealing large, cratered duodenal ulcer with visible vessel that was treated with epi and clips with hemostasis achieved. Visualized portions of stomach and esophagus appeared normal.    Patient continued to have bloody clotted material in stools overnight. Initially HDS apart from tachycardia, off pressor support but had another BM around 10AM and became hypotensive and more tachycardic after the episode. Repeat Hb noted to be 5.8 compared to 7.6 at 4AM. Concern for ongoing vs rebleed. Last PO intake around 8AM, clear liquids.     Notified by primary team around 11:30 that patient now having bright red blood per rectum. Planning to obtain high volume bleeding scan     Plan:   - continue IV PPI   - two large bore IV at all times  - resuscitation per primary team with blood products, crystalloid  - primary planning to obtain bleeding scan due to progression to BRBPR. Recommend discussion with IR given HD instability and concern for brisk bleed. Pending results of scan, if no brisk bleed and remains stable can plan for EGD early afternoon based on current schedule availability  - please keep NPO   - H pylori stool Ag pending  Melena  See above  Colon cancer screening  He has never previously had a colonoscopy so this should be scheduled as an outpatient for routine colon cancer screening. Can be arranged during follow up visit     I have discussed the above management plan in detail with the primary service.   Gastroenterology service will  follow.    Subjective   Pt evaluated at bedside. Sitting up in chair. Feels like he has to have a bowel movement. Continued bloody clot output overnight and this morning.     Objective :  Temp:  [97.1 °F (36.2 °C)-98.4 °F (36.9 °C)] 97.1 °F (36.2 °C)  HR:  [] 81  BP: ()/(43-72) 116/72  Resp:  [10-42] 21  SpO2:  [91 %-100 %] 96 %  O2 Device: Nasal cannula  Nasal Cannula O2 Flow Rate (L/min):  [2 L/min] 2 L/min    Physical Exam  Constitutional:       General: He is in acute distress (anxious, urgent need to have BM, RN notified and helped pt to restroom).      Appearance: He is diaphoretic.   Cardiovascular:      Rate and Rhythm: Tachycardia present.   Pulmonary:      Effort: No respiratory distress.   Abdominal:      General: Abdomen is flat.   Neurological:      Mental Status: He is alert.   Psychiatric:      Comments: Anxious             Lab Results: I have reviewed the following results:CBC/BMP:   .     02/10/25  0433   WBC 15.53*   HGB 7.6*   HCT 21.9*   *   SODIUM 136   K 3.4*      CO2 25   BUN 23   CREATININE 0.46*   GLUC 103   MG 1.8*   PHOS 2.0*    , LFTs:   .     02/10/25  0433   AST 11*   ALT 10   ALB 2.6*   TBILI 0.61   ALKPHOS 30*        Imaging Results Review: I reviewed radiology reports from this admission including: CT chest and CT abdomen/pelvis.  Other Study Results Review: Other studies reviewed include: EGD s

## 2025-02-11 ENCOUNTER — APPOINTMENT (INPATIENT)
Dept: RADIOLOGY | Facility: HOSPITAL | Age: 65
DRG: 853 | End: 2025-02-11
Payer: COMMERCIAL

## 2025-02-11 ENCOUNTER — APPOINTMENT (INPATIENT)
Dept: GASTROENTEROLOGY | Facility: HOSPITAL | Age: 65
DRG: 853 | End: 2025-02-11
Payer: COMMERCIAL

## 2025-02-11 ENCOUNTER — APPOINTMENT (INPATIENT)
Dept: NON INVASIVE DIAGNOSTICS | Facility: HOSPITAL | Age: 65
DRG: 853 | End: 2025-02-11
Payer: COMMERCIAL

## 2025-02-11 LAB
2HR DELTA HS TROPONIN: 3 NG/L
4HR DELTA HS TROPONIN: 9 NG/L
ABO GROUP BLD BPU: NORMAL
ALBUMIN SERPL BCG-MCNC: 3.1 G/DL (ref 3.5–5)
ALP SERPL-CCNC: 56 U/L (ref 34–104)
ALT SERPL W P-5'-P-CCNC: 15 U/L (ref 7–52)
ANION GAP SERPL CALCULATED.3IONS-SCNC: 4 MMOL/L (ref 4–13)
AORTIC ROOT: 2.4 CM
ASCENDING AORTA: 2.6 CM
AST SERPL W P-5'-P-CCNC: 17 U/L (ref 13–39)
ATRIAL RATE: 49 BPM
BILIRUB SERPL-MCNC: 2.59 MG/DL (ref 0.2–1)
BPU ID: NORMAL
BSA FOR ECHO PROCEDURE: 1.87 M2
BUN SERPL-MCNC: 24 MG/DL (ref 5–25)
CALCIUM ALBUM COR SERPL-MCNC: 9.6 MG/DL (ref 8.3–10.1)
CALCIUM SERPL-MCNC: 8.9 MG/DL (ref 8.4–10.2)
CARDIAC TROPONIN I PNL SERPL HS: 12 NG/L (ref ?–50)
CARDIAC TROPONIN I PNL SERPL HS: 18 NG/L (ref ?–50)
CARDIAC TROPONIN I PNL SERPL HS: 9 NG/L (ref ?–50)
CHLORIDE SERPL-SCNC: 108 MMOL/L (ref 96–108)
CO2 SERPL-SCNC: 30 MMOL/L (ref 21–32)
CREAT SERPL-MCNC: 0.62 MG/DL (ref 0.6–1.3)
CROSSMATCH: NORMAL
E WAVE DECELERATION TIME: 230 MS
E/A RATIO: 1.01
ERYTHROCYTE [DISTWIDTH] IN BLOOD BY AUTOMATED COUNT: 15.1 % (ref 11.6–15.1)
ERYTHROCYTE [DISTWIDTH] IN BLOOD BY AUTOMATED COUNT: 15.7 % (ref 11.6–15.1)
ERYTHROCYTE [DISTWIDTH] IN BLOOD BY AUTOMATED COUNT: 16.3 % (ref 11.6–15.1)
FRACTIONAL SHORTENING: 30 (ref 28–44)
GFR SERPL CREATININE-BSD FRML MDRD: 104 ML/MIN/1.73SQ M
GLUCOSE SERPL-MCNC: 92 MG/DL (ref 65–140)
HCT VFR BLD AUTO: 34.6 % (ref 36.5–49.3)
HCT VFR BLD AUTO: 35.6 % (ref 36.5–49.3)
HCT VFR BLD AUTO: 36.1 % (ref 36.5–49.3)
HCT VFR BLD AUTO: 36.5 % (ref 36.5–49.3)
HCT VFR BLD AUTO: 36.6 % (ref 36.5–49.3)
HGB BLD-MCNC: 12.3 G/DL (ref 12–17)
HGB BLD-MCNC: 12.4 G/DL (ref 12–17)
HGB BLD-MCNC: 12.8 G/DL (ref 12–17)
HGB BLD-MCNC: 12.9 G/DL (ref 12–17)
HGB BLD-MCNC: 12.9 G/DL (ref 12–17)
INTERVENTRICULAR SEPTUM IN DIASTOLE (PARASTERNAL SHORT AXIS VIEW): 1 CM
INTERVENTRICULAR SEPTUM: 1 CM (ref 0.6–1.1)
IVC: 29 MM
LAAS-AP2: 11.1 CM2
LAAS-AP4: 14.1 CM2
LEFT ATRIUM SIZE: 2.8 CM
LEFT ATRIUM VOLUME (MOD BIPLANE): 32 ML
LEFT ATRIUM VOLUME INDEX (MOD BIPLANE): 17.1 ML/M2
LEFT INTERNAL DIMENSION IN SYSTOLE: 3.5 CM (ref 2.1–4)
LEFT VENTRICULAR INTERNAL DIMENSION IN DIASTOLE: 5 CM (ref 3.5–6)
LEFT VENTRICULAR POSTERIOR WALL IN END DIASTOLE: 0.8 CM
LEFT VENTRICULAR STROKE VOLUME: 65 ML
LV EF US.2D.A4C+ESTIMATED: 57 %
LVSV (TEICH): 65 ML
MAGNESIUM SERPL-MCNC: 2.2 MG/DL (ref 1.9–2.7)
MCH RBC QN AUTO: 29.4 PG (ref 26.8–34.3)
MCH RBC QN AUTO: 30.1 PG (ref 26.8–34.3)
MCH RBC QN AUTO: 30.2 PG (ref 26.8–34.3)
MCHC RBC AUTO-ENTMCNC: 35.3 G/DL (ref 31.4–37.4)
MCHC RBC AUTO-ENTMCNC: 35.8 G/DL (ref 31.4–37.4)
MCHC RBC AUTO-ENTMCNC: 36 G/DL (ref 31.4–37.4)
MCV RBC AUTO: 83 FL (ref 82–98)
MCV RBC AUTO: 84 FL (ref 82–98)
MCV RBC AUTO: 84 FL (ref 82–98)
MV E'TISSUE VEL-LAT: 15 CM/S
MV E'TISSUE VEL-SEP: 15 CM/S
MV PEAK A VEL: 0.83 M/S
MV PEAK E VEL: 84 CM/S
MV STENOSIS PRESSURE HALF TIME: 67 MS
MV VALVE AREA P 1/2 METHOD: 3.28
P AXIS: 89 DEGREES
PHOSPHATE SERPL-MCNC: 3.9 MG/DL (ref 2.3–4.1)
PLATELET # BLD AUTO: 66 THOUSANDS/UL (ref 149–390)
PLATELET # BLD AUTO: 74 THOUSANDS/UL (ref 149–390)
PLATELET # BLD AUTO: 95 THOUSANDS/UL (ref 149–390)
PMV BLD AUTO: 10.1 FL (ref 8.9–12.7)
PMV BLD AUTO: 10.2 FL (ref 8.9–12.7)
PMV BLD AUTO: 9.2 FL (ref 8.9–12.7)
POTASSIUM SERPL-SCNC: 4 MMOL/L (ref 3.5–5.3)
PR INTERVAL: 152 MS
PROCALCITONIN SERPL-MCNC: 0.55 NG/ML
PROT SERPL-MCNC: 5 G/DL (ref 6.4–8.4)
QRS AXIS: 83 DEGREES
QRSD INTERVAL: 100 MS
QT INTERVAL: 474 MS
QTC INTERVAL: 428 MS
RA PRESSURE ESTIMATED: 15 MMHG
RBC # BLD AUTO: 4.1 MILLION/UL (ref 3.88–5.62)
RBC # BLD AUTO: 4.25 MILLION/UL (ref 3.88–5.62)
RBC # BLD AUTO: 4.39 MILLION/UL (ref 3.88–5.62)
RIGHT ATRIUM AREA SYSTOLE A4C: 12.3 CM2
RIGHT VENTRICLE ID DIMENSION: 2.9 CM
SL CV LEFT ATRIUM LENGTH A2C: 3.6 CM
SL CV LV EF: 60
SL CV PED ECHO LEFT VENTRICLE DIASTOLIC VOLUME (MOD BIPLANE) 2D: 116 ML
SL CV PED ECHO LEFT VENTRICLE SYSTOLIC VOLUME (MOD BIPLANE) 2D: 52 ML
SODIUM SERPL-SCNC: 142 MMOL/L (ref 135–147)
T WAVE AXIS: 78 DEGREES
TRICUSPID ANNULAR PLANE SYSTOLIC EXCURSION: 2.5 CM
TSH SERPL DL<=0.05 MIU/L-ACNC: 0.75 UIU/ML (ref 0.45–4.5)
UNIT DISPENSE STATUS: NORMAL
UNIT PRODUCT CODE: NORMAL
UNIT PRODUCT VOLUME: 125 ML
UNIT PRODUCT VOLUME: 249 ML
UNIT PRODUCT VOLUME: 250 ML
UNIT PRODUCT VOLUME: 280 ML
UNIT PRODUCT VOLUME: 300 ML
UNIT PRODUCT VOLUME: 350 ML
UNIT PRODUCT VOLUME: 500 ML
UNIT PRODUCT VOLUME: 500 ML
UNIT RH: NORMAL
VENTRICULAR RATE: 49 BPM
WBC # BLD AUTO: 13.18 THOUSAND/UL (ref 4.31–10.16)
WBC # BLD AUTO: 9.36 THOUSAND/UL (ref 4.31–10.16)
WBC # BLD AUTO: 9.89 THOUSAND/UL (ref 4.31–10.16)

## 2025-02-11 PROCEDURE — 71045 X-RAY EXAM CHEST 1 VIEW: CPT

## 2025-02-11 PROCEDURE — NC001 PR NO CHARGE: Performed by: PHYSICIAN ASSISTANT

## 2025-02-11 PROCEDURE — 94760 N-INVAS EAR/PLS OXIMETRY 1: CPT

## 2025-02-11 PROCEDURE — 84100 ASSAY OF PHOSPHORUS: CPT | Performed by: NURSE PRACTITIONER

## 2025-02-11 PROCEDURE — 87205 SMEAR GRAM STAIN: CPT

## 2025-02-11 PROCEDURE — 87070 CULTURE OTHR SPECIMN AEROBIC: CPT

## 2025-02-11 PROCEDURE — 85027 COMPLETE CBC AUTOMATED: CPT

## 2025-02-11 PROCEDURE — 83735 ASSAY OF MAGNESIUM: CPT | Performed by: NURSE PRACTITIONER

## 2025-02-11 PROCEDURE — 93306 TTE W/DOPPLER COMPLETE: CPT

## 2025-02-11 PROCEDURE — 87081 CULTURE SCREEN ONLY: CPT

## 2025-02-11 PROCEDURE — 84145 PROCALCITONIN (PCT): CPT

## 2025-02-11 PROCEDURE — 87106 FUNGI IDENTIFICATION YEAST: CPT

## 2025-02-11 PROCEDURE — 93306 TTE W/DOPPLER COMPLETE: CPT | Performed by: INTERNAL MEDICINE

## 2025-02-11 PROCEDURE — 93010 ELECTROCARDIOGRAM REPORT: CPT | Performed by: INTERNAL MEDICINE

## 2025-02-11 PROCEDURE — 94003 VENT MGMT INPAT SUBQ DAY: CPT

## 2025-02-11 PROCEDURE — 80053 COMPREHEN METABOLIC PANEL: CPT | Performed by: NURSE PRACTITIONER

## 2025-02-11 PROCEDURE — 36556 INSERT NON-TUNNEL CV CATH: CPT | Performed by: INTERNAL MEDICINE

## 2025-02-11 PROCEDURE — 84484 ASSAY OF TROPONIN QUANT: CPT

## 2025-02-11 PROCEDURE — 85014 HEMATOCRIT: CPT

## 2025-02-11 PROCEDURE — 76937 US GUIDE VASCULAR ACCESS: CPT | Performed by: INTERNAL MEDICINE

## 2025-02-11 PROCEDURE — 84443 ASSAY THYROID STIM HORMONE: CPT

## 2025-02-11 PROCEDURE — 85018 HEMOGLOBIN: CPT

## 2025-02-11 PROCEDURE — 99223 1ST HOSP IP/OBS HIGH 75: CPT | Performed by: INTERNAL MEDICINE

## 2025-02-11 PROCEDURE — 99233 SBSQ HOSP IP/OBS HIGH 50: CPT | Performed by: SURGERY

## 2025-02-11 PROCEDURE — 02HV33Z INSERTION OF INFUSION DEVICE INTO SUPERIOR VENA CAVA, PERCUTANEOUS APPROACH: ICD-10-PCS | Performed by: INTERNAL MEDICINE

## 2025-02-11 PROCEDURE — 99291 CRITICAL CARE FIRST HOUR: CPT | Performed by: INTERNAL MEDICINE

## 2025-02-11 RX ORDER — FENTANYL CITRATE 50 UG/ML
50 INJECTION, SOLUTION INTRAMUSCULAR; INTRAVENOUS
Refills: 0 | Status: DISCONTINUED | OUTPATIENT
Start: 2025-02-11 | End: 2025-02-12

## 2025-02-11 RX ADMIN — FENTANYL CITRATE 50 MCG: 50 INJECTION INTRAMUSCULAR; INTRAVENOUS at 06:59

## 2025-02-11 RX ADMIN — METOCLOPRAMIDE 10 MG: 5 INJECTION, SOLUTION INTRAMUSCULAR; INTRAVENOUS at 02:46

## 2025-02-11 RX ADMIN — CEFTRIAXONE SODIUM 1000 MG: 10 INJECTION, POWDER, FOR SOLUTION INTRAVENOUS at 19:54

## 2025-02-11 RX ADMIN — CHLORHEXIDINE GLUCONATE 0.12% ORAL RINSE 15 ML: 1.2 LIQUID ORAL at 20:02

## 2025-02-11 RX ADMIN — METOCLOPRAMIDE 10 MG: 5 INJECTION, SOLUTION INTRAMUSCULAR; INTRAVENOUS at 05:10

## 2025-02-11 RX ADMIN — CHLORHEXIDINE GLUCONATE 0.12% ORAL RINSE 15 ML: 1.2 LIQUID ORAL at 08:36

## 2025-02-11 RX ADMIN — THIAMINE HYDROCHLORIDE: 100 INJECTION, SOLUTION INTRAMUSCULAR; INTRAVENOUS at 08:37

## 2025-02-11 RX ADMIN — PANTOPRAZOLE SODIUM 40 MG: 40 INJECTION, POWDER, FOR SOLUTION INTRAVENOUS at 20:00

## 2025-02-11 RX ADMIN — FENTANYL CITRATE 50 MCG: 50 INJECTION INTRAMUSCULAR; INTRAVENOUS at 05:10

## 2025-02-11 RX ADMIN — Medication 50 MCG/HR: at 13:20

## 2025-02-11 RX ADMIN — PROPOFOL 20 MCG/KG/MIN: 10 INJECTION, EMULSION INTRAVENOUS at 13:21

## 2025-02-11 RX ADMIN — METOCLOPRAMIDE 10 MG: 5 INJECTION, SOLUTION INTRAMUSCULAR; INTRAVENOUS at 14:14

## 2025-02-11 RX ADMIN — PANTOPRAZOLE SODIUM 40 MG: 40 INJECTION, POWDER, FOR SOLUTION INTRAVENOUS at 08:37
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
## 2025-02-11 NOTE — QUICK NOTE
GASTROENTEROLOGY QUICK NOTE:   Messaged by interventional radiology regarding patient having ongoing hemoptysis and hematemesis despite IR embolization.  Case was discussed and decision was made to pursue emergent EGD at MICU bedside.  Consent for EGD was obtained via telephone call while speaking to patient's wife Ananya Liz.  She is agreeable in pursuing an emergent EGD. Risks of procedure were discussed, all questions were answered to the best of my ability.    Addendum:  Spoke to the patient's wife post EGD and updated her regarding the procedure.     Cecile Humphrey MD  Gastroenterology Fellow  [facility]  Division of Gastroenterology and Hepatology

## 2025-02-11 NOTE — ASSESSMENT & PLAN NOTE
S/p 2/10 successful IR embo, after code crimson was called intra-procedure.  2/10 EGD: Multiple ulcers in body of stomach and D1 with adherent clot (Axel IIB), no active bleed. Abnormal esophagus mucosa.    Plan:  - serial abdominal exams  - NPO, IVF  - monitor OGT output, trend Hgb, transfuse prn  - appreciate GI recs  - wean vent as able

## 2025-02-11 NOTE — ASSESSMENT & PLAN NOTE
Documented increase use of alcohol in days/weeks prior to admission.  Unclear on chart review (do not have access to his records from [facility]) how much he was quantifiable he drinking.

## 2025-02-11 NOTE — WOUND OSTOMY CARE
Consult Note - Wound   Christian Liz 64 y.o. male MRN: [id number]  Unit/Bed#: MICU 07 Encounter: [id number]        History and Present Illness:  Patient is a 63 yo male that was admitted to [facility] for treatment of bleeding duodenal ulcer. Patient has a PMH of HTN, alcohol us. Patient is a max assist for turning and repositioning. Dependent for all care needs - intubated and sedated. Incontinent of bowel. Bladder is managed via internal urinary catheter.  On assessment, patient is seen lying on critical care low air loss mattress.     Wound Care was consulted for sacrum and RLE.      Assessment Findings:   B/L heels are dry intact and joy with no skin loss or wounds present. Recommend preventative foam dressings  and proper offloading/ repositioning.      Unable to assess sacro-buttocks at time of exam due to patient undergoing central line placement. Picture of sacro-buttocks placed below - area appears intact and hyperpigmented. Recommend preventative hydraguard to the area.     Right Pretibial Le areas of dry, brown sutured tissue pictured together. No skin loss or drainage noted. Recommend 3m no sting skin prep to areas.   Right Lateral Thigh Wound: irregular in shape area of full thickness skin loss. Wound bed with mix of brown and yellow slough tissue. Genesis-wound is fragile with scar tissue present. Scant serosanguineous drainage noted. Recommend application of silvasorb gel and foam dressing for the wound.     No induration, fluctuance, odor, warmth/temperature differences, redness, or purulence noted to the above noted wounds and skin areas assessed. New dressings applied per orders listed below. Patient tolerated well- no s/s of non-verbal pain or discomfort observed during the encounter. Bedside nurse aware of plan of care. See flow sheets for more detailed assessment findings.      Orders listed below and wound care will continue to follow, call or Secure Chat Message with questions.      Skin Care Plan:  1-Right Lateral Thigh Wound: Cleanse with NSS and pat dry. Apply Normlgel AG (silvasorb gel) to wound bed and cover with a silicone bordered foam dressing. Lasha with T for Treatment and change daily or PRN   2-Turn/reposition q2h or when medically stable for pressure re-distribution on skin. Utilize ATR turning and repositioning system   3-Elevate heels to offload pressure.  4-Moisturize skin daily with skin nourishing cream  5-Ehob cushion in chair when out of bed.  6-Preventative Hydraguard to bilateral sacro-buttocks BID and PRN.   7-Apply 3m no sting skin prep to right pretibial leg sutured scabs daily.   8-Cleanse B/L Heels with soap and water. Pat dry. Apply Silicone Border Foam (Mepilex) to areas. Lasha with P for Prevention and change every 3 days or PRN soilage/displacement. Peel back and inspect Q-shift.      Wounds:  Wound 02/07/25 Sacrum (Active)   Wound Image   02/10/25 2200   Wound 02/10/25 Thigh Anterior;Right (Active)   Wound Description Dry;Intact;Clean 02/11/25 0750   Genesis-wound Assessment Clean;Dry;Intact 02/11/25 0750   Wound Site Closure Other (Comment) 02/11/25 0400   Dressing Dry dressing;Gauze 02/11/25 0750   Dressing Status Clean;Dry;Intact 02/11/25 0750       Wound 02/10/25 Abrasion(s) Pretibial Right (Active)   Wound Image   02/11/25 1159   Wound Description Intact;Dry;Brown 02/11/25 1159   Genesis-wound Assessment Intact 02/11/25 1159   Wound Site Closure Sutures 02/11/25 1159   Drainage Amount None 02/11/25 1159   Dressing Other (Comment) 02/11/25 1159       Wound 02/10/25 Abrasion(s) Thigh Right (Active)   Wound Image   02/11/25 1159   Wound Description Brown;Black;Yellow 02/11/25 1159   Genesis-wound Assessment Fragile;Scar Tissue 02/11/25 1159   Wound Length (cm) 1.5 cm 02/11/25 1159   Wound Width (cm) 1 cm 02/11/25 1159   Wound Depth (cm) 0.2 cm 02/11/25 1159   Wound Surface Area (cm^2) 1.5 cm^2 02/11/25 1159   Wound Volume (cm^3) 0.3 cm^3 02/11/25 1159   Calculated  Wound Volume (cm^3) 0.3 cm^3 02/11/25 1159   Drainage Amount Scant 02/11/25 1159   Drainage Description Serosanguineous 02/11/25 1159   Non-staged Wound Description Full thickness 02/11/25 1159   Treatments Cleansed;Site care;Irrigation with NSS 02/11/25 1159   Dressing Silvasorb gel;Foam, Silicon (eg. Allevyn, etc) 02/11/25 1159   Dressing Changed New 02/11/25 1159   Patient Tolerance Tolerated well 02/11/25 1159   Dressing Status Clean;Intact;Dry 02/11/25 1159               Zo Juarez RN, BSN, CWOCN

## 2025-02-11 NOTE — PROGRESS NOTES
Pastoral Care Progress Note          Chaplaincy Interventions Utilized:   Empowerment: Clarified, confirmed, or reviewed information from treatment team     Exploration: Explored hope       Relationship Building: Listened empathically and Provided silent and supportive presence          Chaplaincy Outcomes Achieved:  Expressed peace        Spiritual Coping Strategies Utilized:   Spiritual comfort and Positive spiritual reframing       02/10/25 1900   Clinical Encounter Type   Visited With Patient not available   Crisis Visit Code  (Crimson)   Referral To    Patient Spiritual Encounters   Spiritual Encounter Notes The [facility] offered spiritual presence for the Pt and to the team.

## 2025-02-11 NOTE — QUICK NOTE
Spoke with wife Ananya and daughter Vannesa on the phone and provided an update. Family will be visiting later today and will answer any questions or concerns they have.  Odilia Fay,    Internal Medicine Residency PGY-1   [facility]

## 2025-02-11 NOTE — ASSESSMENT & PLAN NOTE
S/p 2/10 successful IR embo, after code crimson was called intra-procedure.    Plan:  - serial abdominal exams  - NPO, IVF  - monitor OGT output, trend Hgb, transfuse prn  - f/u GI EGD, appreciate GI recs  - wean vent as able

## 2025-02-11 NOTE — PLAN OF CARE
Problem: HEMATOLOGIC - ADULT  Goal: Maintains hematologic stability  Description: INTERVENTIONS  - Assess for signs and symptoms of bleeding or hemorrhage  - Monitor labs  - Administer supportive blood products/factors as ordered and appropriate  Outcome: Progressing     Problem: GASTROINTESTINAL - ADULT  Goal: Minimal or absence of nausea and/or vomiting  Description: INTERVENTIONS:  - Administer IV fluids if ordered to ensure adequate hydration  - Maintain NPO status until nausea and vomiting are resolved  - Nasogastric tube if ordered  - Administer ordered antiemetic medications as needed  - Provide nonpharmacologic comfort measures as appropriate  - Advance diet as tolerated, if ordered  - Consider nutrition services referral to assist patient with adequate nutrition and appropriate food choices  Outcome: Progressing     Problem: GASTROINTESTINAL - ADULT  Goal: Maintains or returns to baseline bowel function  Description: INTERVENTIONS:  - Assess bowel function  - Encourage oral fluids to ensure adequate hydration  - Administer IV fluids if ordered to ensure adequate hydration  - Administer ordered medications as needed  - Encourage mobilization and activity  - Consider nutritional services referral to assist patient with adequate nutrition and appropriate food choices  Outcome: Progressing     Problem: RESPIRATORY - ADULT  Goal: Achieves optimal ventilation and oxygenation  Description: INTERVENTIONS:  - Assess for changes in respiratory status  - Assess for changes in mentation and behavior  - Position to facilitate oxygenation and minimize respiratory effort  - Oxygen administered by appropriate delivery if ordered  - Initiate smoking cessation education as indicated  - Encourage broncho-pulmonary hygiene including cough, deep breathe, Incentive Spirometry  - Assess the need for suctioning and aspirate as needed  - Assess and instruct to report SOB or any respiratory difficulty  - Respiratory Therapy  support as indicated  Outcome: Progressing     Problem: Prexisting or High Potential for Compromised Skin Integrity  Goal: Skin integrity is maintained or improved  Description: INTERVENTIONS:  - Identify patients at risk for skin breakdown  - Assess and monitor skin integrity  - Assess and monitor nutrition and hydration status  - Monitor labs   - Assess for incontinence   - Turn and reposition patient  - Assist with mobility/ambulation  - Relieve pressure over bony prominences  - Avoid friction and shearing  - Provide appropriate hygiene as needed including keeping skin clean and dry  - Evaluate need for skin moisturizer/barrier cream  - Collaborate with interdisciplinary team   - Patient/family teaching  - Consider wound care consult   Outcome: Progressing

## 2025-02-11 NOTE — CASE MANAGEMENT
[facility] received request for transport authorization from Care Manager.   Date of transport:2/11  Type of transport:   Transport company: [employer]    NPI: [id number]  Start Location:Home  End Location:[facility] Necessity: pt had IV blood ,  Levaphed drip, tele , oxygen  transported  Authorization initiated by contacting insurance:[employer]   Via: Phone [phone]  Pending auth # [id number]  [phone]  Care Manager notified: Altagracia LU    Please reach out to  for updates on any clinical information.

## 2025-02-11 NOTE — DISCHARGE INSTRUCTIONS
Embolization   AMBULATORY CARE:   What you need to know about embolization: Embolization is a procedure to create a clot, or block, in a blood vessel. This stops blood from flowing to the area. The procedure may be used to treat many conditions. It can help stop heavy bleeding (hemorrhage), or prevent an aneurysm from rupturing. An abnormal connection between arteries can be removed. Embolization can stop blood flow to a tumor, such as a uterine fibroid or a cancer tumor. Chemotherapy medicine may be given during an embolization to treat a cancer tumor. This is called chemoembolization.  How to prepare for the procedure: Embolization is sometimes done as an emergency procedure. This means you will not have time to prepare. For an embolization that is not an emergency, the following are general guidelines for how to prepare:  Tell your provider about all your allergies. This includes if you have ever had an allergic reaction to contrast liquid, anesthesia, or antibiotics. You may be told not to eat or drink anything after midnight the night before your procedure. Arrange to have someone drive you home. The person should stay with you to help you and watch for problems that may develop.     Give your provider a list of your medicines. Include all medicines and supplements you take. You may need to stop taking blood thinners or aspirin several days before your procedure. This will help decrease your risk for bleeding. Do not stop taking medicines unless your healthcare provider tells you to stop. Your provider will tell you which medicines to take or not take on the day of your procedure.     You may need blood tests to check how well your blood clots and to check your kidney function. Depending on the reason for this procedure, you may an MRI, ultrasound, x-ray, or CT scan. These pictures will help your healthcare provider examine the area to be worked on.     If you are a woman, tell your provider if you know or  think you might be pregnant. You may not be able to have certain tests because they may harm an unborn baby. Your provider may need to take extra precautions for other tests.     What will happen during the procedure:   You may be given general anesthesia to keep you asleep and pain-free. You may instead be given moderate sedation. This means you will be awake during the procedure, but you should not feel any pain. Your provider will put numbing medicine on your skin where the procedure will be done. A small incision will be made over an artery. A catheter (thin tube) will be guided into the artery. Contrast liquid will be used to help your healthcare provider see your arteries more easily.     Your provider will use a type of x-ray that gives a moving picture of the arteries. This will help him or her move the catheter into the right place. The catheter is moved up until it reaches the correct artery. Your provider will put medicine or a material into the artery to slow or stop blood from flowing. This may be a coil, foam, beads, a plug, or liquid. The liquid may also contain material that is larger than blood cells.      Your provider will remove the catheter. Pressure will be used to stop any bleeding that happens. The incision area does not need to be closed with stitches. It will be small and close on its own. It will be covered with a bandage to keep it from becoming infected.     What to expect after the procedure:   You may have pain for a few days. Depending on the reason you had this procedure, you may also have a headache or cramps. You may have pain, bleeding, or bruising where the catheter went into your leg. All of these symptoms are normal and should get better soon. You may be given pain medicine through your IV or a pump. A pump allows you to control when the pain medicine is given.     You should expect to stay in the hospital at least overnight. If you had this procedure to treat heavy bleeding,  it may take 24 hours to know if the bleeding stopped.     Healthcare providers will help you walk around after your procedure. This will help prevent blood clots. Do not get up until healthcare providers say it is okay. They may want you to lie in one position for a certain amount of time. When they say it is okay to walk, they will help you stand and walk safely.     Risks of embolization: You may bleed more than expected or develop an infection. The area being treated may be damaged during the procedure. Your artery may be damaged from the catheter, or you may develop a blood clot. Your kidneys may be damaged from the contrast liquid. The material being put into the artery may go to the wrong place. This can stop blood flow to healthy tissue. The procedure may not work, or it may not relieve your symptoms.  Call your doctor or specialist if:   You have a fever higher than 100.4°F (38°C).     You have a fever, pain, and nausea that last longer than 3 days.     You suddenly have severe abdominal pain.     You cannot urinate, or you urinate very little.     You have signs of an infection at the catheter site, such as red streaks, pain, or swelling.     You have new or worsening pain.     You have questions or concerns about your condition or care.     Medicines: You may need any of the following:  NSAIDs help decrease swelling and pain or fever. This medicine is available with or without a doctor's order. NSAIDs can cause stomach bleeding or kidney problems in certain people. If you take blood thinner medicine, always ask your healthcare provider if NSAIDs are safe for you. Always read the medicine label and follow directions.     Prescription pain medicine may be given. Ask your healthcare provider how to take this medicine safely. Some prescription pain medicines contain acetaminophen. Do not take other medicines that contain acetaminophen without talking to your healthcare provider. Too much acetaminophen may  cause liver damage. Prescription pain medicine may cause constipation. Ask your healthcare provider how to prevent or treat constipation.      Take your medicine as directed. Contact your healthcare provider if you think your medicine is not helping or if you have side effects. Tell him or her if you are allergic to any medicine. Keep a list of the medicines, vitamins, and herbs you take. Include the amounts, and when and why you take them. Bring the list or the pill bottles to follow-up visits. Carry your medicine list with you in case of an emergency.     Self-care:   Rest as needed. Rest and sleep will help your body heal.     Follow your healthcare provider's instructions for activity. He or she will tell you when it is okay to return to your normal activities and to start driving. He or she may want you to wait 1 to 2 weeks to return to work.     Care for the catheter site as directed. It is okay to shower after the procedure. You will only have a small cut in your skin from where the catheter went into your leg. Check the catheter site for signs of infection, including red streaks, pain, and swelling.     Treat symptoms of postembolization syndrome. This syndrome is common after an embolization procedure. It usually starts within 72 hours of the procedure and may last a few days. The main symptoms are fever, pain, and nausea. You will probably be able to manage your symptoms at home. Acetaminophen or an NSAID, such as ibuprofen, can reduce a fever and pain. You may need to eat lightly to manage nausea. Drink more liquids for the first week after the procedure to prevent dehydration.   WOUND CARE     Remove band aid/ dressing tomorrow. You may shower 24 hours after your procedure. Shower and wash groin area or wrist area gently with soap and water: beginning tomorrow. Rinse and pat Dry. Apply new water seal band aid. Repeat this process for 5 days.  If there is any drainage from the puncture site, you should  put on a clean bandage. No Powders, creams, lotions or antibiotic ointments for 5 days.  No tub baths, hot tubs or swimming for 5 days.      Follow up with your doctor or specialist as directed: You may need to have more tests to check if the procedure worked. Write down your questions so you remember to ask them during your visits.  © Copyright [employer] 2020 Information is for End User's use only and may not be sold, redistributed or otherwise used for commercial purposes. All illustrations and images included in CareNotes® are the copyrighted property of [employer]. or [employer]  The above information is an  only. It is not intended as medical advice for individual conditions or treatments. Talk to your doctor, nurse or pharmacist before following any medical regimen to see if it is safe and effective for you.

## 2025-02-11 NOTE — RESPIRATORY THERAPY NOTE
RT Ventilator Management Note  Christian Liz 64 y.o. male MRN: [id number]  Unit/Bed#: MICU 07 Encounter: [id number]      Daily Screen         2/11/2025  0423 2/11/2025  0834          Patient safety screen outcome:: Failed Failed (P)       Not Ready for Weaning due to:: Underline problem not resolved Underline problem not resolved (P)                 Physical Exam:   Assessment Type: Assess only  General Appearance: Sedated  Respiratory Pattern: Assisted  Chest Assessment: Chest expansion symmetrical  Bilateral Breath Sounds: Diminished  Suction: ET Tube, Oral  O2 Device: G5      Resp Comments: PT received on scmv settings,no vent changes,alarms on and vent cleaned.will continue montior the pt.

## 2025-02-11 NOTE — OCCUPATIONAL THERAPY NOTE
OT CANCEL NOTE    OT orders received. Chart reviewed. Pt is currently intubated/sedated and not appropriate to engage in skilled OT services at this time. Will hold initial OT evaluation. Will continue to follow pt on caseload and see pt when medically stable and as clinically appropriate.     02/11/25 0748   OT Last Visit   OT Visit Date 02/11/25   Note Type   Note type Evaluation;Cancelled Session   Cancel Reasons Intubated/sedated         Ann Parker MS, OTR/L

## 2025-02-11 NOTE — PROCEDURES
Central Line    Date/Time: 2/11/2025 12:50 PM    Performed by: Elizabeth Cruz MD  Authorized by: Elizabeth Cruz MD    Patient location:  Bedside  Consent:     Consent obtained:  Written    Consent given by:  Spouse    Risks discussed:  Arterial puncture, incorrect placement, nerve damage and bleeding  Universal protocol:     Procedure explained and questions answered to patient or proxy's satisfaction: yes      Patient identity confirmed:  Hospital-assigned identification number  Pre-procedure details:     Hand hygiene: Hand hygiene performed prior to insertion      Sterile barrier technique: All elements of maximal sterile technique followed      Skin preparation:  2% chlorhexidine    Skin preparation agent: Skin preparation agent completely dried prior to procedure    Indications:     Central line indications: medications requiring central line    Anesthesia (see MAR for exact dosages):     Anesthesia method:  Local infiltration    Local anesthetic:  Lidocaine 1% w/o epi  Procedure details:     Location:  Left internal jugular    Vessel type: vein      Laterality:  Left    Approach: percutaneous technique used      Patient position:  Flat    Catheter type:  Triple lumen    Catheter size:  7 Fr    Landmarks identified: yes      Ultrasound guidance: yes      Ultrasound image availability:  Images available in PACS    Sterile ultrasound techniques: Sterile gel and sterile probe covers were used      Number of attempts:  1    Successful placement: yes      Catheter tip vessel location: superior vena cava    Post-procedure details:     Post-procedure:  Dressing applied and line sutured    Assessment:  Blood return through all ports, no pneumothorax on x-ray, placement verified by x-ray and free fluid flow    Patient tolerance of procedure:  Tolerated well, no immediate complications

## 2025-02-11 NOTE — RESPIRATORY THERAPY NOTE
RT Protocol Note  Christian Liz 64 y.o. male MRN: [id number]  Unit/Bed#: MICU 07 Encounter: [id number]    Assessment    Active Problems:    Bleeding duodenal ulcer      Home Pulmonary Medications:     02/10/25 2030   Respiratory Protocol   Protocol Initiated? Yes   Protocol Selection Respiratory   Language Barrier? Yes  (Pt. intubated)   Medical & Social History Reviewed? Yes   Diagnostic Studies Reviewed? Yes   Physical Assessment Performed? Yes   Respiratory Plan Vent/NIV/HFNC   Respiratory Assessment   Assessment Type Assess only   General Appearance Sedated   Respiratory Pattern Assisted   Chest Assessment Chest expansion symmetrical   Bilateral Breath Sounds Diminished   Suction ET Tube   Resp Comments Pt. presents from IR following repair of bleeding ulcer. Pt. has a pulm hx of pulmonary hypertension. Pt. does not take any pulm meds at home. RR 16, BBS diminished, SPO2 100% on CMV settings. CXR ordered, results pending. Will continue to follow per RCP.   O2 Device G5   Additional Assessments   Pulse 63   Respirations 16   SpO2 100 %            No past medical history on file.  Social History     Socioeconomic History    Marital status: /Civil Union     Spouse name: Not on file    Number of children: Not on file    Years of education: Not on file    Highest education level: Not on file   Occupational History    Not on file   Tobacco Use    Smoking status: Every Day     Current packs/day: 0.25     Types: Cigarettes    Smokeless tobacco: Never   Vaping Use    Vaping status: Never Used   Substance and Sexual Activity    Alcohol use: Not Currently     Comment: 2x weekly    Drug use: Never    Sexual activity: Not on file   Other Topics Concern    Not on file   Social History Narrative    Not on file     Social Drivers of Health     Financial Resource Strain: Not on file   Food Insecurity: Patient Declined (2/7/2025)    Nursing - Inadequate Food Risk Classification     Worried About Running Out of Food in  "the Last Year: Not on file     Ran Out of Food in the Last Year: Not on file     Ran Out of Food in the Last Year: Patient declined   Transportation Needs: Patient Declined (2025)    Nursing - Transportation Risk Classification     Lack of Transportation: Not on file     Lack of Transportation: Patient declined   Physical Activity: Not on file   Stress: Not on file   Social Connections: Unknown (2024)    Received from [facility]    Social Connections     How often do you feel lonely or isolated from those around you? (Adult - for ages 18 years and over): Not on file   Intimate Partner Violence: Unknown (2025)    Nursing IPS     Feels Physically and Emotionally Safe: Not on file     Physically Hurt by Someone: Not on file     Humiliated or Emotionally Abused by Someone: Not on file     Physically Hurt by Someone: No     Hurt or Threatened by Someone: No   Housing Stability: Unknown (2025)    Nursing: Inadequate Housing Risk Classification     Has Housing: Not on file     Worried About Losing Housing: Not on file     Unable to Get Utilities: Not on file     Unable to Pay for Housing in the Last Year: No     Has Housin       Subjective         Objective    Physical Exam:   Assessment Type: Assess only  General Appearance: Sedated  Respiratory Pattern: Assisted  Chest Assessment: Chest expansion symmetrical  Bilateral Breath Sounds: Diminished  Suction: ET Tube  O2 Device: G5    Vitals:  Blood pressure (!) 82/60, pulse 70, temperature 99.1 °F (37.3 °C), temperature source Bladder, resp. rate 17, height 5' 9\" (1.753 m), weight 71.6 kg (157 lb 13.6 oz), SpO2 98%.    Results from last 7 days   Lab Units 02/10/25  1516   PH ART  7.425   PCO2 ART mm Hg 38.8   PO2 ART mm Hg 106.5   HCO3 ART mmol/L 24.9   BASE EXC ART mmol/L 0.5   O2 CONTENT ART mL/dL 8.7*   O2 HGB, ARTERIAL % 95.8   ABG SOURCE  Line, Arterial       Imaging and other studies: Results Review Statement: I reviewed radiology reports from this " admission including: chest xray.    O2 Device: G5     Plan    Respiratory Plan: Vent/NIV/HFNC        Resp Comments: Pt. remains on documented CMV settings, tolerating well.

## 2025-02-11 NOTE — PROGRESS NOTES
"Progress Note - Surgery-General   Name: Christian Liz 64 y.o. male I MRN: [id number]  Unit/Bed#: MICU 07 I Date of Admission: 2/10/2025   Date of Service: 2/11/2025 I Hospital Day: 1     Assessment & Plan  Bleeding duodenal ulcer  S/p 2/10 successful IR embo, after code crimson was called intra-procedure.  2/10 EGD: Multiple ulcers in body of stomach and D1 with adherent clot (Axel IIB), no active bleed. Abnormal esophagus mucosa.    Plan:  - serial abdominal exams  - NPO, IVF  - monitor OGT output, trend Hgb, transfuse prn  - appreciate GI recs  - wean vent as able    Subjective/Objective   NAOE. Intubated/sedated.    Physical Exam:  General: NAD. On fent 50, prop 20, levo 3  CV: nl rate, [place], CVC  Lungs: nl wob. No resp distress. On SCMV 16/400/10/40%  ABD: Soft, minimal distention, NT. OGT with 50cc bloody output  Extrem: No CCE  Fountain with 620cc clear yellow UOP    Patient Vitals for the past 24 hrs:   BP Temp Temp src Pulse Resp SpO2 Height Weight   02/11/25 0600 -- 99 °F (37.2 °C) Bladder 70 14 97 % -- --   02/11/25 0536 -- -- -- -- -- -- -- 71.6 kg (157 lb 13.6 oz)   02/11/25 0530 -- 99 °F (37.2 °C) -- 71 17 96 % -- --   02/11/25 0515 -- 99 °F (37.2 °C) -- 79 16 96 % -- --   02/11/25 0500 -- 99.1 °F (37.3 °C) -- 75 16 98 % -- --   02/11/25 0435 -- 99.1 °F (37.3 °C) -- 80 17 98 % -- --   02/11/25 0400 -- 99.1 °F (37.3 °C) Bladder 70 17 98 % -- --   02/11/25 0300 -- 99.1 °F (37.3 °C) -- 70 16 98 % -- --   02/11/25 0200 (!) 82/60 98.2 °F (36.8 °C) Bladder 66 16 98 % -- --   02/11/25 0130 (!) 82/61 97.7 °F (36.5 °C) Bladder 64 16 99 % -- --   02/11/25 0100 (!) 81/60 (!) 97.2 °F (36.2 °C) -- 58 16 99 % -- --   02/11/25 0030 (!) 85/58 (!) 96.6 °F (35.9 °C) -- (!) 54 16 99 % -- --   02/11/25 0000 (!) 88/61 (!) 96.1 °F (35.6 °C) Bladder (!) 47 16 98 % -- --   02/10/25 2337 (!) 89/64 (!) 95.7 °F (35.4 °C) Bladder (!) 48 16 -- 5' 9\" (1.753 m) 71.6 kg (157 lb 13.6 oz)   02/10/25 2300 (!) 89/64 (!) 95.7 °F " (35.4 °C) Bladder (!) 48 16 97 % -- --   02/10/25 2245 -- (!) 95.7 °F (35.4 °C) -- (!) 54 16 98 % -- --   02/10/25 2230 91/59 (!) 95.7 °F (35.4 °C) -- (!) 54 14 100 % -- --   02/10/25 2200 -- (!) 95 °F (35 °C) Bladder 64 17 100 % -- --   02/10/25 2045 -- (!) 95.4 °F (35.2 °C) -- 62 14 100 % -- --   02/10/25 2030 -- (!) 95.7 °F (35.4 °C) -- 63 16 100 % -- --   02/10/25 1800 -- -- -- 97 13 -- -- --   02/10/25 1755 -- -- -- 93 16 -- -- --   02/10/25 1750 -- -- -- 93 15 (!) 83 % -- --   02/10/25 1745 -- -- -- 94 18 (!) 84 % -- --   02/10/25 1740 -- -- -- 99 20 (!) 87 % -- --   02/10/25 1735 -- (!) 97.4 °F (36.3 °C) Axillary 101 20 -- -- --   02/10/25 1730 -- -- -- 89 (!) 24 (!) 86 % -- --       I/O         02/09 0701  02/10 0700 02/10 0701 02/11 0700    I.V.  2000    Blood  5475    NG/     IV Piggyback  350    Total Intake 190 7825    Urine  350    Emesis/NG output 900 700    Blood  1000    Total Output 900 2050    Net -710 +[phone]                  Recent Labs     02/10/25  1515 02/10/25  1729 02/10/25  1817 02/10/25  2022 02/10/25  2025 02/10/25  2357 02/11/25  0440   WBC 15.04* 12.22*  --  10.70*  --  9.89 9.36   HGB 5.9* 6.7*   < > 11.8* 10.9* 12.4 12.8   *  --   --  82*  --  66* 74*   SODIUM 137 140  --  139  --   --  142   K 3.7 4.2  --  3.9  --   --  4.0    111*  --  108  --   --  108   CO2 27 23   < > 27 29  --  30   BUN 24 23  --  21  --   --  24   CREATININE 0.40* 0.46*  --  0.47*  --   --  0.62   GLUC 144* 141*  --  153*  --   --  92   CALCIUM 6.9* 7.0*  --  9.7  --   --  8.9   MG  --   --   --  1.8*  --   --  2.2   PHOS  --   --   --  4.5*  --   --  3.9   AST 16 10*  --  16  --   --  17   ALT 12 9  --  14  --   --  15   ALKPHOS 32* 26*  --  48  --   --  56   TBILI 0.78 1.02*  --  4.27*  --   --  2.59*   EGFR 125 118  --  117  --   --  104   INR 1.32* 1.63*  --   --   --   --   --    LACTICACID 0.7  --   --  1.1  --   --   --     < > = values in this interval not displayed.     Lab Results    Component Value Date    BLOODCX No Growth at 72 hrs. 02/07/2025    BLOODCX No Growth at 72 hrs. 02/07/2025    LEUKOCYTESUR Negative 02/07/2025

## 2025-02-11 NOTE — RESPIRATORY THERAPY NOTE
02/11/25 0423   Respiratory Assessment   Assessment Type Assess only   General Appearance Sedated   Respiratory Pattern Assisted   Chest Assessment Chest expansion symmetrical   Bilateral Breath Sounds Diminished   Suction ET Tube   Resp Comments Pt. remains on documented CMV settings, tolerating well.   O2 Device G5   Vent Information   Vent ID [id number]   Vent type Arriaga G5   Arriaga Vent Mode (S)CMV   $ Vent Daily Charge-Subsequent Yes   $ Pulse Oximetry Spot Check Charge Completed   (S)CMV Settings   Resp Rate (BPM) 16 BPM   VT (mL) 400 mL   FIO2 (%) 50 %   PEEP (cmH2O) 10 cmH2O   I:E Ratio 1/2.8   Insp Time (%) 1 %   Flow Trigger (LPM) 5   Humidification Heater   Heater Temperature (Set) 95 °F (35 °C)   (S)CMV Actuals   Resp Rate (BPM) 16 BPM   VT (mL) 403   MV 6.5   MAP (cmH2O) 12 cmH2O   Peak Pressure (cmH2O) 19 cmH2O   I:E Ratio (Obs) 1/2.8   Insp Resistance 10   Heater Temperature (Obs) 95 °F (35 °C)   Static Compliance (mL/cmH20) 69.7 mL/cmH2O   (S)CMV Alarms   High Peak Pressure (cmH2O) 40   Low Pressure (cmH2O) 5 cm H2O   High Resp Rate (BPM) 40 BPM   Low Resp Rate (BPM) 8 BPM   High MV (L/min) 20 L/min   Low MV (L/min) 4 L/min   High VT (mL) 1000 mL   Low VT (mL) 250 mL   Apnea Time (s) 20 S   Maintenance   Alarm (pink) cable attached No   Resuscitation bag with peep valve at bedside Yes   Water bag changed No   Circuit changed No   Daily Screen   Patient safety screen outcome: Failed   Not Ready for Weaning due to: Underline problem not resolved   ETT  Hi-Lo;Cuffed;Oral;Inflated 8 mm   Placement Date/Time: 02/10/25 5840   Mask Ventilation: Mask ventilation not attempted (0)  Preoxygenated: Yes  Technique: Rapid sequence  Type: Hi-Lo;Cuffed;Oral;Inflated  Tube Size: 8 mm  Laryngoscope: Mac  Blade Size: 3  Location: Oral  Grade View: ...   Secured at (cm) 23   Measured from Lips   Secured Location Right   Secured by Commercial tube alvares   Site Condition Dry   Cuff Pressure (cm H2O)   (MLT)   HI-LO  Suction  Intermittent suction   HI-LO Secretions Scant   HI-LO Intervention Patent     RT Ventilator Management Note  Christian Liz 64 y.o. male MRN: [id number]  [facility]: [id number]      Daily Screen         2/11/2025  0003 2/11/2025  0423          Patient safety screen outcome:: Failed Failed (P)       Not Ready for Weaning due to:: Underline problem not resolved Underline problem not resolved (P)                 Physical Exam:   Assessment Type: (P) Assess only  General Appearance: (P) Sedated  Respiratory Pattern: (P) Assisted  Chest Assessment: (P) Chest expansion symmetrical  Bilateral Breath Sounds: (P) Diminished  Suction: (P) ET Tube  O2 Device: (P) G5      Resp Comments: (P) Pt. remains on documented CMV settings, tolerating well.

## 2025-02-11 NOTE — SEDATION DOCUMENTATION
Upper GI embolization performed by Dr. Coy. Anesthesia present throughout. Right groin accessed, closed with Vascade. Report given to MICU RN
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
## 2025-02-11 NOTE — PHYSICAL THERAPY NOTE
Physical Therapy Cancellation Note    PT orders received chart review completed. Pt is currently intubated/sedated and not appropriate to participate in skilled PT at this time. PT will follow and eval as medically appropriate.     02/11/25 1300   Note Type   Note type Cancelled Session;Evaluation   Cancel Reasons Intubated/sedated       Stephanie Cardenas, PT

## 2025-02-11 NOTE — UTILIZATION REVIEW
NOTIFICATION OF ADMISSION DISCHARGE   This is a Notification of Discharge from [facility]. Please be advised that this patient has been discharge from our facility. Below you will find the admission and discharge date and time including the patient’s disposition.   UTILIZATION REVIEW CONTACT:  Ana Colindres  Utilization   Network Utilization Review Department  Phone: [phone] x carefully listen to the prompts. All voicemails are confidential.  Email: [email or web address]     ADMISSION INFORMATION  PRESENTATION DATE: 2/7/2025  5:44 PM  OBERVATION ADMISSION DATE: N/A  INPATIENT ADMISSION DATE: 2/7/25  7:35 PM   DISCHARGE DATE: 2/10/2025  5:13 PM   DISPOSITION:[facility] Utilization Review Department  ATTENTION: Please call with any questions or concerns to [phone] and carefully listen to the prompts so that you are directed to the right person. All voicemails are confidential.   For Discharge needs, contact Care Management DC Support Team at [phone] opt. 2  Send all requests for admission clinical reviews, approved or denied determinations and any other requests to dedicated fax number below belonging to the campus where the patient is receiving treatment. List of dedicated fax numbers for the Facilities:  FACILITY NAME UR FAX NUMBER   ADMISSION DENIALS (Administrative/Medical Necessity) [phone]   DISCHARGE SUPPORT TEAM ([facility]) [phone]   PARENT CHILD HEALTH (Maternity/NICU/Pediatrics) [phone]   [facility] [phone]   [facility] [phone]   Replaced by [facility] [phone]   [facility] [phone]   [facility] [phone]   [facility] [phone]   [facility] [phone]   [facility]  [phone]   [facility] [phone]   [facility] [phone]   [facility] [phone]   [facility] [phone]

## 2025-02-11 NOTE — DISCHARGE INSTR - OTHER ORDERS
Skin Care Plan:  1-Right Lateral Thigh Wound: Cleanse with NSS and pat dry. Apply Normlgel AG (silvasorb gel) to wound bed and cover with a silicone bordered foam dressing. Lasha with T for Treatment and change daily or PRN   2-Turn/reposition q2h or when medically stable for pressure re-distribution on skin. Utilize ATR turning and repositioning system   3-Elevate heels to offload pressure.  4-Moisturize skin daily with skin nourishing cream  5-Ehob cushion in chair when out of bed.  6-Preventative Hydraguard to bilateral sacro-buttocks BID and PRN.   7-Apply 3m no sting skin prep to right pretibial leg sutured scabs daily.   8-Cleanse B/L Heels with soap and water. Pat dry. Apply Silicone Border Foam (Mepilex) to areas. Lasha with P for Prevention and change every 3 days or PRN soilage/displacement. Peel back and inspect Q-shift.  9-Posterior Scalp Scab: apply 3m no sting skin prep to the scab daily.

## 2025-02-11 NOTE — BRIEF OP NOTE (RAD/CATH)
INTERVENTIONAL RADIOLOGY PROCEDURE NOTE    Date: 2/10/2025    Procedure: IR EMBOLIZATION (SPECIFY VESSEL OR SITE)     Preoperative diagnosis:   1. Acute blood loss anemia    2. Hemoptysis    3. Melena    4. Bleeding duodenal ulcer         Postoperative diagnosis: Same.    Surgeon: Sonia Coy MD     Assistant: None. No qualified resident was available.    Blood loss: 10 mL    Specimens: None    Findings:    Large defect in the gastroduodenal artery with active extravasation.  Successful coil embolization with cessation of bleeding.    Right groin closed with Vascade.    Pt has left inguinal hernia.    Complications: None immediate.    Anesthesia: general anesthesia

## 2025-02-11 NOTE — ANESTHESIA POSTPROCEDURE EVALUATION
Post-Op Assessment Note    CV Status:  Stable  Pain scale: Intubated. Sedated. Unable to assess.    Pain management: adequate       Post-procedure mental status: Intubated. Sedated. Unable to assess.   Hydration Status:  Hypovolemic   PONV status: Intubated. Sedated. Unable to assess.   Airway patency: Intubated. Sedated. Unable to assess.  Airway: intubated     Post Op Vitals Reviewed: Yes    No anethesia notable event occurred.    Staff: CRNA       Last Filed PACU Vitals:  Vitals Value Taken Time   Temp 95.54 °F (35.3 °C) 02/10/25 2027   Pulse 69 02/10/25 2027   BP     Resp 15 02/10/25 2027   SpO2 100 % 02/10/25 2027   Vitals shown include unfiled device data.

## 2025-02-11 NOTE — PROGRESS NOTES
Responded to referral for follow up visit. No family present at this time. Medical staff providing care for patient.  remains available as needed.   02/11/25 1000   Clinical Encounter Type   Visited With Patient not available;Health care provider   Routine Visit Follow-up   Crisis Visit Critical Care   Referral From

## 2025-02-11 NOTE — ASSESSMENT & PLAN NOTE
Presents to [facility] as a transfer from [facility] following recurrent episodes of hemoptysis, BRBPR, melena despite multiple EGDs requiring stat IR angiogram and embolization and multiple transfusions.  Appears that source of bleeding were multiple duodenal ulcers, required IR coil embolization of GDA.  Today, while he remains intubated, hemoglobin has stabilized, no longer requires vasopressors (though BP on the lower side; MAP still >65).  50 mL output through OG tube, dark, old blood in appearance.  Otherwise no obvious source of bleeding at this time.    Plan  -Tentatively no plan for repeat EGD tomorrow   -Continue IV Protonix, 40 mg BID  -No need for Reglan 2/2 no plan for EGD  -Maintain two large bore PIV  -Continue to trend CBC, will leave intervals to MICU team  -Maintain Hb >7  -Monitor for BRBPR, dark stools, OGT output concerning for continued bleeding  -Consider starting DVT prophylaxis, nutrition as soon as clinically feasible

## 2025-02-11 NOTE — PROGRESS NOTES
I have personally seen and examined patient and reviewed all data with resident. Agree with note, assessment and plan. Critical care time 64 minutes. Please refer to attending comments below. Critical care time does not include procedures, family meeting or teaching.      Date of service 2/10/2025 8:16 PM  Patient was originally seen in interventional radiology and then reevaluated multiple times while in the ICU.  Critical care was present for endoscopy with GI.    Patient was transferred from [facility] due to GIB with active bleeding uncontrolled with clip to duodenal ulcer. Patient was taken to IR for angio and embolization. Code crimson called. Patient received 1 whole blood, 9 RBC, 10 FFP, 4 platelet and 1 cryo and 1 g TXA while in IR. Prior to transfer 13 RBC, 5 FFP, 1 platelet and 1 cryp as well as 1 gram TXA. Post intervention patient had BRB per OGT. Cold saline lavage was performed by surgery which appeared to slow down and clear OGT output. Clots were also observed in OGT. Surgery was contact while the patient was in IR. GI also contacted with plan for post IR endoscopy. Reglan 10mg x 1 administered for planned EGD.     EGD at [facility] s/p clip x 2 with EGD. Patient had     Visit Vitals  Pulse 97   Temp (!) 97.4 °F (36.3 °C) (Axillary)   Resp 13   SpO2 (!) 83%   Smoking Status Every Day     GEN: sedated on ventilator  HEENT: ETT on OGT in place  CV: Bradycardia, regular, sinus  Resp:  CTA, no R/R/W  GI: firm, not distended  : urinary catheter in place  Neuro: GCS 3 T  Skin: Cool, dry    All imaging and labs reviewed    Hemorrhagic shock due to GIB with massive transfusion  GIB due to active bleeding from duodenal ulcer  Acute blood loss anemia  Acute hypoxic respiratory failure   Right lower lobe lung consolidation  ETOH use disorder  Hypothermic   Bradycardia-multifactorial due to medications as well as hypothermia  Tobacco abuse/nicotine dependence  Protein calorie malnutrition-moderate  Hypomagnesemia-replete  and recheck  Hypokalemia-replete and recheck    Patient was a code Crimson secondary to massive GI bleed.  He is overall resuscitation blood product replacement as stated above.  Status post interventional radiology embolization of GDA patient had EGD which identified large clot in the stomach as well as multiple healed ulcers and duodenal ulcer which did not appear to be actively bleeding.  Clip was identified from prior EGD.    OG tube placed by GI during endoscopy.  Monitor output on a low intermittent wall suction.  Continue with PPI twice daily as per GI recommendations.  Plan for follow-up endoscopy on 2/12/2025.  Monitor patient's hemoglobin as well as coagulation parameters.  Patient does not appear to have any ongoing bleeding at this time.  If patient has decline in his hemoglobin or significant output from his OG tube or melanotic stool we will consider surgical intervention versus repeat endoscopy.  General surgery consulted and aware of patient.    Maintain on ventilator.  Patient is not appropriate for spontaneous breathing trial.  Maintain saturations greater than 90%.  Aggressive airway clearance protocol.    Patient has bradycardia as well as labile hemodynamic status.  Norepinephrine as needed to maintain mean arterial pressure greater than 65.  Check troponin.  Check point-of-care ultrasound.  Check TSH with patient's bradycardia.  Attempt to rewarm.  Patient's temperature had been as low as 95 degrees.  Goal normothermia.  EKG reviewed patient appears to have sinus bradycardia.    Continue with ceftriaxone.  Patient has a right lower lobe consolidation.  He would benefit from bronchoscopy when he proves to be stable from a hemodynamic ambulatory aspect.    Patient has a history of regular alcohol use.  Monitor for signs of withdrawal.  Initiate thiamine and folate supplementation.    Update: Repeat vital signs with patient improving in his temperature.  His hemodynamic status remains labile.  He  "is not having any active bleeding.  Continue with norepinephrine infusion if patient's mean arterial pressures do not improve.    Visit Vitals  BP (!) 85/58   Pulse (!) 54   Temp (!) 96.6 °F (35.9 °C)   Resp 16   Ht 5' 9\" (1.753 m)   Wt 71.6 kg (157 lb 13.6 oz)   SpO2 99%   BMI 23.31 kg/m²   Smoking Status Every Day   BSA 1.87 m²       "

## 2025-02-12 ENCOUNTER — APPOINTMENT (INPATIENT)
Dept: RADIOLOGY | Facility: HOSPITAL | Age: 65
DRG: 853 | End: 2025-02-12
Payer: COMMERCIAL

## 2025-02-12 PROBLEM — J18.9 PNEUMONIA: Status: ACTIVE | Noted: 2025-02-12

## 2025-02-12 PROBLEM — J18.1 RIGHT LOWER LOBE CONSOLIDATION (HCC): Status: ACTIVE | Noted: 2025-02-12

## 2025-02-12 PROBLEM — K92.1 HEMATOCHEZIA: Status: RESOLVED | Noted: 2025-02-08 | Resolved: 2025-02-12

## 2025-02-12 PROBLEM — R04.2 HEMOPTYSIS: Status: RESOLVED | Noted: 2025-02-08 | Resolved: 2025-02-12

## 2025-02-12 LAB
ANION GAP SERPL CALCULATED.3IONS-SCNC: 6 MMOL/L (ref 4–13)
BASOPHILS # BLD AUTO: 0.07 THOUSANDS/ΜL (ref 0–0.1)
BASOPHILS NFR BLD AUTO: 1 % (ref 0–1)
BUN SERPL-MCNC: 24 MG/DL (ref 5–25)
CALCIUM SERPL-MCNC: 8.2 MG/DL (ref 8.4–10.2)
CHLORIDE SERPL-SCNC: 108 MMOL/L (ref 96–108)
CO2 SERPL-SCNC: 29 MMOL/L (ref 21–32)
CREAT SERPL-MCNC: 0.48 MG/DL (ref 0.6–1.3)
EOSINOPHIL # BLD AUTO: 0.15 THOUSAND/ΜL (ref 0–0.61)
EOSINOPHIL NFR BLD AUTO: 1 % (ref 0–6)
ERYTHROCYTE [DISTWIDTH] IN BLOOD BY AUTOMATED COUNT: 17.9 % (ref 11.6–15.1)
GFR SERPL CREATININE-BSD FRML MDRD: 116 ML/MIN/1.73SQ M
GLUCOSE SERPL-MCNC: 83 MG/DL (ref 65–140)
HCT VFR BLD AUTO: 36.6 % (ref 36.5–49.3)
HCT VFR BLD AUTO: 38.8 % (ref 36.5–49.3)
HGB BLD-MCNC: 12.6 G/DL (ref 12–17)
HGB BLD-MCNC: 13.3 G/DL (ref 12–17)
IMM GRANULOCYTES # BLD AUTO: 0.19 THOUSAND/UL (ref 0–0.2)
IMM GRANULOCYTES NFR BLD AUTO: 1 % (ref 0–2)
LYMPHOCYTES # BLD AUTO: 0.79 THOUSANDS/ΜL (ref 0.6–4.47)
LYMPHOCYTES NFR BLD AUTO: 5 % (ref 14–44)
MAGNESIUM SERPL-MCNC: 1.8 MG/DL (ref 1.9–2.7)
MCH RBC QN AUTO: 29.9 PG (ref 26.8–34.3)
MCHC RBC AUTO-ENTMCNC: 34.4 G/DL (ref 31.4–37.4)
MCV RBC AUTO: 87 FL (ref 82–98)
MONOCYTES # BLD AUTO: 1.22 THOUSAND/ΜL (ref 0.17–1.22)
MONOCYTES NFR BLD AUTO: 8 % (ref 4–12)
MRSA NOSE QL CULT: NORMAL
NEUTROPHILS # BLD AUTO: 12.5 THOUSANDS/ΜL (ref 1.85–7.62)
NEUTS SEG NFR BLD AUTO: 84 % (ref 43–75)
NRBC BLD AUTO-RTO: 0 /100 WBCS
PHOSPHATE SERPL-MCNC: 3.1 MG/DL (ref 2.3–4.1)
PLATELET # BLD AUTO: 125 THOUSANDS/UL (ref 149–390)
PMV BLD AUTO: 9.9 FL (ref 8.9–12.7)
POTASSIUM SERPL-SCNC: 3.6 MMOL/L (ref 3.5–5.3)
RBC # BLD AUTO: 4.21 MILLION/UL (ref 3.88–5.62)
SODIUM SERPL-SCNC: 143 MMOL/L (ref 135–147)
WBC # BLD AUTO: 14.92 THOUSAND/UL (ref 4.31–10.16)

## 2025-02-12 PROCEDURE — 85018 HEMOGLOBIN: CPT

## 2025-02-12 PROCEDURE — 97535 SELF CARE MNGMENT TRAINING: CPT

## 2025-02-12 PROCEDURE — 84100 ASSAY OF PHOSPHORUS: CPT

## 2025-02-12 PROCEDURE — 99233 SBSQ HOSP IP/OBS HIGH 50: CPT | Performed by: SURGERY

## 2025-02-12 PROCEDURE — 99233 SBSQ HOSP IP/OBS HIGH 50: CPT | Performed by: INTERNAL MEDICINE

## 2025-02-12 PROCEDURE — 94760 N-INVAS EAR/PLS OXIMETRY 1: CPT

## 2025-02-12 PROCEDURE — 80048 BASIC METABOLIC PNL TOTAL CA: CPT

## 2025-02-12 PROCEDURE — 97167 OT EVAL HIGH COMPLEX 60 MIN: CPT

## 2025-02-12 PROCEDURE — 97163 PT EVAL HIGH COMPLEX 45 MIN: CPT

## 2025-02-12 PROCEDURE — 85025 COMPLETE CBC W/AUTO DIFF WBC: CPT

## 2025-02-12 PROCEDURE — 97110 THERAPEUTIC EXERCISES: CPT

## 2025-02-12 PROCEDURE — 83735 ASSAY OF MAGNESIUM: CPT

## 2025-02-12 PROCEDURE — 94003 VENT MGMT INPAT SUBQ DAY: CPT

## 2025-02-12 PROCEDURE — NC001 PR NO CHARGE: Performed by: EMERGENCY MEDICINE

## 2025-02-12 PROCEDURE — 99291 CRITICAL CARE FIRST HOUR: CPT | Performed by: INTERNAL MEDICINE

## 2025-02-12 PROCEDURE — 74178 CT ABD&PLV WO CNTR FLWD CNTR: CPT

## 2025-02-12 PROCEDURE — NC001 PR NO CHARGE: Performed by: INTERNAL MEDICINE

## 2025-02-12 PROCEDURE — 85014 HEMATOCRIT: CPT

## 2025-02-12 RX ORDER — MAGNESIUM SULFATE HEPTAHYDRATE 40 MG/ML
2 INJECTION, SOLUTION INTRAVENOUS ONCE
Status: COMPLETED | OUTPATIENT
Start: 2025-02-12 | End: 2025-02-12

## 2025-02-12 RX ADMIN — THIAMINE HYDROCHLORIDE: 100 INJECTION, SOLUTION INTRAMUSCULAR; INTRAVENOUS at 08:30

## 2025-02-12 RX ADMIN — PANTOPRAZOLE SODIUM 40 MG: 40 INJECTION, POWDER, FOR SOLUTION INTRAVENOUS at 20:47

## 2025-02-12 RX ADMIN — IOHEXOL 90 ML: 350 INJECTION, SOLUTION INTRAVENOUS at 04:27

## 2025-02-12 RX ADMIN — CHLORHEXIDINE GLUCONATE 0.12% ORAL RINSE 15 ML: 1.2 LIQUID ORAL at 08:35

## 2025-02-12 RX ADMIN — CHLORHEXIDINE GLUCONATE 0.12% ORAL RINSE 15 ML: 1.2 LIQUID ORAL at 20:48

## 2025-02-12 RX ADMIN — PANTOPRAZOLE SODIUM 40 MG: 40 INJECTION, POWDER, FOR SOLUTION INTRAVENOUS at 08:32

## 2025-02-12 RX ADMIN — MAGNESIUM SULFATE HEPTAHYDRATE 2 G: 40 INJECTION, SOLUTION INTRAVENOUS at 08:27

## 2025-02-12 NOTE — ASSESSMENT & PLAN NOTE
"CTAP 2/7:\"Focal grouping of nodularity in the left lower lobe consistent with an infectious/inflammatory infiltrate.Heterogeneous density within the stomach and duodenum may represent hemorrhage considering clinical history. No active arterial extravasation or focal wall abnormality identified. No free air or adjacent fluid collection. Multifocal right greater than left anterior subacute/healing rib fractures. Healing subacute manubrial fracture.\"    COVID, flu, RSV negative.  Leigonella and strep pneumo antigen negative.  H pylori stool antigen negative.  Procal 0.55 on 2/11.     Patient with RLL consolidation thought to be pneumonia and pt treated with 5 days of Ceftriaxone at [facility]. Other differentials include abscess as seen on CT chest imaging as well as possible hematoma from recent rib fracture and pulmonary laceration.    Plan:  Ceftriaxone completed, continue to monitor for signs of infection  Respiratory protocol, continuous pulse oximetry.   Incentive spirometry. Pulmonary toilet. Maintain O2 sat >90%.  Supplemental O2: 6L NC during prior hospitalization. Wean as tolerated.    "
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
## 2025-02-12 NOTE — RESPIRATORY THERAPY NOTE
RT Ventilator Management Note  Christian Liz 64 y.o. male MRN: [id number]  Unit/Bed#: MICU 07 Encounter: [id number]      Daily Screen         2/11/2025  0834 2/12/2025  0819          Patient safety screen outcome:: Failed Passed      Not Ready for Weaning due to:: Underline problem not resolved --      Spont breathing trial % for 30 min: -- Yes      Spont breathing trial outcome:: -- Passed                Physical Exam:   Assessment Type: Assess only  General Appearance: Awake  Respiratory Pattern: Assisted  Chest Assessment: Chest expansion symmetrical  Bilateral Breath Sounds: Diminished, Clear  Cough: Productive  Suction: (P) ET Tube  O2 Device: Ventilator      Resp Comments: Pt alert. Pt placed on Spont mode. BS decreased clear. Will contine to monitor pt.

## 2025-02-12 NOTE — ASSESSMENT & PLAN NOTE
Patient with hemorrhagic shock requiring levophed upon transfer from [facility] to [facility]. S/p fluid and blood resuscitation. Now resolved.
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
## 2025-02-12 NOTE — PROGRESS NOTES
Progress Note - Critical Care/ICU   Name: Christian Liz 64 y.o. male I MRN: [id number]  Unit/Bed#: MICU 07 I Date of Admission: 2/10/2025   Date of Service: 2/12/2025 I Hospital Day: 2       Critical Care Interval Transfer Note:    Brief Hospital Summary: This is a 65yo male with a pmhx of HTN, alcohol use who presented to the ED at [facility] with hemoptysis (x1 episode), hematemesis, and melanotic diarrhea x6 on 2/7. In ED, pt was hypotensive, hypothermic and tachycardic. FOBT positive. Imaging concerning for R PNA with possible mass vs empyema vs pulmonary laceration with surrounding hemorrhage. Trauma at [facility] was contacted with no further recs. GI consulted, started on PPI infusion and underwent EGD x3 for concern of acute GIB which showed duodenal ulcer with evidence of bleeding refractory to clips, cauterization, and epi. Patient transferred to [facility] for IR intervention given refractory bleeding. On arrival to [facility], he went to IR for embolization of gda. Code crimson called and patient is s/p 10+ pRBC transfusion. Patient intubated 2/11 after IR embolization and successfully extubated 2/12 AM to NC. Tolerating diet, H/H stabilizing, and patient now stable for transfer to SD2 level of care.     Barriers to discharge:   PT/OT  GI recommendations     Consults: INPATIENT CONSULT TO IR  IP CONSULT TO GASTROENTEROLOGY  IP CONSULT TO ACUTE CARE SURGERY    Recommended to review admission imaging for incidental findings and document in discharge navigator: Chart reviewed, no known incidental findings noted at this time.      Discharge Plan: Anticipate discharge in 48-72 hrs to discharge location to be determined pending rehab evaluations.  [place] Plan: Voiding trial after improvement in ambulation   Central access plan: Will obtain peripheral access and discontinue prior to transfer    Patient seen and evaluated by Critical Care today and deemed to be appropriate for transfer to Stepdown Level 2. Spoke to Dr. Spaulding  from [facility] to accept transfer. Critical care can be contacted via SecureChat with any questions or concerns. Please use the Critical Care AP Role in Secure Chat for any provider inquires until the patient is transferred out of the ICU or until tomorrow at 0600.    Odilia Fay, DO   Internal Medicine Residency PGY-1   [facility]

## 2025-02-12 NOTE — PROGRESS NOTES
Progress Note - Surgery-General   Name: Christian Liz 64 y.o. male I MRN: [id number]  Unit/Bed#: MICU 07 I Date of Admission: 2/10/2025   Date of Service: 2/12/2025 I Hospital Day: 2     Assessment & Plan  Bleeding duodenal ulcer  S/p 2/10 successful IR embo, after code crimson was called intra-procedure.  2/10 EGD: Multiple ulcers in body of stomach and D1 with adherent clot (Axel IIB), no active bleed. Abnormal esophagus mucosa.    AVSS overnight  Vent settings SIMV 16/400/6/40%    UOP 1L  Stool x 2    GTTs Fentanyl      Plan:  - Abd exams; benign thus far  - monitor OGT output, trend Hb  - wean vent as able  - no indication for operative intervention at this time  - Remainder of care per primary service  Acute blood loss anemia    Hemoptysis    Alcohol abuse      Subjective/Objective   NAOE.  Intubated but following commands.  Denies pain at this time.    Physical Exam:  General: NAD.   CV: nl rate, Cherry, CVC  Lungs: nl wob. No resp distress. On SCMV 16/400/10/40%  ABD: Soft, minimal distention, NT.   Extrem: No CCE    Patient Vitals for the past 24 hrs:   BP Temp Temp src Pulse Resp SpO2 Height Weight   02/12/25 0600 -- 97.9 °F (36.6 °C) -- 67 15 95 % -- --   02/12/25 0543 -- -- -- -- -- -- -- 71.1 kg (156 lb 12 oz)   02/12/25 0500 168/91 98.1 °F (36.7 °C) -- 80 20 95 % -- --   02/12/25 0442 -- -- -- -- -- 98 % -- --   02/12/25 0400 -- 98.8 °F (37.1 °C) -- 98 16 95 % -- --   02/12/25 0300 -- 99 °F (37.2 °C) -- 67 16 95 % -- --   02/12/25 0235 -- -- -- -- -- 96 % -- --   02/12/25 0200 -- 99.1 °F (37.3 °C) -- 63 16 95 % -- --   02/12/25 0100 -- 99.1 °F (37.3 °C) -- 62 16 95 % -- --   02/12/25 0000 -- 99.3 °F (37.4 °C) -- 59 16 96 % -- --   02/11/25 2300 -- 99.3 °F (37.4 °C) -- 60 16 97 % -- --   02/11/25 2237 -- 99.3 °F (37.4 °C) -- 60 16 96 % -- --   02/11/25 2200 -- 99.3 °F (37.4 °C) -- 61 16 96 % -- --   02/11/25 2100 -- 99.5 °F (37.5 °C) -- 61 13 96 % -- --   02/11/25 2010 -- 99.5 °F (37.5 °C) -- 64  "16 98 % -- --   02/11/25 2000 -- 99.5 °F (37.5 °C) -- 65 16 98 % -- --   02/11/25 1900 -- 99.5 °F (37.5 °C) -- 75 (!) 23 99 % -- --   02/11/25 1845 -- 99.3 °F (37.4 °C) -- 74 19 98 % -- --   02/11/25 1830 -- 99.3 °F (37.4 °C) -- 72 18 100 % -- --   02/11/25 1800 -- 99.3 °F (37.4 °C) -- 67 16 100 % -- --   02/11/25 1730 -- 99.3 °F (37.4 °C) -- 60 16 99 % -- --   02/11/25 1700 -- 99.3 °F (37.4 °C) -- 57 16 99 % -- --   02/11/25 1545 106/60 99.1 °F (37.3 °C) -- 64 16 100 % -- --   02/11/25 1530 -- -- -- -- -- 99 % -- --   02/11/25 1500 -- 99 °F (37.2 °C) -- 62 16 99 % -- --   02/11/25 1430 -- 98.8 °F (37.1 °C) -- 61 16 100 % -- --   02/11/25 1400 -- 98.8 °F (37.1 °C) -- 68 16 100 % -- --   02/11/25 1330 -- 98.8 °F (37.1 °C) -- 64 14 99 % -- --   02/11/25 1300 -- 98.8 °F (37.1 °C) -- 65 16 98 % -- --   02/11/25 1114 -- -- -- -- -- 100 % -- --   02/11/25 0956 -- 98.4 °F (36.9 °C) -- 75 18 99 % -- --   02/11/25 0930 -- -- -- 69 16 -- 5' 9\" (1.753 m) 71.6 kg (157 lb 13.6 oz)   02/11/25 0900 -- 98.4 °F (36.9 °C) -- 66 -- 97 % -- --   02/11/25 0834 -- -- -- -- -- 99 % -- --   02/11/25 0830 -- 98.4 °F (36.9 °C) -- 69 16 99 % -- --   02/11/25 0800 -- 98.6 °F (37 °C) -- 70 16 98 % -- --   02/11/25 0744 -- 98.8 °F (37.1 °C) Bladder 69 16 98 % -- --   02/11/25 0740 -- 98.8 °F (37.1 °C) -- 68 16 97 % -- --   02/11/25 0730 -- 98.8 °F (37.1 °C) -- 70 16 97 % -- --   02/11/25 0700 -- 98.8 °F (37.1 °C) -- 80 16 97 % -- --       I/O         02/09 0701  02/10 0700 02/10 0701  02/11 0700    I.V.  2000    Blood  5475    NG/     IV Piggyback  350    Total Intake 190 7825    Urine  350    Emesis/NG output 900 700    Blood  1000    Total Output 900 2050    Net -710 +5775                  Recent Labs     02/10/25  1515 02/10/25  1729 02/10/25  1817 02/10/25  2022 02/10/25  2025 02/10/25  2357 02/11/25  0440 02/11/25  1224 02/11/25  1758 02/12/25  0408 02/12/25  0538   WBC 15.04* 12.22*  --  10.70*  --    < > 9.36 13.18*  --   --  14.92* "   HGB 5.9* 6.7*   < > 11.8* 10.9*   < > 12.8 12.9   < > 13.3 12.6   *  --   --  82*  --    < > 74* 95*  --   --  125*   SODIUM 137 140  --  139  --   --  142  --   --   --   --    K 3.7 4.2  --  3.9  --   --  4.0  --   --   --   --     111*  --  108  --   --  108  --   --   --   --    CO2 27 23   < > 27 29  --  30  --   --   --   --    BUN 24 23  --  21  --   --  24  --   --   --   --    CREATININE 0.40* 0.46*  --  0.47*  --   --  0.62  --   --   --   --    GLUC 144* 141*  --  153*  --   --  92  --   --   --   --    CALCIUM 6.9* 7.0*  --  9.7  --   --  8.9  --   --   --   --    MG  --   --   --  1.8*  --   --  2.2  --   --   --   --    PHOS  --   --   --  4.5*  --   --  3.9  --   --   --   --    AST 16 10*  --  16  --   --  17  --   --   --   --    ALT 12 9  --  14  --   --  15  --   --   --   --    ALKPHOS 32* 26*  --  48  --   --  56  --   --   --   --    TBILI 0.78 1.02*  --  4.27*  --   --  2.59*  --   --   --   --    EGFR 125 118  --  117  --   --  104  --   --   --   --    INR 1.32* 1.63*  --   --   --   --   --   --   --   --   --    LACTICACID 0.7  --   --  1.1  --   --   --   --   --   --   --     < > = values in this interval not displayed.     Lab Results   Component Value Date    BLOODCX No Growth After 4 Days. 02/07/2025    BLOODCX No Growth After 4 Days. 02/07/2025    LEUKOCYTESUR Negative 02/07/2025

## 2025-02-12 NOTE — ASSESSMENT & PLAN NOTE
Documented increase use of alcohol in days/weeks prior to admission.  Unclear on chart review (do not have access to his records from [facility]) how much he was quantifiable he drinking.  Speaking today, he made a hand symbol to signify that he drank very little PTA.  Hope to discussed with him once extubated

## 2025-02-12 NOTE — RESPIRATORY THERAPY NOTE
RT Ventilator Management Note  Christian Liz 64 y.o. male MRN: [id number]  Unit/Bed#: MICU 07 Encounter: [id number]      Daily Screen         2/11/2025  0423 2/11/2025  0834          Patient safety screen outcome:: Failed Failed      Not Ready for Weaning due to:: Underline problem not resolved Underline problem not resolved                Physical Exam:   Assessment Type: Assess only  General Appearance: Sleeping  Respiratory Pattern: Assisted  Chest Assessment: Chest expansion symmetrical  Bilateral Breath Sounds: Diminished, Clear  Cough: Productive  Suction: ET Tube  O2 Device: Ventilator      Resp Comments: Pt. remains on CMV/VC mode.  No changes at this time.  Will continue to monitor pt per protocol.

## 2025-02-12 NOTE — CASE MANAGEMENT
[facility] received APPROVAL for transport authorization.  Insurance: [employer]   Type of transport: Air transport  Date of transport:    2/10  End Location:[facility]   Approved auth #:  [id number] ([id number] )    Care Manager notified: Altagracia LU     Please reach out to  for updates on any clinical information.

## 2025-02-12 NOTE — CHAPLAIN
visited pt on rounds. Pt was up and in a chair. He explained his wife is recovering from cancer and he does not want her to visit him. Expressed that the days in the hospital have been long. Pt requested prayer for him and his wife, and was emotional in conversations about her.  prayed and provided companionship.  will follow up and remains available.
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
## 2025-02-12 NOTE — QUICK NOTE
Spoke with wife Ananya and updated her on the phone. Discussed with family plan of care and that patient has been extubated. All questions were answered.   Attempted to call Ananya, but went to voicemail. Called Miguel on plan of care and that patient will be downgraded from the ICU.   Odilia Fay,    Internal Medicine Residency PGY-1   [facility]

## 2025-02-12 NOTE — ASSESSMENT & PLAN NOTE
Patient with hemorrhagic shock requiring levophed upon transfer from [facility] to [facility]. S/p fluid and blood resuscitation. Now resolved.
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
## 2025-02-12 NOTE — PROGRESS NOTES
Patient has 2 large melanotic stools.  After the first stool patient was observed as he was hemodynamically appropriate.  Patient then had a subsequent large stool.  He remained hemodynamically appropriate.  Laboratory data was checked to assess hemoglobin.  CT scan high-volume bleeding abdomen pelvis obtained.    Update: 6 AM  On review of scan there does not appear to be any acute blush identified.  Final read confirmed no acute bleeding identified.  Hemoglobin this morning remains stable at 12.6.  Coagulation parameters pending.    Plan to update the team on melanotic stool events.  This may be mobilization of large clot burden that was identified in the stomach on EGD.  Patient appears stable for spontaneous breathing trial.  Will defer to rounding team regarding possible EGD before attempting to extubate.    Critical care time does not include procedures or family update.  Critical care time 39 minutes

## 2025-02-12 NOTE — PROGRESS NOTES
"Progress Note - Gastroenterology   Name: Christian Liz 64 y.o. male I MRN: [id number]  [facility] Date of Admission: 2/10/2025   Date of Service: 2/12/2025 I Hospital Day: 2    Assessment & Plan  Bleeding duodenal ulcer  Presents to B as a transfer from [facility] following recurrent episodes of hemoptysis, BRBPR, melena despite multiple EGDs requiring stat IR angiogram and embolization and multiple transfusions.  Appears that source of bleeding were multiple duodenal ulcers, required IR coil embolization of GDA.  Had 2 concerning BMs yesterday, overnight, CT high-volume bleed scan did not show anything concerning at this time (still unable to fully comment on LGI bleeding 2/2 density). Hb remains stable at 12.6, BUN stable.  Findings from stool most likely related to old blood that remains in GI tract, at this time it does not appear that there is new active bleeding.    Plan  -Tentatively no plan for repeat EGD today  -Continue IV Protonix, 40 mg BID  -Maintain two large bore PIV  -Continue to trend CBC, will leave intervals to MICU team  -Maintain Hb >7  -Continue to monitor for BRBPR, dark stools, OGT output concerning for continued bleeding  -Consider starting DVT prophylaxis, nutrition as soon as clinically feasible  Hemoptysis  See \"Bleeding duodenal ulcer\"  Acute blood loss anemia  See \"Bleeding duodenal ulcer\"  Alcohol abuse  Documented increase use of alcohol in days/weeks prior to admission.  Unclear on chart review (do not have access to his records from [facility]) how much he was quantifiable he drinking.  Speaking today, he made a hand symbol to signify that he drank very little PTA.  Hope to discussed with him once extubated    Please review attending attestation for final recommendations and plan    Subjective   Seen this morning.  Was noted to have two concerning BMs overnight/this morning: one melanotic with blood around the surface and then another 1 with a notable amount of " bright red blood.  Had CT high volume bleeding scan: From a GI perspective, unfortunately dense distal bowel and colon limited evaluation for bleeding though there was no reported evidence of high-volume UGI bleed.  Nothing of note regarding OGT output.  Continues to be off any vasopressors, propofol shut off yesterday and remains on fentanyl.  Easily arousable, able to nod yes or no to some questions including nodding no to any abdominal pain.  Nodded no to whether he slept well and appears that this was related to his BMs.  Nodded his head no to if he had any other acute complaints at this time.    Objective :  Temp:  [97.9 °F (36.6 °C)-99.5 °F (37.5 °C)] 97.9 °F (36.6 °C)  HR:  [57-98] 67  BP: (106-168)/(60-91) 168/91  Resp:  [13-23] 15  SpO2:  [95 %-100 %] 95 %  O2 Device: Ventilator  FiO2 (%):  [40] 40    Physical Exam  Vitals reviewed.   Constitutional:       General: He is awake. He is not in acute distress.     Appearance: He is ill-appearing.      Interventions: He is intubated.   HENT:      Head: Normocephalic.   Eyes:      Extraocular Movements: Extraocular movements intact.   Cardiovascular:      Rate and Rhythm: Normal rate and regular rhythm.      Heart sounds:      No friction rub. No gallop.   Pulmonary:      Effort: Pulmonary effort is normal. He is intubated.      Breath sounds: No wheezing or rhonchi.      Comments: Unable to auscultate posterior lung fields  Abdominal:      General: Abdomen is flat. Bowel sounds are normal. There is no distension.      Palpations: Abdomen is soft.      Tenderness: There is no abdominal tenderness.   Musculoskeletal:      Right lower leg: No edema.      Left lower leg: No edema.   Skin:     General: Skin is warm and dry.      Capillary Refill: Capillary refill takes less than 2 seconds.   Neurological:      Mental Status: He is alert and easily aroused.      GCS: GCS eye subscore is 4. GCS verbal subscore is 1. GCS motor subscore is 6.      Comments: GCS: 11T    Psychiatric:         Behavior: Behavior is cooperative.         Lab Results: I have reviewed the following results:       Results from last 7 days   Lab Units 02/12/25  0538 02/12/25  0408 02/11/25  2341 02/11/25  1758 02/11/25  1224 02/11/25  0440 02/10/25  2357 02/10/25  2025 02/10/25  2022 02/10/25  1943 02/10/25  1909 02/10/25  1843 02/10/25  1817 02/10/25  1729 02/10/25  1515 02/10/25  1009 02/10/25  0433 02/09/25  0906 02/09/25  0900 02/09/25  0443 02/08/25  1217 02/08/25  0511 02/07/25  2219 02/07/25  1802   WBC Thousand/uL 14.92*  --   --   --  13.18* 9.36 9.89  --  10.70*  --   --   --   --  12.22* 15.04*  --  15.53*  --  19.02* 16.52*  --  23.21*  --  22.55*   HEMOGLOBIN g/dL 12.6 13.3 12.3 12.9 12.9 12.8 12.4  --  11.8*  --   --   --   --  6.7* 5.9*   < > 7.6*   < > 10.6* 6.8*   < > 9.1* 6.0* 6.2*   I STAT HEMOGLOBIN g/dl  --   --   --   --   --   --   --  10.9*  --  10.2* 8.5* 9.5*  --   --   --   --   --   --   --   --   --   --   --   --    HEMATOCRIT % 36.6 38.8 36.1* 36.6 36.5 35.6* 34.6*  --  33.4*  --   --   --   --  19.7* 16.9*  --  21.9*  --  32.5* 20.5*  --  27.0* 18.1* 19.4*   HEMATOCRIT, ISTAT %  --   --   --   --   --   --   --  32*  --  30* 25* 28* <15*  --   --   --   --   --   --   --   --   --   --   --    PLATELETS Thousands/uL 125*  --   --   --  95* 74* 66*  --  82*  --   --   --   --   --  136*  --  137*  --  142* 186  --  369  --  510*   SEGS PCT % 84*  --   --   --   --   --   --   --  86*  --   --   --   --  79* 87*  --   --   --   --   --   --  86*  --  77*   MONO PCT % 8  --   --   --   --   --   --   --  7  --   --   --   --  10 7  --   --   --   --   --   --  7  --  11   EOS PCT % 1  --   --   --   --   --   --   --  0  --   --   --   --  0 0  --   --   --   --   --   --  0  --  0    < > = values in this interval not displayed.      Results from last 7 days   Lab Units 02/12/25  0538 02/11/25  0440 02/10/25  2025 02/10/25  2022 02/10/25  1943 02/10/25  1909 02/10/25  1844  02/10/25  1817 02/10/25  1729 02/10/25  1515 02/10/25  1009 02/10/25  0433 02/09/25  0900 02/09/25  0648 02/09/25  0443 02/08/25  1521 02/08/25  0511 02/07/25  1802   SODIUM mmol/L 143 142  --  139  --   --   --   --  140 137  --  136 135  --  136 135 133* 128*   POTASSIUM mmol/L 3.6 4.0  --  3.9  --   --   --   --  4.2 3.7  --  3.4* 4.4  --  4.3 4.0 3.9 3.9   CHLORIDE mmol/L 108 108  --  108  --   --   --   --  111* 108  --  107 110*  --  110* 107 102 93*   CO2 mmol/L 29 30  --  27  --   --   --   --  23 27  --  25 22  --  24 25 24 22   CO2, I-STAT mmol/L  --   --  29  --  29 24 23 14*  --   --   --   --   --   --   --   --   --   --    BUN mg/dL 24 24  --  21  --   --   --   --  23 24  --  23 33*  --  35* 32* 40* 49*   CREATININE mg/dL 0.48* 0.62  --  0.47*  --   --   --   --  0.46* 0.40*  --  0.46* 0.61  --  0.63 0.73 0.56* 1.07   CALCIUM mg/dL 8.2* 8.9  --  9.7  --   --   --   --  7.0* 6.9*  --  7.7* 8.2*  --  7.2* 6.8* 8.3* 8.3*   ALK PHOS U/L  --  56  --  48  --   --   --   --  26* 32*  --  30*  --   --  31*  --  56 66   ALT U/L  --  15  --  14  --   --   --   --  9 12  --  10  --   --  9  --  13 16   AST U/L  --  17  --  16  --   --   --   --  10* 16  --  11*  --   --  10*  --  11* 12*   GLUCOSE, ISTAT mg/dl  --   --  156*  --  168* 181* 200* 109  --   --   --   --   --   --   --   --   --   --    MAGNESIUM mg/dL 1.8* 2.2  --  1.8*  --   --   --   --   --   --   --  1.8*  --   --  1.8* 2.1 1.6*  --    PHOSPHORUS mg/dL 3.1 3.9  --  4.5*  --   --   --   --   --   --   --  2.0*  --   --  3.4 3.6 2.3  --    INR   --   --   --   --   --   --   --   --  1.63* 1.32* 1.33*  --   --  1.38*  --   --   --  1.32*   PTT seconds  --   --   --   --   --   --   --   --   --   --   --   --   --   --   --   --   --  27   EGFR ml/min/1.73sq m 116 104  --  117  --   --   --   --  118 125  --  118 105  --  104 98 109 72     Results from last 7 days   Lab Units 02/10/25  6341 02/10/25  1515 02/10/25  1009 02/09/25  0648  "02/07/25  1802   INR  1.63* 1.32* 1.33* 1.38* 1.32*   PTT seconds  --   --   --   --  27     Results from last 7 days   Lab Units 02/10/25  2022   LACTIC ACID mmol/L 1.1         No results found for: \"PHART\", \"STY5COK\", \"PO2ART\", \"ZKI1IVO\", \"F4JSXIFQ\", \"BEART\", \"SOURCE\"  No components found for: \"HIV1X2\"  No results found for: \"HAV\", \"HEPAIGM\", \"HEPBIGM\", \"HEPBCAB\", \"HBEAG\", \"HEPCAB\"  No results found for: \"SPEP\", \"UPEP\" No results found for: \"HGBA1C\"  No results found for: \"CHOL\" No results found for: \"HDL\" No results found for: \"LDLCALC\" No results found for: \"TRIG\"  No components found for: \"PROCAL\"        Imaging Results Review: I personally reviewed the following image studies in PACS and associated radiology reports: CT high-volume bleed scan abdomen pelvis.   "

## 2025-02-12 NOTE — ASSESSMENT & PLAN NOTE
Presents to [facility] as a transfer from [facility] following recurrent episodes of hemoptysis, BRBPR, melena despite multiple EGDs requiring stat IR angiogram and embolization and multiple transfusions.  Appears that source of bleeding were multiple duodenal ulcers, required IR coil embolization of GDA.  Had 2 concerning BMs yesterday, overnight, CT high-volume bleed scan did not show anything concerning at this time (still unable to fully comment on LGI bleeding 2/2 density). Hb remains stable at 12.6, BUN stable.  Findings from stool most likely related to old blood that remains in GI tract, at this time it does not appear that there is new active bleeding.    Plan  -Tentatively no plan for repeat EGD today  -Continue IV Protonix, 40 mg BID  -Maintain two large bore PIV  -Continue to trend CBC, will leave intervals to MICU team  -Maintain Hb >7  -Continue to monitor for BRBPR, dark stools, OGT output concerning for continued bleeding  -Consider starting DVT prophylaxis, nutrition as soon as clinically feasible

## 2025-02-12 NOTE — ASSESSMENT & PLAN NOTE
Hgb 6.2 on arrival. Acute Blood Loss Anemia 2/2 severe duodenal ulcer with active bleeding, initially presented to [facility] and underwent EGD x3 with refractory bleeding so patient was transferred to [facility] for IR intervention. Code crimson called in IR s/t continued bleeding but able to proceed and s/p embolization of gastroduodenal artery 2/11.     20+ pRBCs received s/p code crimson     Plan:   Maintain 2 large bore IVs  Monitor Hgb and transfuse for Hgb <7 or based on hemodynamics if actively bleeding. Monitor platelets.    Continue to monitor for signs of active bleeding.

## 2025-02-12 NOTE — ASSESSMENT & PLAN NOTE
S/p 2/10 successful IR embo, after code crimson was called intra-procedure.  2/10 EGD: Multiple ulcers in body of stomach and D1 with adherent clot (Axel IIB), no active bleed. Abnormal esophagus mucosa.    AVSS overnight  Vent settings SIMV 16/400/6/40%    UOP 1L  Stool x 2    GTTs Fentanyl      Plan:  - Abd exams; benign thus far  - monitor OGT output, trend Hb  - wean vent as able  - no indication for operative intervention at this time  - Remainder of care per primary service

## 2025-02-12 NOTE — PLAN OF CARE
Problem: Prexisting or High Potential for Compromised Skin Integrity  Goal: Skin integrity is maintained or improved  Description: INTERVENTIONS:  - Identify patients at risk for skin breakdown  - Assess and monitor skin integrity  - Assess and monitor nutrition and hydration status  - Monitor labs   - Assess for incontinence   - Turn and reposition patient  - Assist with mobility/ambulation  - Relieve pressure over bony prominences  - Avoid friction and shearing  - Provide appropriate hygiene as needed including keeping skin clean and dry  - Evaluate need for skin moisturizer/barrier cream  - Collaborate with interdisciplinary team   - Patient/family teaching  - Consider wound care consult   Outcome: Progressing     Problem: RESPIRATORY - ADULT  Goal: Achieves optimal ventilation and oxygenation  Description: INTERVENTIONS:  - Assess for changes in respiratory status  - Assess for changes in mentation and behavior  - Position to facilitate oxygenation and minimize respiratory effort  - Oxygen administered by appropriate delivery if ordered  - Initiate smoking cessation education as indicated  - Encourage broncho-pulmonary hygiene including cough, deep breathe, Incentive Spirometry  - Assess the need for suctioning and aspirate as needed  - Assess and instruct to report SOB or any respiratory difficulty  - Respiratory Therapy support as indicated  Outcome: Progressing     Problem: GASTROINTESTINAL - ADULT  Goal: Minimal or absence of nausea and/or vomiting  Description: INTERVENTIONS:  - Administer IV fluids if ordered to ensure adequate hydration  - Maintain NPO status until nausea and vomiting are resolved  - Nasogastric tube if ordered  - Administer ordered antiemetic medications as needed  - Provide nonpharmacologic comfort measures as appropriate  - Advance diet as tolerated, if ordered  - Consider nutrition services referral to assist patient with adequate nutrition and appropriate food choices  Outcome:  Progressing  Goal: Maintains or returns to baseline bowel function  Description: INTERVENTIONS:  - Assess bowel function  - Encourage oral fluids to ensure adequate hydration  - Administer IV fluids if ordered to ensure adequate hydration  - Administer ordered medications as needed  - Encourage mobilization and activity  - Consider nutritional services referral to assist patient with adequate nutrition and appropriate food choices  Outcome: Progressing  Goal: Maintains adequate nutritional intake  Description: INTERVENTIONS:  - Monitor percentage of each meal consumed  - Identify factors contributing to decreased intake, treat as appropriate  - Assist with meals as needed  - Monitor I&O, weight, and lab values if indicated  - Obtain nutrition services referral as needed  Outcome: Progressing  Goal: Establish and maintain optimal ostomy function  Description: INTERVENTIONS:  - Assess bowel function  - Encourage oral fluids to ensure adequate hydration  - Administer IV fluids if ordered to ensure adequate hydration   - Administer ordered medications as needed  - Encourage mobilization and activity  - Nutrition services referral to assist patient with appropriate food choices  - Assess stoma site  - Consider wound care consult   Outcome: Progressing  Goal: Oral mucous membranes remain intact  Description: INTERVENTIONS  - Assess oral mucosa and hygiene practices  - Implement preventative oral hygiene regimen  - Implement oral medicated treatments as ordered  - Initiate Nutrition services referral as needed  Outcome: Progressing     Problem: HEMATOLOGIC - ADULT  Goal: Maintains hematologic stability  Description: INTERVENTIONS  - Assess for signs and symptoms of bleeding or hemorrhage  - Monitor labs  - Administer supportive blood products/factors as ordered and appropriate  Outcome: Progressing
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
## 2025-02-12 NOTE — RESPIRATORY THERAPY NOTE
02/12/25 0940   Respiratory Assessment   Resp Comments Pt passed Spont trial. Pt extubated and placed on nasal cannula at 6l/m. +cuff leak.
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
## 2025-02-12 NOTE — OCCUPATIONAL THERAPY NOTE
OT CANCEL NOTE    OT orders received. Chart reviewed. Pt is currently intubated/sedated and not appropriate to engage in skilled OT services at this time. Will hold initial OT evaluation. Will continue to follow pt on caseload and see pt when medically stable and as clinically appropriate.       02/12/25 0750   OT Last Visit   OT Visit Date 02/12/25   Note Type   Note type Evaluation;Cancelled Session   Cancel Reasons Intubated/sedated         Ann Parker MS, OTR/L

## 2025-02-12 NOTE — CASE MANAGEMENT
Called [facility] [phone]  to check status of transport auth request. Was no answer. No call or fax received. Auth is still pending at this time   Cm notified Altagracia LU

## 2025-02-12 NOTE — ASSESSMENT & PLAN NOTE
"CTAP 2/7:\"Focal grouping of nodularity in the left lower lobe consistent with an infectious/inflammatory infiltrate.Heterogeneous density within the stomach and duodenum may represent hemorrhage considering clinical history. No active arterial extravasation or focal wall abnormality identified. No free air or adjacent fluid collection. Multifocal right greater than left anterior subacute/healing rib fractures. Healing subacute manubrial fracture.\"    COVID, flu, RSV negative.  Leigonella and strep pneumo antigen negative.  H pylori stool antigen negative.  Procal 0.55 on 2/11.     Patient with RLL consolidation thought to be pneumonia and pt treated with 5 days of Ceftriaxone at [facility]. Other differentials include abscess as seen on CT chest imaging as well as possible hematoma from recent rib fracture and pulmonary laceration.    Plan:  Ceftriaxone completed, continue to monitor for signs of infection  Respiratory protocol, continuous pulse oximetry.   Incentive spirometry. Pulmonary toilet. Maintain O2 sat >90%.  Supplemental O2: 6L NC during prior hospitalization. Wean as tolerated.    "

## 2025-02-12 NOTE — ASSESSMENT & PLAN NOTE
Hgb 6.2 on arrival. Acute Blood Loss Anemia 2/2 severe duodenal ulcer with active bleeding, initially presented to [facility] and underwent EGD x3 with refractory bleeding so patient was transferred to [facility] for IR intervention. Code crimson called in IR s/t continued bleeding but able to proceed and s/p embolization of gastroduodenal artery 2/11.     20+ pRBCs received s/p code crimson     Plan:   Maintain 2 large bore IVs  Monitor Hgb and transfuse for Hgb <7 or based on hemodynamics if actively bleeding. Monitor platelets.    Continue to monitor for signs of active bleeding.

## 2025-02-12 NOTE — ANESTHESIA POSTPROCEDURE EVALUATION
Post-Op Assessment Note    CV Status:  Stable    Pain management: adequate       Mental Status:  Unresponsive   Hydration Status:  Euvolemic   PONV Controlled:  Controlled   Airway Patency:  Patent  Airway: intubated     Post Op Vitals Reviewed: Yes    No anethesia notable event occurred.    Staff: Anesthesiologist     Reason for prolonged intubation > 24 hours:  Hemorrhagic shock      Last Filed PACU Vitals:  Vitals Value Taken Time   Temp 98.78 °F (37.1 °C) 02/12/25 1044   Pulse 93 02/12/25 1044   BP     Resp 31 02/12/25 1044   SpO2 97 % 02/12/25 1044   Vitals shown include unfiled device data.

## 2025-02-12 NOTE — ASSESSMENT & PLAN NOTE
Patient initially presented to [facility] and underwent EGD x3 with refractory bleeding so patient was transferred to [facility] for IR intervention. Code crimson called in IR s/t continued bleeding but able to proceed and s/p embolization of gastroduodenal artery 2/11.    Plan:  GI and IR consulted and following  Continue IV PPI   Maintain 2 large bore IVs  Monitor Hgb and transfuse for Hgb <7 or based on hemodynamics if actively bleeding. Monitor platelets.    Continue to monitor for signs of active bleeding.    Plan for post intervention, repeat EGD with GI to be determined; currently planned for 2/13, NPO after midnight

## 2025-02-13 ENCOUNTER — PREP FOR PROCEDURE (OUTPATIENT)
Dept: GASTROENTEROLOGY | Facility: CLINIC | Age: 65
End: 2025-02-13

## 2025-02-13 DIAGNOSIS — Z12.11 SCREENING FOR COLON CANCER: Primary | ICD-10-CM

## 2025-02-13 DIAGNOSIS — K26.4 BLEEDING DUODENAL ULCER: ICD-10-CM

## 2025-02-13 PROBLEM — R06.02 SHORTNESS OF BREATH: Status: ACTIVE | Noted: 2025-02-13

## 2025-02-13 LAB
ABO GROUP BLD BPU: NORMAL
ANION GAP SERPL CALCULATED.3IONS-SCNC: 6 MMOL/L (ref 4–13)
BACTERIA BLD CULT: NORMAL
BACTERIA BLD CULT: NORMAL
BACTERIA SPT RESP CULT: ABNORMAL
BACTERIA SPT RESP CULT: ABNORMAL
BASOPHILS # BLD AUTO: 0.06 THOUSANDS/ΜL (ref 0–0.1)
BASOPHILS NFR BLD AUTO: 1 % (ref 0–1)
BPU ID: NORMAL
BUN SERPL-MCNC: 14 MG/DL (ref 5–25)
CALCIUM SERPL-MCNC: 7.8 MG/DL (ref 8.4–10.2)
CHLORIDE SERPL-SCNC: 104 MMOL/L (ref 96–108)
CO2 SERPL-SCNC: 30 MMOL/L (ref 21–32)
CREAT SERPL-MCNC: 0.37 MG/DL (ref 0.6–1.3)
CROSSMATCH: NORMAL
EOSINOPHIL # BLD AUTO: 0.19 THOUSAND/ΜL (ref 0–0.61)
EOSINOPHIL NFR BLD AUTO: 2 % (ref 0–6)
ERYTHROCYTE [DISTWIDTH] IN BLOOD BY AUTOMATED COUNT: 17.2 % (ref 11.6–15.1)
GFR SERPL CREATININE-BSD FRML MDRD: 129 ML/MIN/1.73SQ M
GLUCOSE SERPL-MCNC: 96 MG/DL (ref 65–140)
GRAM STN SPEC: ABNORMAL
GRAM STN SPEC: ABNORMAL
HCT VFR BLD AUTO: 36.4 % (ref 36.5–49.3)
HCT VFR BLD AUTO: 39.7 % (ref 36.5–49.3)
HGB BLD-MCNC: 12.3 G/DL (ref 12–17)
HGB BLD-MCNC: 13.5 G/DL (ref 12–17)
IMM GRANULOCYTES # BLD AUTO: 0.13 THOUSAND/UL (ref 0–0.2)
IMM GRANULOCYTES NFR BLD AUTO: 1 % (ref 0–2)
LYMPHOCYTES # BLD AUTO: 0.82 THOUSANDS/ΜL (ref 0.6–4.47)
LYMPHOCYTES NFR BLD AUTO: 6 % (ref 14–44)
MAGNESIUM SERPL-MCNC: 1.7 MG/DL (ref 1.9–2.7)
MCH RBC QN AUTO: 30.2 PG (ref 26.8–34.3)
MCHC RBC AUTO-ENTMCNC: 34 G/DL (ref 31.4–37.4)
MCV RBC AUTO: 89 FL (ref 82–98)
MONOCYTES # BLD AUTO: 1.07 THOUSAND/ΜL (ref 0.17–1.22)
MONOCYTES NFR BLD AUTO: 8 % (ref 4–12)
NEUTROPHILS # BLD AUTO: 10.82 THOUSANDS/ΜL (ref 1.85–7.62)
NEUTS SEG NFR BLD AUTO: 82 % (ref 43–75)
NRBC BLD AUTO-RTO: 0 /100 WBCS
PHOSPHATE SERPL-MCNC: 2.3 MG/DL (ref 2.3–4.1)
PLATELET # BLD AUTO: 154 THOUSANDS/UL (ref 149–390)
PMV BLD AUTO: 9.5 FL (ref 8.9–12.7)
POTASSIUM SERPL-SCNC: 3.1 MMOL/L (ref 3.5–5.3)
RBC # BLD AUTO: 4.47 MILLION/UL (ref 3.88–5.62)
SODIUM SERPL-SCNC: 140 MMOL/L (ref 135–147)
UNIT DISPENSE STATUS: NORMAL
UNIT PRODUCT CODE: NORMAL
UNIT PRODUCT VOLUME: 350 ML
UNIT RH: NORMAL
WBC # BLD AUTO: 13.09 THOUSAND/UL (ref 4.31–10.16)

## 2025-02-13 PROCEDURE — 85025 COMPLETE CBC W/AUTO DIFF WBC: CPT

## 2025-02-13 PROCEDURE — 97116 GAIT TRAINING THERAPY: CPT

## 2025-02-13 PROCEDURE — 94664 DEMO&/EVAL PT USE INHALER: CPT

## 2025-02-13 PROCEDURE — 80048 BASIC METABOLIC PNL TOTAL CA: CPT

## 2025-02-13 PROCEDURE — 84100 ASSAY OF PHOSPHORUS: CPT

## 2025-02-13 PROCEDURE — 99232 SBSQ HOSP IP/OBS MODERATE 35: CPT | Performed by: INTERNAL MEDICINE

## 2025-02-13 PROCEDURE — 83735 ASSAY OF MAGNESIUM: CPT

## 2025-02-13 PROCEDURE — 97530 THERAPEUTIC ACTIVITIES: CPT

## 2025-02-13 RX ORDER — POTASSIUM CHLORIDE 1500 MG/1
40 TABLET, EXTENDED RELEASE ORAL ONCE
Status: COMPLETED | OUTPATIENT
Start: 2025-02-13 | End: 2025-02-13

## 2025-02-13 RX ORDER — FUROSEMIDE 10 MG/ML
40 INJECTION INTRAMUSCULAR; INTRAVENOUS ONCE
Status: COMPLETED | OUTPATIENT
Start: 2025-02-13 | End: 2025-02-13

## 2025-02-13 RX ORDER — MAGNESIUM SULFATE HEPTAHYDRATE 40 MG/ML
2 INJECTION, SOLUTION INTRAVENOUS ONCE
Status: COMPLETED | OUTPATIENT
Start: 2025-02-13 | End: 2025-02-13

## 2025-02-13 RX ORDER — SODIUM CHLORIDE, SODIUM LACTATE, POTASSIUM CHLORIDE, CALCIUM CHLORIDE 600; 310; 30; 20 MG/100ML; MG/100ML; MG/100ML; MG/100ML
125 INJECTION, SOLUTION INTRAVENOUS CONTINUOUS
OUTPATIENT
Start: 2025-02-13

## 2025-02-13 RX ADMIN — PANTOPRAZOLE SODIUM 40 MG: 40 INJECTION, POWDER, FOR SOLUTION INTRAVENOUS at 08:35

## 2025-02-13 RX ADMIN — PANTOPRAZOLE SODIUM 40 MG: 40 INJECTION, POWDER, FOR SOLUTION INTRAVENOUS at 21:58

## 2025-02-13 RX ADMIN — POTASSIUM CHLORIDE 40 MEQ: 1500 TABLET, EXTENDED RELEASE ORAL at 10:58

## 2025-02-13 RX ADMIN — MAGNESIUM SULFATE HEPTAHYDRATE 2 G: 40 INJECTION, SOLUTION INTRAVENOUS at 10:55

## 2025-02-13 RX ADMIN — CHLORHEXIDINE GLUCONATE 0.12% ORAL RINSE 15 ML: 1.2 LIQUID ORAL at 08:35

## 2025-02-13 RX ADMIN — CHLORHEXIDINE GLUCONATE 0.12% ORAL RINSE 15 ML: 1.2 LIQUID ORAL at 21:58

## 2025-02-13 RX ADMIN — THIAMINE HYDROCHLORIDE: 100 INJECTION, SOLUTION INTRAMUSCULAR; INTRAVENOUS at 08:36

## 2025-02-13 RX ADMIN — FUROSEMIDE 40 MG: 10 INJECTION, SOLUTION INTRAMUSCULAR; INTRAVENOUS at 10:58
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
## 2025-02-13 NOTE — ASSESSMENT & PLAN NOTE
Patient initially presented to [facility] and underwent EGD x3 with refractory bleeding so patient was transferred to [facility] for IR intervention. Code crimson called in IR s/t continued bleeding but able to proceed and s/p embolization of gastroduodenal artery 2/11.    Plan:  GI and IR consulted and following  Given stable Hbg and vitals, GI suggesting deferring additional EGD, possible discharge tomorrow if patient remains stable  Continue IV PPI   Monitor Hgb and transfuse for Hgb <7 or based on hemodynamics if actively bleeding. Monitor platelets.    Continue to monitor for signs of active bleeding.

## 2025-02-13 NOTE — ASSESSMENT & PLAN NOTE
Presents to [facility] as a transfer from [facility] following recurrent episodes of hemoptysis, BRBPR, melena despite multiple EGDs requiring stat IR angiogram and embolization and multiple transfusions.  Appears that source of bleeding were multiple duodenal ulcers, required IR coil embolization of GDA.  Extubated and downgraded from ICU yesterday.  Doing well today, no complaints or significant GI symptoms.  Tolerating diet, denying any pain.    Plan  -No EGD today; can restart diet, advance as tolerated  -Continue Protonix, 40 mg BID, can transition to p.o. PPI  -Will need to be on PPI BID for 2 weeks followed by PPI QD for six weeks  -Needs outpatient GI follow-up  -Needs repeat EGD in ~three months, needs to be off PPI for at least two weeks prior to repeat EGD   -Also needs colonoscopy for CRC screening, may be able to be done together with EGD  -Maintain two large bore PIV  -Trend CBC daily  -Maintain Hb >7  -Continue to monitor for BRBPR, dark stools, hemodynamic instability, significant downtrend in Hb

## 2025-02-13 NOTE — PHYSICAL THERAPY NOTE
"                                                                                  PHYSICAL THERAPY NOTE          Patient Name: Christian Liz  Today's Date: 2/13/2025 02/13/25 1053   PT Last Visit   PT Visit Date 02/13/25   Note Type   Note Type Treatment   Pain Assessment   Pain Assessment Tool 0-10   Pain Score No Pain   Restrictions/Precautions   Weight Bearing Precautions Per Order No   Other Precautions Chair Alarm;Bed Alarm;Multiple lines;Fall Risk;O2;Pain  (2L O2)   General   Chart Reviewed Yes   Cognition   Overall Cognitive Status WFL   Arousal/Participation Alert;Responsive   Attention Within functional limits   Orientation Level Oriented X4   Following Commands Follows one step commands without difficulty   Comments Pt cooperative during session   Subjective   Subjective \"My legs are very weak\"   Bed Mobility   Supine to Sit 4  Minimal assistance   Additional items Assist x 1;HOB elevated;Bedrails;Increased time required;Verbal cues;LE management   Sit to Supine Unable to assess   Additional Comments Pt in bed upona rrival. EOB sitting with close SUP~ 3-4 minutes. Pt c/o lightheadedness upon initial sitting, symptoms improve with maintained position . BP checked 151/87.   Transfers   Sit to Stand 4  Minimal assistance   Additional items Assist x 1;Increased time required;Verbal cues   Stand to Sit 4  Minimal assistance   Additional items Assist x 1;Armrests;Increased time required;Verbal cues   Toilet transfer 4  Minimal assistance   Additional items Assist x 1;Increased time required;Standard toilet   Additional Comments w/RW - VCs for hand placement.Completes 4 STS during session- initially min assist then progressing to supervision with RW.   Ambulation/Elevation   Gait pattern Forward Flexion;Narrow TERESSA;Excessively slow;Step to;Short stride   Gait Assistance 4  Minimal assist   Additional items Assist x 1;Verbal cues   Assistive Device Rolling walker   Distance 2 ft + 4ft + 8 ft "   Ambulation/Elevation Additional Comments Ambulates short distances with min assist x 1 and RW- slow gait, pt fatigues quickly with minimal activity . Currently on 2L O2, SPO2 after ambulating to bathroom and back at 83%, recovers to 94% with rest on 2L O2.   Balance   Static Sitting Fair   Dynamic Sitting Fair -   Static Standing Poor +   Dynamic Standing Poor   Ambulatory Poor   Endurance Deficit   Endurance Deficit Yes   Activity Tolerance   Activity Tolerance Patient limited by fatigue   Nurse Made Aware RN cleared pt for therapy   Assessment   Prognosis Good   Problem List Decreased endurance;Decreased mobility;Decreased strength;Impaired balance   Assessment Pt seen for PT treatment session this date. Therapy session focused on bed mobility, functional transfers, and ambulation in order to improve overall mobility and independence. Pt requires min assist for mobility tasks during session as detailed in flowsheet, does show progress with transfers completing with supervision, but continues assist for ambulation. Easily fatigued , required extended rest breaks between tasks.Pt educated regarding HEP, able to demonstrate with appropriate technqiue. Pt making steady progress toward goals. Pt was left in recliner at the end of PT session with all needs in reach. Pt would benefit from continued PT services while in hospital to address remaining limitations.The patient's AM-PAC Basic Mobility Inpatient Short Form Raw Score is 16. A Raw score of less than or equal to 16 suggests the patient may benefit from discharge to post-acute rehabilitation services. Please also refer to the recommendation of the Physical Therapist for safe discharge planning. Post dc recommendation is Level II at this time as pt continues to require assistance for mobility and impaired from baseline. Pt prefers to be dc home with family, post dc rec pending level of assist available at home.   Goals   Patient Goals to get up   STG Expiration  Date 02/26/25   PT Treatment Day 1   Plan   Treatment/Interventions Functional transfer training;Elevations;Therapeutic exercise;Bed mobility;Gait training;Spoke to nursing;Spoke to case management;OT   Progress Slow progress, decreased activity tolerance   PT Frequency 3-5x/wk   Discharge Recommendation   Rehab Resource Intensity Level, PT II (Moderate Resource Intensity)  (pending level of care available at home)   Equipment Recommended Walker   AM-PAC Basic Mobility Inpatient   Turning in Flat Bed Without Bedrails 3   Lying on Back to Sitting on Edge of Flat Bed Without Bedrails 2   Moving Bed to Chair 3   Standing Up From Chair Using Arms 3   Walk in Room 3   Climb 3-5 Stairs With Railing 2   Basic Mobility Inpatient Raw Score 16   Basic Mobility Standardized Score 38.32   [facility] Highest Level Of Mobility   -HLM Goal 5: Stand one or more mins   -HLM Achieved 5: Stand (1 or more minutes)   Education   Education Provided Mobility training   Patient Reinforcement needed;Demonstrates verbal understanding   End of Consult   Patient Position at End of Consult All needs within reach;Bed/Chair alarm activated;Bedside chair   Meli Gibson PT DPT

## 2025-02-13 NOTE — PLAN OF CARE
Problem: Prexisting or High Potential for Compromised Skin Integrity  Goal: Skin integrity is maintained or improved  Description: INTERVENTIONS:  - Identify patients at risk for skin breakdown  - Assess and monitor skin integrity  - Assess and monitor nutrition and hydration status  - Monitor labs   - Assess for incontinence   - Turn and reposition patient  - Assist with mobility/ambulation  - Relieve pressure over bony prominences  - Avoid friction and shearing  - Provide appropriate hygiene as needed including keeping skin clean and dry  - Evaluate need for skin moisturizer/barrier cream  - Collaborate with interdisciplinary team   - Patient/family teaching  - Consider wound care consult   Outcome: Progressing     Problem: RESPIRATORY - ADULT  Goal: Achieves optimal ventilation and oxygenation  Description: INTERVENTIONS:  - Assess for changes in respiratory status  - Assess for changes in mentation and behavior  - Position to facilitate oxygenation and minimize respiratory effort  - Oxygen administered by appropriate delivery if ordered  - Initiate smoking cessation education as indicated  - Encourage broncho-pulmonary hygiene including cough, deep breathe, Incentive Spirometry  - Assess the need for suctioning and aspirate as needed  - Assess and instruct to report SOB or any respiratory difficulty  - Respiratory Therapy support as indicated  Outcome: Progressing     Problem: GASTROINTESTINAL - ADULT  Goal: Minimal or absence of nausea and/or vomiting  Description: INTERVENTIONS:  - Administer IV fluids if ordered to ensure adequate hydration  - Maintain NPO status until nausea and vomiting are resolved  - Nasogastric tube if ordered  - Administer ordered antiemetic medications as needed  - Provide nonpharmacologic comfort measures as appropriate  - Advance diet as tolerated, if ordered  - Consider nutrition services referral to assist patient with adequate nutrition and appropriate food choices  Outcome:  Progressing  Goal: Maintains or returns to baseline bowel function  Description: INTERVENTIONS:  - Assess bowel function  - Encourage oral fluids to ensure adequate hydration  - Administer IV fluids if ordered to ensure adequate hydration  - Administer ordered medications as needed  - Encourage mobilization and activity  - Consider nutritional services referral to assist patient with adequate nutrition and appropriate food choices  Outcome: Progressing  Goal: Maintains adequate nutritional intake  Description: INTERVENTIONS:  - Monitor percentage of each meal consumed  - Identify factors contributing to decreased intake, treat as appropriate  - Assist with meals as needed  - Monitor I&O, weight, and lab values if indicated  - Obtain nutrition services referral as needed  Outcome: Progressing  Goal: Establish and maintain optimal ostomy function  Description: INTERVENTIONS:  - Assess bowel function  - Encourage oral fluids to ensure adequate hydration  - Administer IV fluids if ordered to ensure adequate hydration   - Administer ordered medications as needed  - Encourage mobilization and activity  - Nutrition services referral to assist patient with appropriate food choices  - Assess stoma site  - Consider wound care consult   Outcome: Progressing  Goal: Oral mucous membranes remain intact  Description: INTERVENTIONS  - Assess oral mucosa and hygiene practices  - Implement preventative oral hygiene regimen  - Implement oral medicated treatments as ordered  - Initiate Nutrition services referral as needed  Outcome: Progressing     Problem: HEMATOLOGIC - ADULT  Goal: Maintains hematologic stability  Description: INTERVENTIONS  - Assess for signs and symptoms of bleeding or hemorrhage  - Monitor labs  - Administer supportive blood products/factors as ordered and appropriate  Outcome: Progressing     Problem: Nutrition/Hydration-ADULT  Goal: Nutrient/Hydration intake appropriate for improving, restoring or maintaining  nutritional needs  Description: Monitor and assess patient's nutrition/hydration status for malnutrition. Collaborate with interdisciplinary team and initiate plan and interventions as ordered.  Monitor patient's weight and dietary intake as ordered or per policy. Utilize nutrition screening tool and intervene as necessary. Determine patient's food preferences and provide high-protein, high-caloric foods as appropriate.     INTERVENTIONS:  - Monitor oral intake, urinary output, labs, and treatment plans  - Assess nutrition and hydration status and recommend course of action  - Evaluate amount of meals eaten  - Assist patient with eating if necessary   - Allow adequate time for meals  - Recommend/ encourage appropriate diets, oral nutritional supplements, and vitamin/mineral supplements  - Order, calculate, and assess calorie counts as needed  - Recommend, monitor, and adjust tube feedings and TPN/PPN based on assessed needs  - Assess need for intravenous fluids  - Provide specific nutrition/hydration education as appropriate  - Include patient/family/caregiver in decisions related to nutrition  Outcome: Progressing

## 2025-02-13 NOTE — QUICK NOTE
Care team attempted to reach out to family on file. Left a voicemail requesting callback. Care team will continue to keep family updated.
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
## 2025-02-13 NOTE — ASSESSMENT & PLAN NOTE
Documented increase use of alcohol in days/weeks prior to admission.  Unclear on chart review (do not have access to his records from [facility]) how much he was quantifiable he drinking.  Speaking to him this morning, says he drinks around three beers a day and has been doing so for the last ~40 years.

## 2025-02-13 NOTE — PLAN OF CARE
Problem: PHYSICAL THERAPY ADULT  Goal: Performs mobility at highest level of function for planned discharge setting.  See evaluation for individualized goals.  Description: Treatment/Interventions: ADL retraining, Functional transfer training, LE strengthening/ROM, Elevations, Therapeutic exercise, Endurance training, Patient/family training, Equipment eval/education, Bed mobility, Gait training, Compensatory technique education, Spoke to nursing, Spoke to case management, Spoke to advanced practitioner, OT  Equipment Recommended:  (TBD)       See flowsheet documentation for full assessment, interventions and recommendations.  Outcome: Progressing  Note: Prognosis: Good  Problem List: Decreased endurance, Decreased mobility, Decreased strength, Impaired balance  Assessment: Pt seen for PT treatment session this date. Therapy session focused on bed mobility, functional transfers, and ambulation in order to improve overall mobility and independence. Pt requires min assist for mobility tasks during session as detailed in flowsheet, does show progress with transfers completing with supervision, but continues assist for ambulation. Easily fatigued , required extended rest breaks between tasks. Pt making steady progress toward goals. Pt was left in recliner at the end of PT session with all needs in reach. Pt would benefit from continued PT services while in hospital to address remaining limitations.The patient's AM-PAC Basic Mobility Inpatient Short Form Raw Score is 16. A Raw score of less than or equal to 16 suggests the patient may benefit from discharge to post-acute rehabilitation services. Please also refer to the recommendation of the Physical Therapist for safe discharge planning. Post dc recommendation is Level II at this time as pt continues to require assistance for mobility and impaired from baseline. Pt prefers to be dc home with family, post dc rec pending level of assist available at home.        Rehab  Resource Intensity Level, PT: II (Moderate Resource Intensity) (pending level of care available at home)    See flowsheet documentation for full assessment.

## 2025-02-13 NOTE — ASSESSMENT & PLAN NOTE
Patient complaining of new shortness of breath overnight, s/p 20+ pRBC. Echo on 2/11 unremarkable. CXR on 2/11 showed evidence of BL pleural effusions. PhE today with right sided basilar rales, upper and lower extremity swelling, likely in setting of massive fluid supplementation.     Plan:  - Lasix 40 mg today  - Monitor I/O   - Daily weights  - Electrolytes repleted

## 2025-02-14 PROBLEM — J96.01 ACUTE HYPOXIC RESPIRATORY FAILURE (HCC): Status: ACTIVE | Noted: 2025-02-13

## 2025-02-14 PROBLEM — E87.6 HYPOKALEMIA: Status: ACTIVE | Noted: 2025-02-14

## 2025-02-14 LAB
ANION GAP SERPL CALCULATED.3IONS-SCNC: 7 MMOL/L (ref 4–13)
ANION GAP SERPL CALCULATED.3IONS-SCNC: 8 MMOL/L (ref 4–13)
BASOPHILS # BLD AUTO: 0.04 THOUSANDS/ΜL (ref 0–0.1)
BASOPHILS NFR BLD AUTO: 0 % (ref 0–1)
BUN SERPL-MCNC: 12 MG/DL (ref 5–25)
BUN SERPL-MCNC: 9 MG/DL (ref 5–25)
CALCIUM SERPL-MCNC: 8.3 MG/DL (ref 8.4–10.2)
CALCIUM SERPL-MCNC: 8.4 MG/DL (ref 8.4–10.2)
CHLORIDE SERPL-SCNC: 94 MMOL/L (ref 96–108)
CHLORIDE SERPL-SCNC: 95 MMOL/L (ref 96–108)
CO2 SERPL-SCNC: 32 MMOL/L (ref 21–32)
CO2 SERPL-SCNC: 32 MMOL/L (ref 21–32)
CREAT SERPL-MCNC: 0.39 MG/DL (ref 0.6–1.3)
CREAT SERPL-MCNC: 0.4 MG/DL (ref 0.6–1.3)
EOSINOPHIL # BLD AUTO: 0.2 THOUSAND/ΜL (ref 0–0.61)
EOSINOPHIL NFR BLD AUTO: 2 % (ref 0–6)
ERYTHROCYTE [DISTWIDTH] IN BLOOD BY AUTOMATED COUNT: 15.6 % (ref 11.6–15.1)
GFR SERPL CREATININE-BSD FRML MDRD: 125 ML/MIN/1.73SQ M
GFR SERPL CREATININE-BSD FRML MDRD: 126 ML/MIN/1.73SQ M
GLUCOSE SERPL-MCNC: 146 MG/DL (ref 65–140)
GLUCOSE SERPL-MCNC: 92 MG/DL (ref 65–140)
HCT VFR BLD AUTO: 40.2 % (ref 36.5–49.3)
HGB BLD-MCNC: 13.7 G/DL (ref 12–17)
IMM GRANULOCYTES # BLD AUTO: 0.17 THOUSAND/UL (ref 0–0.2)
IMM GRANULOCYTES NFR BLD AUTO: 1 % (ref 0–2)
LYMPHOCYTES # BLD AUTO: 0.89 THOUSANDS/ΜL (ref 0.6–4.47)
LYMPHOCYTES NFR BLD AUTO: 7 % (ref 14–44)
MAGNESIUM SERPL-MCNC: 1.6 MG/DL (ref 1.9–2.7)
MCH RBC QN AUTO: 29.7 PG (ref 26.8–34.3)
MCHC RBC AUTO-ENTMCNC: 34.1 G/DL (ref 31.4–37.4)
MCV RBC AUTO: 87 FL (ref 82–98)
MONOCYTES # BLD AUTO: 1.14 THOUSAND/ΜL (ref 0.17–1.22)
MONOCYTES NFR BLD AUTO: 8 % (ref 4–12)
NEUTROPHILS # BLD AUTO: 11.09 THOUSANDS/ΜL (ref 1.85–7.62)
NEUTS SEG NFR BLD AUTO: 82 % (ref 43–75)
NRBC BLD AUTO-RTO: 0 /100 WBCS
PLATELET # BLD AUTO: 220 THOUSANDS/UL (ref 149–390)
PMV BLD AUTO: 9.5 FL (ref 8.9–12.7)
POTASSIUM SERPL-SCNC: 2.9 MMOL/L (ref 3.5–5.3)
POTASSIUM SERPL-SCNC: 3.2 MMOL/L (ref 3.5–5.3)
RBC # BLD AUTO: 4.62 MILLION/UL (ref 3.88–5.62)
SODIUM SERPL-SCNC: 133 MMOL/L (ref 135–147)
SODIUM SERPL-SCNC: 135 MMOL/L (ref 135–147)
WBC # BLD AUTO: 13.53 THOUSAND/UL (ref 4.31–10.16)

## 2025-02-14 PROCEDURE — 85025 COMPLETE CBC W/AUTO DIFF WBC: CPT

## 2025-02-14 PROCEDURE — 83735 ASSAY OF MAGNESIUM: CPT

## 2025-02-14 PROCEDURE — 97530 THERAPEUTIC ACTIVITIES: CPT

## 2025-02-14 PROCEDURE — 80048 BASIC METABOLIC PNL TOTAL CA: CPT

## 2025-02-14 PROCEDURE — 99232 SBSQ HOSP IP/OBS MODERATE 35: CPT | Performed by: INTERNAL MEDICINE

## 2025-02-14 PROCEDURE — 97535 SELF CARE MNGMENT TRAINING: CPT

## 2025-02-14 PROCEDURE — 97116 GAIT TRAINING THERAPY: CPT

## 2025-02-14 RX ORDER — PANTOPRAZOLE SODIUM 40 MG/1
40 TABLET, DELAYED RELEASE ORAL
Status: DISCONTINUED | OUTPATIENT
Start: 2025-02-14 | End: 2025-02-16 | Stop reason: HOSPADM

## 2025-02-14 RX ORDER — POTASSIUM CHLORIDE 14.9 MG/ML
20 INJECTION INTRAVENOUS
Status: DISPENSED | OUTPATIENT
Start: 2025-02-14 | End: 2025-02-15

## 2025-02-14 RX ORDER — MAGNESIUM SULFATE HEPTAHYDRATE 40 MG/ML
2 INJECTION, SOLUTION INTRAVENOUS ONCE
Status: COMPLETED | OUTPATIENT
Start: 2025-02-14 | End: 2025-02-15

## 2025-02-14 RX ORDER — POTASSIUM CHLORIDE 1500 MG/1
40 TABLET, EXTENDED RELEASE ORAL 2 TIMES DAILY
Status: DISCONTINUED | OUTPATIENT
Start: 2025-02-14 | End: 2025-02-14

## 2025-02-14 RX ORDER — POTASSIUM CHLORIDE 1500 MG/1
40 TABLET, EXTENDED RELEASE ORAL ONCE
Status: DISCONTINUED | OUTPATIENT
Start: 2025-02-14 | End: 2025-02-14

## 2025-02-14 RX ORDER — POTASSIUM CHLORIDE 1500 MG/1
20 TABLET, EXTENDED RELEASE ORAL ONCE
Status: COMPLETED | OUTPATIENT
Start: 2025-02-14 | End: 2025-02-14

## 2025-02-14 RX ORDER — POTASSIUM CHLORIDE 14.9 MG/ML
20 INJECTION INTRAVENOUS
Status: COMPLETED | OUTPATIENT
Start: 2025-02-14 | End: 2025-02-14

## 2025-02-14 RX ORDER — POTASSIUM CHLORIDE 1500 MG/1
40 TABLET, EXTENDED RELEASE ORAL ONCE
Status: COMPLETED | OUTPATIENT
Start: 2025-02-14 | End: 2025-02-14

## 2025-02-14 RX ORDER — FUROSEMIDE 10 MG/ML
40 INJECTION INTRAMUSCULAR; INTRAVENOUS ONCE
Status: COMPLETED | OUTPATIENT
Start: 2025-02-14 | End: 2025-02-14

## 2025-02-14 RX ORDER — POTASSIUM CHLORIDE 1500 MG/1
20 TABLET, EXTENDED RELEASE ORAL ONCE
Status: DISCONTINUED | OUTPATIENT
Start: 2025-02-14 | End: 2025-02-14

## 2025-02-14 RX ADMIN — CHLORHEXIDINE GLUCONATE 0.12% ORAL RINSE 15 ML: 1.2 LIQUID ORAL at 22:48

## 2025-02-14 RX ADMIN — PANTOPRAZOLE SODIUM 40 MG: 40 TABLET, DELAYED RELEASE ORAL at 17:55

## 2025-02-14 RX ADMIN — MAGNESIUM SULFATE HEPTAHYDRATE 2 G: 40 INJECTION, SOLUTION INTRAVENOUS at 22:47

## 2025-02-14 RX ADMIN — POTASSIUM CHLORIDE 40 MEQ: 1500 TABLET, EXTENDED RELEASE ORAL at 17:55

## 2025-02-14 RX ADMIN — POTASSIUM CHLORIDE 20 MEQ: 14.9 INJECTION, SOLUTION INTRAVENOUS at 10:03

## 2025-02-14 RX ADMIN — FUROSEMIDE 40 MG: 10 INJECTION, SOLUTION INTRAMUSCULAR; INTRAVENOUS at 10:19

## 2025-02-14 RX ADMIN — POTASSIUM CHLORIDE 20 MEQ: 14.9 INJECTION, SOLUTION INTRAVENOUS at 14:13

## 2025-02-14 RX ADMIN — POTASSIUM CHLORIDE 20 MEQ: 14.9 INJECTION, SOLUTION INTRAVENOUS at 22:44

## 2025-02-14 RX ADMIN — CHLORHEXIDINE GLUCONATE 0.12% ORAL RINSE 15 ML: 1.2 LIQUID ORAL at 10:19

## 2025-02-14 RX ADMIN — POTASSIUM CHLORIDE 20 MEQ: 14.9 INJECTION, SOLUTION INTRAVENOUS at 11:38

## 2025-02-14 RX ADMIN — THIAMINE HYDROCHLORIDE: 100 INJECTION, SOLUTION INTRAMUSCULAR; INTRAVENOUS at 09:58

## 2025-02-14 RX ADMIN — POTASSIUM CHLORIDE 20 MEQ: 1500 TABLET, EXTENDED RELEASE ORAL at 10:19

## 2025-02-14 RX ADMIN — PANTOPRAZOLE SODIUM 40 MG: 40 TABLET, DELAYED RELEASE ORAL at 10:19

## 2025-02-14 NOTE — OCCUPATIONAL THERAPY NOTE
Occupational Therapy Progress Note     Patient Name: Christian Liz  Today's Date: 2/14/2025  Problem List  Principal Problem:    Bleeding duodenal ulcer  Active Problems:    Primary hypertension    Shock (HCC)    Acute blood loss anemia    Sternal manubrial dissociation, closed fracture    Fracture of multiple ribs of both sides with routine healing    Alcohol abuse    Right lower lobe consolidation (HCC)    Acute hypoxic respiratory failure (HCC)    Hypokalemia              02/14/25 1237   OT Last Visit   OT Visit Date 02/14/25   Note Type   Note Type Treatment   Pain Assessment   Pain Assessment Tool 0-10   Pain Score No Pain   Restrictions/Precautions   Weight Bearing Precautions Per Order No   Other Precautions Chair Alarm;Bed Alarm;Multiple lines;O2;Fall Risk  (3L O2 per RN)   Lifestyle   Autonomy I w/ ADLS/IADLS, transfers and functional mobility PTA   Reciprocal Relationships Pt lives w/ his spouse   Service to Others Works as a    Intrinsic Gratification TV   ADL   Where Assessed Chair   Grooming Assistance 5  Supervision/Setup   Grooming Deficit Wash/dry hands;Wash/dry face   LB Dressing Assistance 5  Supervision/Setup   LB Dressing Deficit Don/doff R sock;Don/doff L sock;Increased time to complete   Bed Mobility   Supine to Sit 5  Supervision   Additional items HOB elevated;Bedrails;Increased time required;Verbal cues   Sit to Supine Unable to assess   Additional Comments Pt sit EOB with SUP. OOB in recliner post session, all needs met, call bell within reach. +chair alarm on   Transfers   Sit to Stand 5  Supervision   Additional items Increased time required;Verbal cues   Stand to Sit 5  Supervision   Additional items Increased time required;Verbal cues   Additional Comments RW in stance, VCs for hand placement   Functional Mobility   Functional Mobility 5  Supervision   Additional Comments household distances with rest breaks. Pt O2 drops to 87/88% with functional mobility. VCs for  "breathing to return to 90s. RN updated   Additional items Rolling walker   Subjective   Subjective \"Who knew you could be knocked down like this\"   Cognition   Overall Cognitive Status WFL   Arousal/Participation Alert;Cooperative   Attention Attends with cues to redirect   Orientation Level Oriented X4   Memory Within functional limits   Following Commands Follows one step commands without difficulty   Comments Pt very pleasant and cooperative, motivated to participate.   Activity Tolerance   Activity Tolerance Patient limited by fatigue   Medical Staff Made Aware RN cleared for therapy   Assessment   Assessment Patient participated in Skilled OT session this date with interventions consisting of ADL re training with the use of correct body mechnaics, Energy Conservation techniques, safety awareness and fall prevention techniques,  therapeutic activities to: increase activity tolerance, and increase OOB/ sitting tolerance . Patient agreeable to OT treatment session, upon arrival patient was found supine in bed, alert, responsive , and in no apparent distress.  In comparison to previous session, patient with improvements in ADL completion, functional mobility. Patient requiring frequent rest periods and ocassional safety reminders. Pt completed functional STS txfs and functional mobility with SUP, RW in stance. Pt requires SUP for grooming. SUP for LB dressing. Increased time required. Pt educated on LB dressing and endurance training/compensatory strategies. Pt with good carryover of education.  Patient continues to be functioning below baseline level, occupational performance remains limited secondary to factors listed above and increased risk for falls and injury. The patient's raw score on the AM-PAC Daily Activity Inpatient Short Form is 20. A raw score of greater than or equal to 19 suggests the patient may benefit from discharge to home. Please refer to the recommendation of the Occupational Therapist for " safe discharge planning.  From OT standpoint, recommendation at time of d/c would be Level 3 resources. Patient to benefit from continued Occupational Therapy treatment while in the hospital to address deficits as defined above and maximize level of functional independence with ADLs and functional mobility.   Plan   Treatment Interventions ADL retraining;Functional transfer training;Endurance training;Compensatory technique education;Continued evaluation;Energy conservation;Activityengagement   Goal Expiration Date 02/26/25   OT Treatment Day 1   OT Frequency 2-3x/wk   Discharge Recommendation   Rehab Resource Intensity Level, OT III (Minimum Resource Intensity)   AM-PAC Daily Activity Inpatient   Lower Body Dressing 3   Bathing 3   Toileting 3   Upper Body Dressing 3   Grooming 4   Eating 4   Daily Activity Raw Score 20   Daily Activity Standardized Score (Calc for Raw Score >=11) 42.03   AM-PAC Applied Cognition Inpatient   Following a Speech/Presentation 3   Understanding Ordinary Conversation 4   Taking Medications 4   Remembering Where Things Are Placed or Put Away 4   Remembering List of 4-5 Errands 4   Taking Care of Complicated Tasks 4   Applied Cognition Raw Score 23   Applied Cognition Standardized Score 53.08   End of Consult   Education Provided Yes   Patient Position at End of Consult Bedside chair;Bed/Chair alarm activated;All needs within reach   Nurse Communication Nurse aware of consult         Ralph Hoover MS, OTR/L     [facility] CERTIFIED RATER ID [id number]

## 2025-02-14 NOTE — ASSESSMENT & PLAN NOTE
Patient with hemorrhagic shock requiring levophed upon transfer from [place] to [facility]. S/p fluid and blood resuscitation. Now resolved.

## 2025-02-14 NOTE — ASSESSMENT & PLAN NOTE
Potassium of 3.1 on 2/13, worsened to 2.9 after diuresis.     Plan:  - IV 60 Kcl in the AM, 20 oral Kcl in AM  - 40 Kcl oral in the PM   - Telemetry while greatly hypokalemic  - Continue monitoring on labs

## 2025-02-14 NOTE — ASSESSMENT & PLAN NOTE
Hgb 6.2 on arrival. Acute Blood Loss Anemia 2/2 severe duodenal ulcer with active bleeding, initially presented to [facility] and underwent EGD x3 with refractory bleeding so patient was transferred to [facility] for IR intervention. Code crimson called in IR s/t continued bleeding but able to proceed and s/p embolization of gastroduodenal artery 2/11.     20+ pRBCs received s/p code crimson     Plan:   Maintain 2 large bore IVs  Monitor Hgb and transfuse for Hgb <7 or based on hemodynamics if actively bleeding. Monitor platelets.    Continue to monitor for signs of active bleeding.

## 2025-02-14 NOTE — PHYSICAL THERAPY NOTE
PHYSICAL THERAPY NOTE          Patient Name: Christian Liz  Today's Date: 2/14/2025 02/14/25 1236   PT Last Visit   PT Visit Date 02/14/25   Note Type   Note Type Treatment   Pain Assessment   Pain Assessment Tool 0-10   Pain Score No Pain   Restrictions/Precautions   Weight Bearing Precautions Per Order No   Other Precautions Chair Alarm;Bed Alarm;Multiple lines;O2;Fall Risk  (3L O2 per RN)   General   Chart Reviewed Yes   Family/Caregiver Present No   Cognition   Overall Cognitive Status WFL   Arousal/Participation Alert;Responsive   Attention Attends with cues to redirect   Orientation Level Oriented X4   Following Commands Follows one step commands without difficulty   Comments Pt cooperative during session   Subjective   Subjective Agreeable to mobilize   Bed Mobility   Supine to Sit 5  Supervision   Additional items HOB elevated;Bedrails;Increased time required;Verbal cues   Sit to Supine Unable to assess   Additional Comments Pt in bed upon arrival.   Transfers   Sit to Stand 5  Supervision   Additional items Increased time required;Verbal cues   Stand to Sit 5  Supervision   Additional items Armrests;Verbal cues   Additional Comments w/RW- VCs for hand placement for initial transfers   Ambulation/Elevation   Gait pattern Forward Flexion;Excessively slow   Gait Assistance 5  Supervision   Additional items Verbal cues   Assistive Device Rolling walker   Distance 60 ft + 40 ft X2   Ambulation/Elevation Additional Comments Seated rest breaks in between ambulation bouts but completes with supervision ,using RW for stability at this time  (Pt noted to be on RA upon arrival. Pt states O2 removed by staff this am. SPO2 at 88-89%. Restarted on 2L O2 durign session, sats between 89-90% with ambulation. Per RN post session, pt is to be on 3L O2, increased O2 flow as directed. SPO2 93%.)   Balance   Static Sitting Good   Dynamic  Sitting Fair +   Static Standing Fair   Dynamic Standing Fair -   Ambulatory Fair -   Endurance Deficit   Endurance Deficit Yes   Activity Tolerance   Activity Tolerance Patient limited by fatigue   Medical Staff Made Aware OT Ralph , as pt has decreased activity tolerance   Nurse Made Aware RN cleared pt for therapy   Exercises   Balance training  STS x 4   Assessment   Prognosis Good   Problem List Decreased endurance;Decreased mobility   Assessment Pt seen for PT treatment session this date. Therapy session focused on bed mobility, functional transfers, and ambulation in order to improve overall mobility and independence. Pt completes all mobility tasks with supervision during session, used RW for mobility. Overall making gains with mobility, steady gait, no LOB noted,requires rest breaks 2* fatigue.  Pt making steady progress toward goals. Pt was left in recliner at the end of PT session with all needs in reach. Pt would benefit from continued PT services while in hospital to maximize mobility. The patient's AM-PAC Basic Mobility Inpatient Short Form Raw Score is 17. A Raw score of greater than 16 suggests the patient may benefit from discharge to home. Please also refer to the recommendation of the Physical Therapist for safe discharge planning.  Per pt , will have spouse at home + Dtr currently staying with him, who is a PT and able to assist as needed. Pt plans to have FFSU with 0STE.  Post dc recommendation is Level III with increased caregiver support, pending level of assist available at home.   Goals   Patient Goals to go home   STG Expiration Date 02/26/25   PT Treatment Day 2   Plan   Treatment/Interventions Functional transfer training;Therapeutic exercise;Spoke to nursing;Spoke to case management;OT;Gait training;Elevations;Bed mobility   Progress Progressing toward goals   PT Frequency 3-5x/wk   Discharge Recommendation   Rehab Resource Intensity Level, PT III (Minimum Resource Intensity)  (pt  states will have FFSU with 0STE + Dtr staying with him and spouse , who is a PT herself and can assist as needed)   Equipment Recommended Walker   AM-PAC Basic Mobility Inpatient   Turning in Flat Bed Without Bedrails 3   Lying on Back to Sitting on Edge of Flat Bed Without Bedrails 3   Moving Bed to Chair 3   Standing Up From Chair Using Arms 3   Walk in Room 3   Climb 3-5 Stairs With Railing 2   Basic Mobility Inpatient Raw Score 17   Basic Mobility Standardized Score 39.67   [facility] Highest Level Of Mobility   -HLM Goal 5: Stand one or more mins   -HLM Achieved 7: Walk 25 feet or more   Education   Education Provided Mobility training;Assistive device   Patient Reinforcement needed;Demonstrates verbal understanding   End of Consult   Patient Position at End of Consult All needs within reach;Bed/Chair alarm activated;Bedside chair   Meli Gibson PT DPT

## 2025-02-14 NOTE — ASSESSMENT & PLAN NOTE
Patient initially presented to [facility] and underwent EGD x3 with refractory bleeding so patient was transferred to hospitals for IR intervention. Code crimson called in IR s/t continued bleeding but able to proceed and s/p embolization of gastroduodenal artery 2/11.    Plan:  GI and IR consulted and following  Given stable Hbg and vitals, GI suggesting deferring additional EGD, possible discharge tomorrow if patient remains stable  Continue IV PPI   Monitor Hgb and transfuse for Hgb <7 or based on hemodynamics if actively bleeding. Monitor platelets.    Continue to monitor for signs of active bleeding.

## 2025-02-14 NOTE — ASSESSMENT & PLAN NOTE
"CTAP 2/7:\"Focal grouping of nodularity in the left lower lobe consistent with an infectious/inflammatory infiltrate.Heterogeneous density within the stomach and duodenum may represent hemorrhage considering clinical history. No active arterial extravasation or focal wall abnormality identified. No free air or adjacent fluid collection. Multifocal right greater than left anterior subacute/healing rib fractures. Healing subacute manubrial fracture.\"    COVID, flu, RSV negative.  Leigonella and strep pneumo antigen negative.  H pylori stool antigen negative.  Procal 0.55 on 2/11.     Patient with RLL consolidation thought to be pneumonia and pt treated with 5 days of Ceftriaxone at [facility]. Other differentials include abscess as seen on CT chest imaging as well as possible hematoma from recent rib fracture and pulmonary laceration.    Also met criteria for sepsis due to RLL PNA a/e/b tachycardia (HR ), hypothermia (T 95.7 F) treated with IV Rocephin, treatment completed.     Plan:  Ceftriaxone completed, continue to monitor for signs of infection  Respiratory protocol, continuous pulse oximetry.   Incentive spirometry. Pulmonary toilet. Maintain O2 sat >90%.  Supplemental O2: 6L NC during prior hospitalization. Wean as tolerated.    "

## 2025-02-14 NOTE — PLAN OF CARE
Problem: OCCUPATIONAL THERAPY ADULT  Goal: Performs self-care activities at highest level of function for planned discharge setting.  See evaluation for individualized goals.  Description: Treatment Interventions: ADL retraining, Functional transfer training, UE strengthening/ROM, Endurance training, Cognitive reorientation, Patient/family training, Equipment evaluation/education, Compensatory technique education, Continued evaluation, Energy conservation, Activityengagement          See flowsheet documentation for full assessment, interventions and recommendations.   Note: Limitation: Decreased ADL status, Decreased endurance, Decreased self-care trans, Decreased high-level ADLs  Prognosis: Fair  Assessment: Patient participated in Skilled OT session this date with interventions consisting of ADL re training with the use of correct body mechnaics, Energy Conservation techniques, safety awareness and fall prevention techniques,  therapeutic activities to: increase activity tolerance, and increase OOB/ sitting tolerance . Patient agreeable to OT treatment session, upon arrival patient was found supine in bed, alert, responsive , and in no apparent distress.  In comparison to previous session, patient with improvements in ADL completion, functional mobility. Patient requiring frequent rest periods and ocassional safety reminders. Pt completed functional STS txfs and functional mobility with SUP, RW in stance. Pt requires SUP for grooming. SUP for LB dressing. Increased time required. Pt educated on LB dressing and endurance training/compensatory strategies. Pt with good carryover of education.  Patient continues to be functioning below baseline level, occupational performance remains limited secondary to factors listed above and increased risk for falls and injury. The patient's raw score on the -PAC Daily Activity Inpatient Short Form is 20. A raw score of greater than or equal to 19 suggests the patient may  benefit from discharge to home. Please refer to the recommendation of the Occupational Therapist for safe discharge planning.  From OT standpoint, recommendation at time of d/c would be Level 3 resources. Patient to benefit from continued Occupational Therapy treatment while in the hospital to address deficits as defined above and maximize level of functional independence with ADLs and functional mobility.     Rehab Resource Intensity Level, OT: III (Minimum Resource Intensity)

## 2025-02-14 NOTE — QUICK NOTE
Care team reached out to Miguel Liz, the patient's son to inform him about current treatment plan and prognosis. The patient's son was informed about current efforts to diurese the patient and improve his oxygenation. Questions about timeline for discharge were answered. Care team will continue to keep family updated.

## 2025-02-14 NOTE — PLAN OF CARE
Problem: PHYSICAL THERAPY ADULT  Goal: Performs mobility at highest level of function for planned discharge setting.  See evaluation for individualized goals.  Description: Treatment/Interventions: ADL retraining, Functional transfer training, LE strengthening/ROM, Elevations, Therapeutic exercise, Endurance training, Patient/family training, Equipment eval/education, Bed mobility, Gait training, Compensatory technique education, Spoke to nursing, Spoke to case management, Spoke to advanced practitioner, OT  Equipment Recommended:  (TBD)       See flowsheet documentation for full assessment, interventions and recommendations.  Outcome: Progressing  Note: Prognosis: Good  Problem List: Decreased endurance, Decreased mobility  Assessment: Pt seen for PT treatment session this date. Therapy session focused on bed mobility, functional transfers, and ambulation in order to improve overall mobility and independence. Pt completes all mobility tasks with supervision during session, used RW for mobility. Overall making gains with mobility, steady gait, no LOB noted,requires rest breaks 2* fatigue.  Pt making steady progress toward goals. Pt was left in recliner at the end of PT session with all needs in reach. Pt would benefit from continued PT services while in hospital to maximize mobility. The patient's AM-PAC Basic Mobility Inpatient Short Form Raw Score is 17. A Raw score of greater than 16 suggests the patient may benefit from discharge to home. Please also refer to the recommendation of the Physical Therapist for safe discharge planning.  Per pt , will have spouse at home + Dtr currently staying with him, who is a PT and able to assist as needed. Pt plans to have FFSU with 0STE.  Post dc recommendation is Level III with increased caregiver support, pending level of assist available at home.        Rehab Resource Intensity Level, PT: III (Minimum Resource Intensity) (pt states will have FFSU with 0STE + Dtr staying  with him and spouse , who is a PT herself and can assist as needed)    See flowsheet documentation for full assessment.

## 2025-02-14 NOTE — ASSESSMENT & PLAN NOTE
Patient complaining of new shortness of breath overnight, on 3L NC at this time, previously on 6L prior to intubation, s/p 20+ pRBC. Echo on 2/11 unremarkable. CXR on 2/11 showed evidence of BL pleural effusions. PhE showed evidence on 2/13 of right sided basilar rales, upper and lower extremity swelling, likely in setting of massive fluid supplementation. I/O - 6.4L in last 24 hours in response to lasix 40.     Plan:  - S/p lasix on 2/13 with improvement in edema, lasix 40 mg again today  - Reportedly still symptomatic, continue with diuresis and reevaluate  - Monitor I/O   - Daily weights  - Electrolytes repleted
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
## 2025-02-14 NOTE — ASSESSMENT & PLAN NOTE
Home: valsartan-hydrochlorothiazide (DIOVAN-HCT) 160-25 MG per tablet     Plan:  Holding at this time

## 2025-02-15 ENCOUNTER — APPOINTMENT (INPATIENT)
Dept: RADIOLOGY | Facility: HOSPITAL | Age: 65
DRG: 853 | End: 2025-02-15
Payer: COMMERCIAL

## 2025-02-15 PROBLEM — D62 ACUTE BLOOD LOSS ANEMIA: Status: RESOLVED | Noted: 2025-02-07 | Resolved: 2025-02-15

## 2025-02-15 PROBLEM — R57.9 SHOCK (HCC): Status: RESOLVED | Noted: 2025-02-07 | Resolved: 2025-02-15

## 2025-02-15 PROBLEM — J90 PLEURAL EFFUSION ON RIGHT: Status: ACTIVE | Noted: 2025-02-15

## 2025-02-15 LAB
ANION GAP SERPL CALCULATED.3IONS-SCNC: 5 MMOL/L (ref 4–13)
BASOPHILS # BLD AUTO: 0.04 THOUSANDS/ΜL (ref 0–0.1)
BASOPHILS NFR BLD AUTO: 0 % (ref 0–1)
BUN SERPL-MCNC: 10 MG/DL (ref 5–25)
CALCIUM SERPL-MCNC: 8.2 MG/DL (ref 8.4–10.2)
CHLORIDE SERPL-SCNC: 97 MMOL/L (ref 96–108)
CO2 SERPL-SCNC: 32 MMOL/L (ref 21–32)
CREAT SERPL-MCNC: 0.39 MG/DL (ref 0.6–1.3)
EOSINOPHIL # BLD AUTO: 0.28 THOUSAND/ΜL (ref 0–0.61)
EOSINOPHIL NFR BLD AUTO: 2 % (ref 0–6)
ERYTHROCYTE [DISTWIDTH] IN BLOOD BY AUTOMATED COUNT: 15.3 % (ref 11.6–15.1)
GFR SERPL CREATININE-BSD FRML MDRD: 126 ML/MIN/1.73SQ M
GLUCOSE SERPL-MCNC: 89 MG/DL (ref 65–140)
HCT VFR BLD AUTO: 40.3 % (ref 36.5–49.3)
HGB BLD-MCNC: 13.8 G/DL (ref 12–17)
IMM GRANULOCYTES # BLD AUTO: 0.12 THOUSAND/UL (ref 0–0.2)
IMM GRANULOCYTES NFR BLD AUTO: 1 % (ref 0–2)
LYMPHOCYTES # BLD AUTO: 0.94 THOUSANDS/ΜL (ref 0.6–4.47)
LYMPHOCYTES NFR BLD AUTO: 7 % (ref 14–44)
MAGNESIUM SERPL-MCNC: 2 MG/DL (ref 1.9–2.7)
MCH RBC QN AUTO: 29.6 PG (ref 26.8–34.3)
MCHC RBC AUTO-ENTMCNC: 34.2 G/DL (ref 31.4–37.4)
MCV RBC AUTO: 87 FL (ref 82–98)
MONOCYTES # BLD AUTO: 1.3 THOUSAND/ΜL (ref 0.17–1.22)
MONOCYTES NFR BLD AUTO: 9 % (ref 4–12)
NEUTROPHILS # BLD AUTO: 11.4 THOUSANDS/ΜL (ref 1.85–7.62)
NEUTS SEG NFR BLD AUTO: 81 % (ref 43–75)
NRBC BLD AUTO-RTO: 0 /100 WBCS
PLATELET # BLD AUTO: 278 THOUSANDS/UL (ref 149–390)
PMV BLD AUTO: 9.7 FL (ref 8.9–12.7)
POTASSIUM SERPL-SCNC: 4.1 MMOL/L (ref 3.5–5.3)
RBC # BLD AUTO: 4.66 MILLION/UL (ref 3.88–5.62)
SODIUM SERPL-SCNC: 134 MMOL/L (ref 135–147)
WBC # BLD AUTO: 14.08 THOUSAND/UL (ref 4.31–10.16)

## 2025-02-15 PROCEDURE — 80048 BASIC METABOLIC PNL TOTAL CA: CPT

## 2025-02-15 PROCEDURE — 99232 SBSQ HOSP IP/OBS MODERATE 35: CPT | Performed by: INTERNAL MEDICINE

## 2025-02-15 PROCEDURE — 85025 COMPLETE CBC W/AUTO DIFF WBC: CPT

## 2025-02-15 PROCEDURE — 83735 ASSAY OF MAGNESIUM: CPT

## 2025-02-15 PROCEDURE — 71046 X-RAY EXAM CHEST 2 VIEWS: CPT

## 2025-02-15 PROCEDURE — 99223 1ST HOSP IP/OBS HIGH 75: CPT | Performed by: INTERNAL MEDICINE

## 2025-02-15 RX ADMIN — CHLORHEXIDINE GLUCONATE 0.12% ORAL RINSE 15 ML: 1.2 LIQUID ORAL at 21:11

## 2025-02-15 RX ADMIN — PANTOPRAZOLE SODIUM 40 MG: 40 TABLET, DELAYED RELEASE ORAL at 18:07

## 2025-02-15 RX ADMIN — PANTOPRAZOLE SODIUM 40 MG: 40 TABLET, DELAYED RELEASE ORAL at 05:19

## 2025-02-15 RX ADMIN — CHLORHEXIDINE GLUCONATE 0.12% ORAL RINSE 15 ML: 1.2 LIQUID ORAL at 10:16

## 2025-02-15 RX ADMIN — THIAMINE HYDROCHLORIDE: 100 INJECTION, SOLUTION INTRAMUSCULAR; INTRAVENOUS at 10:16

## 2025-02-15 NOTE — ASSESSMENT & PLAN NOTE
Hgb 6.2 on admission. Acute Blood Loss Anemia 2/2 severe duodenal ulcer with active bleeding, initially presented to [facility] and underwent EGD x3 with refractory bleeding so patient was transferred to [facility] for IR intervention. Code crimson called in IR s/t continued bleeding but able to proceed and s/p embolization of gastroduodenal artery 2/11.     20+ pRBCs received s/p code crimson    Plan:  GI and IR consulted and following  Given stable Hbg and vitals, GI suggesting deferring additional EGD,   Continue on po protonix 40mg bid  Monitor Hgb and transfuse for Hgb <7 or based on hemodynamics if actively bleeding. Monitor platelets.    Continue to monitor for signs of active bleeding.    Holding dvt ppx for now given previous GI bleed / possible concerns for thoracentesis as below, readdress 2/16

## 2025-02-15 NOTE — QUICK NOTE
Called patients son, updating them about ongoing treatment plan and any patient updates.      Jennie Brown,   Internal Medicine Residency PGY-2  [facility], [place]  Available on InterviewBestt

## 2025-02-15 NOTE — ASSESSMENT & PLAN NOTE
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

## 2025-02-15 NOTE — ASSESSMENT & PLAN NOTE
Likely secondary to volume overload in setting of cirrhosis and need for multiple transfusions for severe GI bleed  Wean oxygen for goal sats >92%, continue diuresis with lasix

## 2025-02-15 NOTE — ASSESSMENT & PLAN NOTE
Repeat cxr 2/15 showing continued right pleural effusion, pulm consulted for possible thoracentesis vs chest tube. F/u recs.

## 2025-02-15 NOTE — ASSESSMENT & PLAN NOTE
Hgb 6.2 on arrival. Acute Blood Loss Anemia 2/2 severe duodenal ulcer with active bleeding, initially presented to [facility] and underwent EGD x3 with refractory bleeding so patient was transferred to [facility] for IR intervention. Code crimson called in IR s/t continued bleeding but able to proceed and s/p embolization of gastroduodenal artery 2/11.     20+ pRBCs received s/p code crimson     Plan:   Maintain 2 large bore IVs  Monitor Hgb and transfuse for Hgb <7 or based on hemodynamics if actively bleeding. Monitor platelets.    Continue to monitor for signs of active bleeding.

## 2025-02-15 NOTE — ASSESSMENT & PLAN NOTE
Patient complaining of new shortness of breath overnight, on 3L NC at this time, previously on 6L prior to intubation, s/p 20+ pRBC. Echo on 2/11 unremarkable. CXR on 2/11 showed evidence of BL pleural effusions. PhE showed evidence on 2/13 of right sided basilar rales, upper and lower extremity swelling, likely in setting of massive fluid supplementation. I/O - 6.4L in last 24 hours in response to lasix 40.     Plan:  - S/p lasix on 2/13 and 2/14 with improvement in edema   - Reportedly still symptomatic, continue with diuresis and reevaluate  - Monitor I/O   - Daily weights  - Electrolytes repleted
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
## 2025-02-15 NOTE — CONSULTS
Consultation - Pulmonology   Name: Christian Liz 64 y.o. 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 male who presents with massive GI bleed requiring approximately significant transfusion.  He was initially admitted to the ICU for resuscitation and eventually improved to the point of being tolerated to the floor.  On the floor he continued to have some hypoxic respiratory failure requiring supplemental oxygen which he does not use at home.  Pulmonology was consulted today given he continued to require oxygen and chest x-ray showed worsening to stable effusion on the right side.    He is doing okay today and denies any new symptoms.  He denies any chest pain or worsening cough.  Denies any fevers or chills.  Discussed that the fluid may likely be result of his cirrhosis.  Formed bedside ultrasound and did show small pocket of fluid on the right side of simple and without septations.  Given that the most likely cause of this is hepatic hydrothorax will attempt diuresis today and if worsening can  consider thoracentesis at that time.  While he parapneumonic fusion is not entirely out of the differential appears much less likely given he does not have signs of pneumonia at this time.  Will continue to monitor and follow-up tomorrow to see if there are any changes.    Review of Systems   Constitutional:  Positive for fatigue. Negative for chills and fever.   HENT:  Negative for congestion and sinus pressure.    Respiratory:  Negative for cough, choking, shortness of breath and wheezing.    Cardiovascular:  Negative for chest pain, palpitations and leg swelling.   Gastrointestinal:  Negative for nausea and vomiting.   Neurological:  Negative for dizziness and light-headedness.       Historical Information   Medical History Review: I have reviewed the patient's PMH, PSH, Social History, Family History, Meds, and Allergies   Tobacco History: current 1/4 ppd smoker  Occupational History: non-contributory   Family History:non-contributory    Objective :  Temp:  [97.9 °F (36.6 °C)-99.1 °F (37.3 °C)] 97.9 °F (36.6 °C)  HR:  [] 82  BP: (141-159)/() 151/93  Resp:  [16-24] 16  SpO2:  [87 %-95 %] 91 %  O2 Device: Nasal cannula    Physical Exam  Vitals reviewed.   Constitutional:       General: He is not in acute distress.     Appearance: He is not toxic-appearing.   Cardiovascular:      Rate and Rhythm: Normal rate and regular rhythm.   Pulmonary:      Effort: Pulmonary effort is normal. No respiratory distress.      Breath sounds: No wheezing or rales.      Comments: Diminished breath sounds throughout  Abdominal:      Palpations: Abdomen is soft.      Tenderness: There is no abdominal tenderness.   Musculoskeletal:      Right lower leg: No edema.      Left lower leg: No edema.   Skin:     Coloration: Skin is not jaundiced.   Neurological:      General: No focal deficit present.      Mental Status: He is alert and oriented to person, place, and time.           Lab Results: I have reviewed the following  results:  .     02/15/25  0608   WBC 14.08*   HGB 13.8   HCT 40.3      SODIUM 134*   K 4.1   CL 97   CO2 32   BUN 10   CREATININE 0.39*   GLUC 89   MG 2.0     ABG: No new results in last 24 hours.    Imaging Results Review: I personally reviewed the following image studies in PACS and associated radiology reports: chest xray. My interpretation of the radiology images/reports is: increasing R pleural effusion but otherwise clear lung fields.  Other Study Results Review: EKG was reviewed.  Other studies reviewed include: TTE 2/11/25 with normal EF 60%, normal diastolic function    VTE Prophylaxis: VTE covered by:    None

## 2025-02-15 NOTE — ASSESSMENT & PLAN NOTE
"CTAP 2/7:\"Focal grouping of nodularity in the left lower lobe consistent with an infectious/inflammatory infiltrate.Heterogeneous density within the stomach and duodenum may represent hemorrhage considering clinical history. No active arterial extravasation or focal wall abnormality identified. No free air or adjacent fluid collection. Multifocal right greater than left anterior subacute/healing rib fractures. Healing subacute manubrial fracture.\"    COVID, flu, RSV negative.  Leigonella and strep pneumo antigen negative.  H pylori stool antigen negative.  Procal 0.55 on 2/11.     Patient with RLL consolidation thought to be pneumonia and pt treated with 5 days of Ceftriaxone at [facility]. Other differentials include abscess as seen on CT chest imaging as well as possible hematoma from recent rib fracture and pulmonary laceration.    Also met criteria for sepsis due to RLL PNA a/e/b tachycardia (HR ), hypothermia (T 95.7 F) treated with IV Rocephin, treatment completed.     Plan:  Ceftriaxone completed, continue to monitor for signs of infection  Respiratory protocol, continuous pulse oximetry.   Incentive spirometry. Pulmonary toilet. Maintain O2 sat >90%.  Supplemental O2: 6L NC during prior hospitalization. Wean as tolerated.    "

## 2025-02-15 NOTE — ASSESSMENT & PLAN NOTE
Patient with hemorrhagic shock requiring levophed upon transfer from [facility] to [facility]. S/p fluid and blood resuscitation. Now resolved.

## 2025-02-16 VITALS
TEMPERATURE: 99.5 F | SYSTOLIC BLOOD PRESSURE: 133 MMHG | BODY MASS INDEX: 23.61 KG/M2 | HEIGHT: 69 IN | HEART RATE: 111 BPM | RESPIRATION RATE: 22 BRPM | WEIGHT: 159.39 LBS | OXYGEN SATURATION: 92 % | DIASTOLIC BLOOD PRESSURE: 89 MMHG

## 2025-02-16 PROBLEM — E87.6 HYPOKALEMIA: Status: RESOLVED | Noted: 2025-02-14 | Resolved: 2025-02-16

## 2025-02-16 PROBLEM — K26.4 BLEEDING DUODENAL ULCER: Status: RESOLVED | Noted: 2025-02-10 | Resolved: 2025-02-16

## 2025-02-16 PROBLEM — J96.01 ACUTE HYPOXIC RESPIRATORY FAILURE (HCC): Status: RESOLVED | Noted: 2025-02-13 | Resolved: 2025-02-16

## 2025-02-16 PROBLEM — J18.1 RIGHT LOWER LOBE CONSOLIDATION (HCC): Status: RESOLVED | Noted: 2025-02-12 | Resolved: 2025-02-16

## 2025-02-16 LAB
ANION GAP SERPL CALCULATED.3IONS-SCNC: 5 MMOL/L (ref 4–13)
ATRIAL RATE: 127 BPM
BASOPHILS # BLD AUTO: 0.07 THOUSANDS/ΜL (ref 0–0.1)
BASOPHILS NFR BLD AUTO: 1 % (ref 0–1)
BUN SERPL-MCNC: 28 MG/DL (ref 5–25)
CALCIUM SERPL-MCNC: 8.2 MG/DL (ref 8.4–10.2)
CHLORIDE SERPL-SCNC: 101 MMOL/L (ref 96–108)
CO2 SERPL-SCNC: 29 MMOL/L (ref 21–32)
CREAT SERPL-MCNC: 0.42 MG/DL (ref 0.6–1.3)
DME PARACHUTE DELIVERY DATE REQUESTED: NORMAL
DME PARACHUTE ITEM DESCRIPTION: NORMAL
DME PARACHUTE ORDER STATUS: NORMAL
DME PARACHUTE SUPPLIER NAME: NORMAL
DME PARACHUTE SUPPLIER PHONE: NORMAL
EOSINOPHIL # BLD AUTO: 0.31 THOUSAND/ΜL (ref 0–0.61)
EOSINOPHIL NFR BLD AUTO: 3 % (ref 0–6)
ERYTHROCYTE [DISTWIDTH] IN BLOOD BY AUTOMATED COUNT: 15.2 % (ref 11.6–15.1)
GFR SERPL CREATININE-BSD FRML MDRD: 123 ML/MIN/1.73SQ M
GLUCOSE SERPL-MCNC: 95 MG/DL (ref 65–140)
HCT VFR BLD AUTO: 41.1 % (ref 36.5–49.3)
HGB BLD-MCNC: 13.7 G/DL (ref 12–17)
IMM GRANULOCYTES # BLD AUTO: 0.08 THOUSAND/UL (ref 0–0.2)
IMM GRANULOCYTES NFR BLD AUTO: 1 % (ref 0–2)
LYMPHOCYTES # BLD AUTO: 1.04 THOUSANDS/ΜL (ref 0.6–4.47)
LYMPHOCYTES NFR BLD AUTO: 9 % (ref 14–44)
MCH RBC QN AUTO: 29.8 PG (ref 26.8–34.3)
MCHC RBC AUTO-ENTMCNC: 33.3 G/DL (ref 31.4–37.4)
MCV RBC AUTO: 90 FL (ref 82–98)
MONOCYTES # BLD AUTO: 0.89 THOUSAND/ΜL (ref 0.17–1.22)
MONOCYTES NFR BLD AUTO: 8 % (ref 4–12)
NEUTROPHILS # BLD AUTO: 9.38 THOUSANDS/ΜL (ref 1.85–7.62)
NEUTS SEG NFR BLD AUTO: 78 % (ref 43–75)
NRBC BLD AUTO-RTO: 0 /100 WBCS
P AXIS: 74 DEGREES
PLATELET # BLD AUTO: 299 THOUSANDS/UL (ref 149–390)
PMV BLD AUTO: 9.4 FL (ref 8.9–12.7)
POTASSIUM SERPL-SCNC: 4.3 MMOL/L (ref 3.5–5.3)
PR INTERVAL: 138 MS
QRS AXIS: 48 DEGREES
QRSD INTERVAL: 80 MS
QT INTERVAL: 306 MS
QTC INTERVAL: 445 MS
RBC # BLD AUTO: 4.59 MILLION/UL (ref 3.88–5.62)
SODIUM SERPL-SCNC: 135 MMOL/L (ref 135–147)
T WAVE AXIS: 56 DEGREES
VENTRICULAR RATE: 127 BPM
WBC # BLD AUTO: 11.77 THOUSAND/UL (ref 4.31–10.16)

## 2025-02-16 PROCEDURE — 93005 ELECTROCARDIOGRAM TRACING: CPT

## 2025-02-16 PROCEDURE — 80048 BASIC METABOLIC PNL TOTAL CA: CPT

## 2025-02-16 PROCEDURE — 99238 HOSP IP/OBS DSCHRG MGMT 30/<: CPT | Performed by: INTERNAL MEDICINE

## 2025-02-16 PROCEDURE — 93010 ELECTROCARDIOGRAM REPORT: CPT | Performed by: INTERNAL MEDICINE

## 2025-02-16 PROCEDURE — 85025 COMPLETE CBC W/AUTO DIFF WBC: CPT

## 2025-02-16 RX ORDER — PANTOPRAZOLE SODIUM 40 MG/1
40 TABLET, DELAYED RELEASE ORAL
Qty: 60 TABLET | Refills: 0 | Status: SHIPPED | OUTPATIENT
Start: 2025-02-16

## 2025-02-16 RX ORDER — SILVER
1 GEL, EXTENDED RELEASE (ML) TOPICAL DAILY
Qty: 44.4 ML | Refills: 0 | Status: SHIPPED | OUTPATIENT
Start: 2025-02-16

## 2025-02-16 RX ADMIN — PANTOPRAZOLE SODIUM 40 MG: 40 TABLET, DELAYED RELEASE ORAL at 16:05

## 2025-02-16 RX ADMIN — CHLORHEXIDINE GLUCONATE 0.12% ORAL RINSE 15 ML: 1.2 LIQUID ORAL at 09:34

## 2025-02-16 RX ADMIN — PANTOPRAZOLE SODIUM 40 MG: 40 TABLET, DELAYED RELEASE ORAL at 05:34

## 2025-02-16 RX ADMIN — THIAMINE HYDROCHLORIDE: 100 INJECTION, SOLUTION INTRAMUSCULAR; INTRAVENOUS at 09:36

## 2025-02-16 NOTE — DISCHARGE INSTR - AVS FIRST PAGE
Please follow-up with PCP and Gastroenterology in 2 weeks. Please call and make an appointment. Additionally, obtain follow-up lab work in one week.    Please take PPI twice daily for two weeks, then once daily for six weeks. Go to the emergency department if new blood in stool, lightheadedness, dizziness, overtly dark stool.    Keep suture sites dry and clean. Wash 1-2 times per day with soap and water, drying immediately after.

## 2025-02-16 NOTE — CASE MANAGEMENT
Case Management Discharge Planning Note    Patient name Christian Liz  Location [facility] 833/[facility] 833-01 MRN [id number]  : 1960 Date 2025       Current Admission Date: 2/10/2025  Current Admission Diagnosis:Alcohol abuse   Patient Active Problem List    Diagnosis Date Noted Date Diagnosed    Pleural effusion on right 02/15/2025     Colon cancer screening 2025     Alcohol abuse 2025     Primary hypertension 2025     Hyponatremia 2025     Sternal manubrial dissociation, closed fracture 2025     Fracture of multiple ribs of both sides with routine healing 2025     Abnormal findings on imaging test 2025       LOS (days): 6  Geometric Mean LOS (GMLOS) (days): 9.6  Days to GMLOS:3.7     OBJECTIVE:  Risk of Unplanned Readmission Score: 12.98         Current admission status: Inpatient   Preferred Pharmacy:   [facility] - [address]  [address] [address]  Phone: [phone] Fax: [phone]    Primary Care Provider: Gaurav Lucia MD    Primary Insurance: [facility]  Secondary Insurance:     DISCHARGE DETAILS:                                     DME Referral Provided  Referral made for DME?: Yes  DME referral completed for the following items:: Walker  DME Supplier Name:: [employer]            Per provider, not eligible for home O2- cleared for d/c   Pt requesting walker for d/c.,   Order placed via parachute, walker delivered to bedside

## 2025-02-16 NOTE — CASE MANAGEMENT
Case Management Discharge Planning Note    Patient name Christian Liz  Location [facility] 833/[facility] 833-01 MRN [id number]  : 1960 Date 2025       Current Admission Date: 2/10/2025  Current Admission Diagnosis:Bleeding duodenal ulcer   Patient Active Problem List    Diagnosis Date Noted Date Diagnosed    Pleural effusion on right 02/15/2025     Hypokalemia 2025     Acute hypoxic respiratory failure (HCC) 2025     Right lower lobe consolidation (HCC) 2025     Bleeding duodenal ulcer 02/10/2025     Colon cancer screening 2025     Alcohol abuse 2025     Primary hypertension 2025     Hyponatremia 2025     Sternal manubrial dissociation, closed fracture 2025     Fracture of multiple ribs of both sides with routine healing 2025     Abnormal findings on imaging test 2025       LOS (days): 6  Geometric Mean LOS (GMLOS) (days): 9.6  Days to GMLOS:3.8     OBJECTIVE:  Risk of Unplanned Readmission Score: 12.98         Current admission status: Inpatient   Preferred Pharmacy:   [facility] - [address]  [address] [address]  Phone: [phone] Fax: [phone]    Primary Care Provider: Gaurav Lucia MD    Primary Insurance: [facility] [employer]  Secondary Insurance:     DISCHARGE DETAILS:     Informed by provider, pt will need home o2 eval  Will await eval results
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
## 2025-02-17 ENCOUNTER — TELEPHONE (OUTPATIENT)
Age: 65
End: 2025-02-17

## 2025-02-17 NOTE — TELEPHONE ENCOUNTER
Patient's daughter, Vannesa, called to schedule new patient appointment stating patient was recently discharged from hospital.    Mentioned that patient may receive a return call regarding TCM.

## 2025-02-17 NOTE — UTILIZATION REVIEW
NOTIFICATION OF ADMISSION DISCHARGE   This is a Notification of Discharge from [facility]. Please be advised that this patient has been discharge from our facility. Below you will find the admission and discharge date and time including the patient’s disposition.   UTILIZATION REVIEW CONTACT:  Earnest Lynne  Utilization   Network Utilization Review Department  Phone: [phone] x carefully listen to the prompts. All voicemails are confidential.  Email: [email or web address]     ADMISSION INFORMATION  PRESENTATION DATE: 2/10/2025  5:23 PM  OBERVATION ADMISSION DATE: N/A  INPATIENT ADMISSION DATE: 2/10/25  5:23 PM   DISCHARGE DATE: 2/16/2025  5:40 PM   DISPOSITION:Home/Self Care    Network Utilization Review Department  ATTENTION: Please call with any questions or concerns to [phone] and carefully listen to the prompts so that you are directed to the right person. All voicemails are confidential.   For Discharge needs, contact Care Management DC Support Team at [phone] opt. 2  Send all requests for admission clinical reviews, approved or denied determinations and any other requests to dedicated fax number below belonging to the campus where the patient is receiving treatment. List of dedicated fax numbers for the Facilities:  FACILITY NAME UR FAX NUMBER   ADMISSION DENIALS (Administrative/Medical Necessity) [phone]   DISCHARGE SUPPORT TEAM (Health system) [phone]   PARENT CHILD HEALTH (Maternity/NICU/Pediatrics) [phone]   [facility] [phone]   [facility] [phone]   [facility] [phone]   [facility] [phone]   [facility] [phone]   [facility] [phone]   [facility] [phone]   [facility] [phone]   [facility]  [facility] [phone]   [facility] [phone]   [facility] [phone]   [facility] [phone]

## 2025-02-19 ENCOUNTER — OFFICE VISIT (OUTPATIENT)
Dept: FAMILY MEDICINE CLINIC | Facility: CLINIC | Age: 65
End: 2025-02-19
Payer: COMMERCIAL

## 2025-02-19 VITALS
SYSTOLIC BLOOD PRESSURE: 112 MMHG | BODY MASS INDEX: 17.92 KG/M2 | RESPIRATION RATE: 18 BRPM | TEMPERATURE: 97.8 F | DIASTOLIC BLOOD PRESSURE: 64 MMHG | HEIGHT: 69 IN | WEIGHT: 121 LBS

## 2025-02-19 DIAGNOSIS — D64.9 ANEMIA, UNSPECIFIED TYPE: ICD-10-CM

## 2025-02-19 DIAGNOSIS — R91.8 RIGHT LOWER LOBE LUNG MASS: ICD-10-CM

## 2025-02-19 DIAGNOSIS — S22.23XS: ICD-10-CM

## 2025-02-19 DIAGNOSIS — L03.115 CELLULITIS OF RIGHT LOWER EXTREMITY: ICD-10-CM

## 2025-02-19 DIAGNOSIS — T14.8XXA OPEN WOUND OF SKIN: Primary | ICD-10-CM

## 2025-02-19 PROCEDURE — 99496 TRANSJ CARE MGMT HIGH F2F 7D: CPT | Performed by: NURSE PRACTITIONER

## 2025-02-19 PROCEDURE — 99204 OFFICE O/P NEW MOD 45 MIN: CPT | Performed by: NURSE PRACTITIONER

## 2025-02-19 RX ORDER — TRAMADOL HYDROCHLORIDE 50 MG/1
1 TABLET ORAL 4 TIMES DAILY
COMMUNITY
Start: 2025-01-18 | End: 2025-02-19

## 2025-02-19 RX ORDER — METHOCARBAMOL 500 MG/1
TABLET, FILM COATED ORAL
COMMUNITY
Start: 2025-01-13 | End: 2025-02-19
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
## 2025-02-20 ENCOUNTER — HOME HEALTH ADMISSION (OUTPATIENT)
Dept: HOME HEALTH SERVICES | Facility: HOME HEALTHCARE | Age: 65
End: 2025-02-20
Payer: COMMERCIAL

## 2025-02-21 ENCOUNTER — HOME CARE VISIT (OUTPATIENT)
Dept: HOME HEALTH SERVICES | Facility: HOME HEALTHCARE | Age: 65
End: 2025-02-21
Payer: COMMERCIAL

## 2025-02-21 ENCOUNTER — APPOINTMENT (OUTPATIENT)
Dept: LAB | Facility: CLINIC | Age: 65
End: 2025-02-21
Payer: COMMERCIAL

## 2025-02-21 VITALS
RESPIRATION RATE: 16 BRPM | TEMPERATURE: 97.2 F | DIASTOLIC BLOOD PRESSURE: 58 MMHG | OXYGEN SATURATION: 100 % | SYSTOLIC BLOOD PRESSURE: 110 MMHG | HEART RATE: 80 BPM

## 2025-02-21 DIAGNOSIS — K26.4 BLEEDING DUODENAL ULCER: ICD-10-CM

## 2025-02-21 LAB
ANION GAP SERPL CALCULATED.3IONS-SCNC: 9 MMOL/L (ref 4–13)
BASOPHILS # BLD AUTO: 0.08 THOUSANDS/ΜL (ref 0–0.1)
BASOPHILS NFR BLD AUTO: 1 % (ref 0–1)
BUN SERPL-MCNC: 16 MG/DL (ref 5–25)
CALCIUM SERPL-MCNC: 8.7 MG/DL (ref 8.4–10.2)
CHLORIDE SERPL-SCNC: 101 MMOL/L (ref 96–108)
CO2 SERPL-SCNC: 27 MMOL/L (ref 21–32)
CREAT SERPL-MCNC: 0.53 MG/DL (ref 0.6–1.3)
EOSINOPHIL # BLD AUTO: 0.3 THOUSAND/ΜL (ref 0–0.61)
EOSINOPHIL NFR BLD AUTO: 4 % (ref 0–6)
ERYTHROCYTE [DISTWIDTH] IN BLOOD BY AUTOMATED COUNT: 15.9 % (ref 11.6–15.1)
GFR SERPL CREATININE-BSD FRML MDRD: 111 ML/MIN/1.73SQ M
GLUCOSE P FAST SERPL-MCNC: 94 MG/DL (ref 65–99)
HCT VFR BLD AUTO: 28.6 % (ref 36.5–49.3)
HGB BLD-MCNC: 8.9 G/DL (ref 12–17)
IMM GRANULOCYTES # BLD AUTO: 0.05 THOUSAND/UL (ref 0–0.2)
IMM GRANULOCYTES NFR BLD AUTO: 1 % (ref 0–2)
LYMPHOCYTES # BLD AUTO: 1.24 THOUSANDS/ΜL (ref 0.6–4.47)
LYMPHOCYTES NFR BLD AUTO: 15 % (ref 14–44)
MCH RBC QN AUTO: 29.4 PG (ref 26.8–34.3)
MCHC RBC AUTO-ENTMCNC: 31.1 G/DL (ref 31.4–37.4)
MCV RBC AUTO: 94 FL (ref 82–98)
MONOCYTES # BLD AUTO: 0.75 THOUSAND/ΜL (ref 0.17–1.22)
MONOCYTES NFR BLD AUTO: 9 % (ref 4–12)
NEUTROPHILS # BLD AUTO: 6.03 THOUSANDS/ΜL (ref 1.85–7.62)
NEUTS SEG NFR BLD AUTO: 70 % (ref 43–75)
NRBC BLD AUTO-RTO: 0 /100 WBCS
PLATELET # BLD AUTO: 523 THOUSANDS/UL (ref 149–390)
PMV BLD AUTO: 9.9 FL (ref 8.9–12.7)
POTASSIUM SERPL-SCNC: 4.3 MMOL/L (ref 3.5–5.3)
RBC # BLD AUTO: 3.03 MILLION/UL (ref 3.88–5.62)
SODIUM SERPL-SCNC: 137 MMOL/L (ref 135–147)
WBC # BLD AUTO: 8.45 THOUSAND/UL (ref 4.31–10.16)

## 2025-02-21 PROCEDURE — G0299 HHS/HOSPICE OF RN EA 15 MIN: HCPCS

## 2025-02-21 PROCEDURE — 400013 VN SOC

## 2025-02-21 PROCEDURE — G0151 HHCP-SERV OF PT,EA 15 MIN: HCPCS

## 2025-02-21 PROCEDURE — 80048 BASIC METABOLIC PNL TOTAL CA: CPT

## 2025-02-21 PROCEDURE — 36415 COLL VENOUS BLD VENIPUNCTURE: CPT

## 2025-02-21 PROCEDURE — 85025 COMPLETE CBC W/AUTO DIFF WBC: CPT

## 2025-02-24 ENCOUNTER — HOME CARE VISIT (OUTPATIENT)
Dept: HOME HEALTH SERVICES | Facility: HOME HEALTHCARE | Age: 65
End: 2025-02-24
Payer: COMMERCIAL

## 2025-02-27 ENCOUNTER — HOME CARE VISIT (OUTPATIENT)
Dept: HOME HEALTH SERVICES | Facility: HOME HEALTHCARE | Age: 65
End: 2025-02-27
Payer: COMMERCIAL

## 2025-02-27 VITALS
TEMPERATURE: 98.1 F | SYSTOLIC BLOOD PRESSURE: 120 MMHG | HEART RATE: 76 BPM | DIASTOLIC BLOOD PRESSURE: 72 MMHG | OXYGEN SATURATION: 97 % | RESPIRATION RATE: 20 BRPM

## 2025-02-27 VITALS — SYSTOLIC BLOOD PRESSURE: 118 MMHG | DIASTOLIC BLOOD PRESSURE: 62 MMHG | OXYGEN SATURATION: 97 % | HEART RATE: 86 BPM

## 2025-02-27 LAB
DME PARACHUTE DELIVERY DATE ACTUAL: NORMAL
DME PARACHUTE DELIVERY DATE REQUESTED: NORMAL
DME PARACHUTE ITEM DESCRIPTION: NORMAL
DME PARACHUTE ORDER STATUS: NORMAL
DME PARACHUTE SUPPLIER NAME: NORMAL
DME PARACHUTE SUPPLIER PHONE: NORMAL

## 2025-02-27 PROCEDURE — G0180 MD CERTIFICATION HHA PATIENT: HCPCS | Performed by: NURSE PRACTITIONER

## 2025-02-27 PROCEDURE — G0299 HHS/HOSPICE OF RN EA 15 MIN: HCPCS

## 2025-02-27 PROCEDURE — G0151 HHCP-SERV OF PT,EA 15 MIN: HCPCS

## 2025-03-03 ENCOUNTER — HOME CARE VISIT (OUTPATIENT)
Dept: HOME HEALTH SERVICES | Facility: HOME HEALTHCARE | Age: 65
End: 2025-03-03
Payer: COMMERCIAL

## 2025-03-03 VITALS — HEART RATE: 69 BPM | SYSTOLIC BLOOD PRESSURE: 130 MMHG | DIASTOLIC BLOOD PRESSURE: 80 MMHG | OXYGEN SATURATION: 99 %

## 2025-03-03 PROCEDURE — G0151 HHCP-SERV OF PT,EA 15 MIN: HCPCS

## 2025-03-05 ENCOUNTER — HOME CARE VISIT (OUTPATIENT)
Dept: HOME HEALTH SERVICES | Facility: HOME HEALTHCARE | Age: 65
End: 2025-03-05
Payer: COMMERCIAL

## 2025-03-05 PROCEDURE — G0299 HHS/HOSPICE OF RN EA 15 MIN: HCPCS

## 2025-03-06 VITALS
RESPIRATION RATE: 18 BRPM | SYSTOLIC BLOOD PRESSURE: 130 MMHG | HEART RATE: 95 BPM | OXYGEN SATURATION: 99 % | TEMPERATURE: 97.4 F | DIASTOLIC BLOOD PRESSURE: 78 MMHG

## 2025-03-10 PROBLEM — Z12.11 COLON CANCER SCREENING: Status: RESOLVED | Noted: 2025-02-08 | Resolved: 2025-03-10

## 2025-03-11 ENCOUNTER — OFFICE VISIT (OUTPATIENT)
Dept: PULMONOLOGY | Facility: CLINIC | Age: 65
End: 2025-03-11
Payer: COMMERCIAL

## 2025-03-11 VITALS
BODY MASS INDEX: 17.92 KG/M2 | OXYGEN SATURATION: 97 % | WEIGHT: 121 LBS | HEIGHT: 69 IN | SYSTOLIC BLOOD PRESSURE: 130 MMHG | TEMPERATURE: 97.7 F | DIASTOLIC BLOOD PRESSURE: 74 MMHG | HEART RATE: 54 BPM | RESPIRATION RATE: 18 BRPM

## 2025-03-11 DIAGNOSIS — J43.9 PULMONARY EMPHYSEMA, UNSPECIFIED EMPHYSEMA TYPE (HCC): ICD-10-CM

## 2025-03-11 DIAGNOSIS — J90 PLEURAL EFFUSION ON RIGHT: ICD-10-CM

## 2025-03-11 DIAGNOSIS — F17.211 CIGARETTE NICOTINE DEPENDENCE IN REMISSION: ICD-10-CM

## 2025-03-11 DIAGNOSIS — J18.1 RIGHT LOWER LOBE CONSOLIDATION (HCC): Primary | ICD-10-CM

## 2025-03-11 DIAGNOSIS — R91.8 RIGHT LOWER LOBE LUNG MASS: ICD-10-CM

## 2025-03-11 PROCEDURE — 99213 OFFICE O/P EST LOW 20 MIN: CPT

## 2025-03-11 NOTE — ASSESSMENT & PLAN NOTE
-Likely secondary to cirrhosis.  Improved with diuresis, did not require thoracentesis  -Will follow-up on resolution on next CT chest    Orders:    CT chest wo contrast; Future
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
## 2025-03-17 ENCOUNTER — OFFICE VISIT (OUTPATIENT)
Dept: FAMILY MEDICINE CLINIC | Facility: CLINIC | Age: 65
End: 2025-03-17
Payer: COMMERCIAL

## 2025-03-17 VITALS
SYSTOLIC BLOOD PRESSURE: 132 MMHG | OXYGEN SATURATION: 98 % | BODY MASS INDEX: 17.98 KG/M2 | WEIGHT: 121.4 LBS | HEIGHT: 69 IN | TEMPERATURE: 97.6 F | HEART RATE: 74 BPM | RESPIRATION RATE: 16 BRPM | DIASTOLIC BLOOD PRESSURE: 82 MMHG

## 2025-03-17 DIAGNOSIS — H10.33 ACUTE BACTERIAL CONJUNCTIVITIS OF BOTH EYES: Primary | ICD-10-CM

## 2025-03-17 PROCEDURE — 99213 OFFICE O/P EST LOW 20 MIN: CPT | Performed by: NURSE PRACTITIONER

## 2025-03-17 RX ORDER — TOBRAMYCIN 3 MG/ML
1 SOLUTION/ DROPS OPHTHALMIC
Qty: 5 ML | Refills: 0 | Status: SHIPPED | OUTPATIENT
Start: 2025-03-17 | End: 2025-03-22

## 2025-03-17 NOTE — PROGRESS NOTES
"Name: Christian Liz      : 1960      MRN: [id number]  Encounter Provider: LI Bartholomew  Encounter Date: 3/17/2025   Encounter department: [facility]  :  Assessment & Plan  Acute bacterial conjunctivitis of both eyes    Orders:    tobramycin (TOBREX) 0.3 % SOLN; Administer 1 drop to both eyes every 4 (four) hours while awake for 5 days           History of Present Illness   Here for 1 week of drainage from eyes, crusting in the morning, swelling, redness. Started with swelling around both eyes about 1 week ago, progressively worsening. No cold symptoms      Review of Systems   Constitutional:  Negative for fever.   HENT:  Negative for congestion, postnasal drip, rhinorrhea and sore throat.    Eyes:  Positive for discharge, redness and itching.       Objective   /82   Pulse 74   Temp 97.6 °F (36.4 °C)   Resp 16   Ht 5' 9\" (1.753 m)   Wt 55.1 kg (121 lb 6.4 oz)   SpO2 98%   BMI 17.93 kg/m²      Physical Exam  Vitals and nursing note reviewed. Exam conducted with a chaperone present (wife).   Constitutional:       General: He is not in acute distress.     Appearance: He is well-developed. He is not ill-appearing, toxic-appearing or diaphoretic.   Eyes:      Conjunctiva/sclera:      Right eye: Right conjunctiva is injected.      Left eye: Left conjunctiva is injected.      Comments: See attached picture   Pulmonary:      Effort: Pulmonary effort is normal. No respiratory distress.   Skin:     Coloration: Skin is not pale.   Neurological:      Mental Status: He is alert and oriented to person, place, and time.   Psychiatric:         Mood and Affect: Mood normal. Mood is not anxious.         Speech: Speech normal.         Behavior: Behavior normal. Behavior is cooperative.         Thought Content: Thought content normal.         Judgment: Judgment normal.             "

## 2025-03-18 ENCOUNTER — HOSPITAL ENCOUNTER (OUTPATIENT)
Dept: CT IMAGING | Facility: HOSPITAL | Age: 65
Discharge: HOME | End: 2025-03-18
Payer: COMMERCIAL

## 2025-03-18 ENCOUNTER — RESULTS FOLLOW-UP (OUTPATIENT)
Dept: PULMONOLOGY | Facility: CLINIC | Age: 65
End: 2025-03-18

## 2025-03-18 DIAGNOSIS — J90 PLEURAL EFFUSION ON RIGHT: ICD-10-CM

## 2025-03-18 DIAGNOSIS — J18.1 RIGHT LOWER LOBE CONSOLIDATION (HCC): Primary | ICD-10-CM

## 2025-03-18 DIAGNOSIS — J18.1 RIGHT LOWER LOBE CONSOLIDATION (HCC): ICD-10-CM

## 2025-03-18 PROCEDURE — 71250 CT THORAX DX C-: CPT
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
## 2025-03-19 ENCOUNTER — OFFICE VISIT (OUTPATIENT)
Age: 65
End: 2025-03-19
Payer: COMMERCIAL

## 2025-03-19 ENCOUNTER — APPOINTMENT (OUTPATIENT)
Age: 65
End: 2025-03-19
Payer: COMMERCIAL

## 2025-03-19 VITALS
OXYGEN SATURATION: 98 % | TEMPERATURE: 98.7 F | WEIGHT: 121 LBS | BODY MASS INDEX: 17.92 KG/M2 | HEIGHT: 69 IN | HEART RATE: 69 BPM | SYSTOLIC BLOOD PRESSURE: 132 MMHG | DIASTOLIC BLOOD PRESSURE: 84 MMHG | RESPIRATION RATE: 18 BRPM

## 2025-03-19 DIAGNOSIS — R63.4 UNINTENTIONAL WEIGHT LOSS: ICD-10-CM

## 2025-03-19 DIAGNOSIS — D62 ACUTE BLOOD LOSS ANEMIA: ICD-10-CM

## 2025-03-19 DIAGNOSIS — Z11.59 ENCOUNTER FOR HCV SCREENING TEST FOR LOW RISK PATIENT: ICD-10-CM

## 2025-03-19 DIAGNOSIS — K26.4 BLEEDING DUODENAL ULCER: Primary | ICD-10-CM

## 2025-03-19 DIAGNOSIS — K26.4 BLEEDING DUODENAL ULCER: ICD-10-CM

## 2025-03-19 LAB
ALBUMIN SERPL BCG-MCNC: 4 G/DL (ref 3.5–5)
ALP SERPL-CCNC: 91 U/L (ref 34–104)
ALT SERPL W P-5'-P-CCNC: 13 U/L (ref 7–52)
ANION GAP SERPL CALCULATED.3IONS-SCNC: 9 MMOL/L (ref 4–13)
AST SERPL W P-5'-P-CCNC: 20 U/L (ref 13–39)
BILIRUB SERPL-MCNC: 0.38 MG/DL (ref 0.2–1)
BUN SERPL-MCNC: 14 MG/DL (ref 5–25)
CALCIUM SERPL-MCNC: 9.5 MG/DL (ref 8.4–10.2)
CHLORIDE SERPL-SCNC: 100 MMOL/L (ref 96–108)
CO2 SERPL-SCNC: 28 MMOL/L (ref 21–32)
CREAT SERPL-MCNC: 0.61 MG/DL (ref 0.6–1.3)
ERYTHROCYTE [DISTWIDTH] IN BLOOD BY AUTOMATED COUNT: 14.3 % (ref 11.6–15.1)
FERRITIN SERPL-MCNC: 319 NG/ML (ref 24–336)
GFR SERPL CREATININE-BSD FRML MDRD: 105 ML/MIN/1.73SQ M
GLUCOSE SERPL-MCNC: 89 MG/DL (ref 65–140)
HCT VFR BLD AUTO: 40.4 % (ref 36.5–49.3)
HGB BLD-MCNC: 12.4 G/DL (ref 12–17)
MCH RBC QN AUTO: 29.3 PG (ref 26.8–34.3)
MCHC RBC AUTO-ENTMCNC: 30.7 G/DL (ref 31.4–37.4)
MCV RBC AUTO: 96 FL (ref 82–98)
PLATELET # BLD AUTO: 365 THOUSANDS/UL (ref 149–390)
PMV BLD AUTO: 9.4 FL (ref 8.9–12.7)
POTASSIUM SERPL-SCNC: 4.8 MMOL/L (ref 3.5–5.3)
PROT SERPL-MCNC: 6.7 G/DL (ref 6.4–8.4)
RBC # BLD AUTO: 4.23 MILLION/UL (ref 3.88–5.62)
SODIUM SERPL-SCNC: 137 MMOL/L (ref 135–147)
WBC # BLD AUTO: 5.53 THOUSAND/UL (ref 4.31–10.16)

## 2025-03-19 PROCEDURE — 82728 ASSAY OF FERRITIN: CPT

## 2025-03-19 PROCEDURE — 80053 COMPREHEN METABOLIC PANEL: CPT

## 2025-03-19 PROCEDURE — 85027 COMPLETE CBC AUTOMATED: CPT

## 2025-03-19 PROCEDURE — 83550 IRON BINDING TEST: CPT

## 2025-03-19 PROCEDURE — 86803 HEPATITIS C AB TEST: CPT

## 2025-03-19 PROCEDURE — 99214 OFFICE O/P EST MOD 30 MIN: CPT | Performed by: INTERNAL MEDICINE

## 2025-03-19 PROCEDURE — 83540 ASSAY OF IRON: CPT

## 2025-03-19 PROCEDURE — 36415 COLL VENOUS BLD VENIPUNCTURE: CPT

## 2025-03-19 RX ORDER — PANTOPRAZOLE SODIUM 40 MG/1
40 TABLET, DELAYED RELEASE ORAL
Qty: 60 TABLET | Refills: 2 | Status: SHIPPED | OUTPATIENT
Start: 2025-03-19 | End: 2025-06-17

## 2025-03-19 NOTE — TELEPHONE ENCOUNTER
Pt calling returning phone call. Informed of results. He is aware to repeat CT chest in 6 weeks. Provided central scheduling phone number for him to call and schedule.

## 2025-03-20 ENCOUNTER — TELEPHONE (OUTPATIENT)
Dept: FAMILY MEDICINE CLINIC | Facility: CLINIC | Age: 65
End: 2025-03-20

## 2025-03-20 ENCOUNTER — OFFICE VISIT (OUTPATIENT)
Dept: FAMILY MEDICINE CLINIC | Facility: CLINIC | Age: 65
End: 2025-03-20
Payer: COMMERCIAL

## 2025-03-20 ENCOUNTER — RESULTS FOLLOW-UP (OUTPATIENT)
Age: 65
End: 2025-03-20

## 2025-03-20 VITALS
DIASTOLIC BLOOD PRESSURE: 76 MMHG | HEIGHT: 69 IN | RESPIRATION RATE: 16 BRPM | HEART RATE: 71 BPM | WEIGHT: 122 LBS | TEMPERATURE: 98.4 F | BODY MASS INDEX: 18.07 KG/M2 | SYSTOLIC BLOOD PRESSURE: 124 MMHG

## 2025-03-20 DIAGNOSIS — R91.8 RIGHT LOWER LOBE LUNG MASS: ICD-10-CM

## 2025-03-20 DIAGNOSIS — K21.9 GASTROESOPHAGEAL REFLUX DISEASE, UNSPECIFIED WHETHER ESOPHAGITIS PRESENT: ICD-10-CM

## 2025-03-20 DIAGNOSIS — S22.43XD MULTIPLE CLOSED FRACTURES OF RIBS OF BOTH SIDES WITH ROUTINE HEALING, SUBSEQUENT ENCOUNTER: ICD-10-CM

## 2025-03-20 DIAGNOSIS — D64.9 ANEMIA, UNSPECIFIED TYPE: ICD-10-CM

## 2025-03-20 DIAGNOSIS — H04.123 DRY EYES: Primary | ICD-10-CM

## 2025-03-20 LAB
HCV AB SER QL: NORMAL
IRON SATN MFR SERPL: 45 % (ref 15–50)
IRON SERPL-MCNC: 142 UG/DL (ref 50–212)
TIBC SERPL-MCNC: 312.2 UG/DL (ref 250–450)
TRANSFERRIN SERPL-MCNC: 223 MG/DL (ref 203–362)
UIBC SERPL-MCNC: 170 UG/DL (ref 155–355)

## 2025-03-20 PROCEDURE — 99214 OFFICE O/P EST MOD 30 MIN: CPT | Performed by: NURSE PRACTITIONER

## 2025-03-20 RX ORDER — CYCLOSPORINE 0.5 MG/ML
1 EMULSION OPHTHALMIC 2 TIMES DAILY
Qty: 0.4 ML | Refills: 0 | Status: SHIPPED | OUTPATIENT
Start: 2025-03-20

## 2025-03-20 NOTE — PROGRESS NOTES
Name: Christian Liz      : 1960      MRN: [id number]  Encounter Provider: LI Juan  Encounter Date: 3/20/2025   Encounter department: [facility]  :  Assessment & Plan  Anemia, unspecified type         Dry eyes    Orders:    cycloSPORINE (Restasis) 0.05 % ophthalmic emulsion; Administer 1 drop to both eyes 2 (two) times a day    Multiple closed fractures of ribs of both sides with routine healing, subsequent encounter         Right lower lobe lung mass         Gastroesophageal reflux disease, unspecified whether esophagitis present                History of Present Illness   Patient presents for a follow up and his wife is accompanying him. Overall doing well. Has seen Pulmonary and GI. Will be having a repeat CT scan and EGD/Colonoscopy. Most recent labs have been reviewed today and reveal anemia has resolved.He does complain of right upper arm sensation change with difficulty writing at times. Of note he had fractured ribs, and the area of concern is in the right lower lung. This may be r/t trauma and may be a nerve issue. EMG has been discussed     Of note he is being treated for b/l conjunctivitis and his conjunctiva appear improved. Dr. Mariya Barrera has seen him today for the lower lid redness at my request. Restasis drops have been ordered and he will have an appointment with Dr. Melendez/Aimee jacobo next week, we have discussed cancelling the appointment if eyes are improved. He denies any vision changes     He is hoping to return to work for [employer] and we will reach out to Allison Lundy for the needed documentation       Review of Systems   Constitutional:  Negative for chills and fever.   HENT:  Negative for ear pain and sore throat.    Eyes:  Positive for redness. Negative for pain and visual disturbance.   Respiratory:  Negative for cough and shortness of breath.    Cardiovascular:  Negative for chest pain and palpitations.  "  Gastrointestinal:  Negative for abdominal pain and vomiting.   Genitourinary:  Negative for dysuria and hematuria.   Musculoskeletal:  Negative for arthralgias and back pain.   Skin:  Negative for color change and rash.   Neurological:  Negative for seizures and syncope.   All other systems reviewed and are negative.      Objective   /76   Pulse 71   Temp 98.4 °F (36.9 °C) (Temporal)   Resp 16   Ht 5' 9\" (1.753 m)   Wt 55.3 kg (122 lb)   BMI 18.02 kg/m²      Physical Exam  Vitals and nursing note reviewed.   Constitutional:       General: He is not in acute distress.     Appearance: Normal appearance. He is well-developed.   HENT:      Head: Normocephalic and atraumatic.      Nose: Nose normal.      Mouth/Throat:      Mouth: Mucous membranes are moist.   Eyes:      Conjunctiva/sclera: Conjunctivae normal.   Cardiovascular:      Rate and Rhythm: Normal rate and regular rhythm.      Heart sounds: No murmur heard.  Pulmonary:      Effort: Pulmonary effort is normal. No respiratory distress.      Breath sounds: Normal breath sounds.   Abdominal:      Palpations: Abdomen is soft.      Tenderness: There is no abdominal tenderness.   Musculoskeletal:         General: No swelling.      Cervical back: Neck supple.   Skin:     General: Skin is warm and dry.      Capillary Refill: Capillary refill takes less than 2 seconds.   Neurological:      Mental Status: He is alert and oriented to person, place, and time.   Psychiatric:         Mood and Affect: Mood normal.         Behavior: Behavior normal.         Thought Content: Thought content normal.         Judgment: Judgment normal.         "

## 2025-03-20 NOTE — TELEPHONE ENCOUNTER
Called Tabby at [employer] left a voice mail to return call so we can receive proper details on note needed to return to work.

## 2025-03-24 ENCOUNTER — TELEPHONE (OUTPATIENT)
Age: 65
End: 2025-03-24

## 2025-03-24 ENCOUNTER — OCCMED (OUTPATIENT)
Dept: URGENT CARE | Facility: CLINIC | Age: 65
End: 2025-03-24

## 2025-03-24 DIAGNOSIS — Z02.89 ENCOUNTER FOR PHYSICAL EXAMINATION RELATED TO EMPLOYMENT: Primary | ICD-10-CM

## 2025-03-24 NOTE — TELEPHONE ENCOUNTER
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

## 2025-03-24 NOTE — TELEPHONE ENCOUNTER
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 Patient will be dropping off the forms today.

## 2025-03-24 NOTE — TELEPHONE ENCOUNTER
Patient presented to office with OCC MED forms. Blank scanned in chart and placed in PCP folder up front.

## 2025-03-25 ENCOUNTER — TELEPHONE (OUTPATIENT)
Dept: GASTROENTEROLOGY | Facility: CLINIC | Age: 65
End: 2025-03-25

## 2025-03-25 NOTE — TELEPHONE ENCOUNTER
Left message that My-chart message from Dr. Velazquez was not read. Doctor wanted you to know:  Your blood work looks good!  Specifically your blood count (hemoglobin) is 12.4 which is adequate and your iron level is great as well.  You do not have hepatitis C virus either.  I will see you on the day of your procedures.  Have a great day!     Best,  Huyen Velazquez MD

## 2025-03-26 NOTE — TELEPHONE ENCOUNTER
Patient called inquiring about his forms to return to work.     He states his employer has not received them as of yet.     Please advise

## 2025-04-01 ENCOUNTER — TELEPHONE (OUTPATIENT)
Dept: SLEEP CENTER | Facility: CLINIC | Age: 65
End: 2025-04-01

## 2025-04-01 NOTE — TELEPHONE ENCOUNTER
Patient called requesting we fax his medical response to [employer] in [place] to [phone]. I faxed the paper work Attn. Ayush Brooks.

## 2025-04-01 NOTE — TELEPHONE ENCOUNTER
Pt asking about DOT form / advised pt showing in media it is completed by [employer] / called office spoke w/ Amy / we don't see anything dates of it being faxed. Per pt please fax to # on the bottom of form to Dr. Jazzy Brooks. / relayed to Amy , she will fax form now.     Thank you Amy !

## 2025-04-03 DIAGNOSIS — R31.29 MICROSCOPIC HEMATURIA: Primary | ICD-10-CM

## 2025-04-04 NOTE — TELEPHONE ENCOUNTER
Mary Jo, wife ot pt, called in inquiring if [employer] completed pt's DOT form?    Reviewed chart, form is complete.     Mary Jo inquires if they were faxed over to [employer]?     Also, Mary Jo is requesting if we could please make copies of the form, so they can have it for their records.     Please advise & call Mary Jo when forms are ready for pickup. Thank you!    Mary Jo: [phone]

## 2025-04-04 NOTE — TELEPHONE ENCOUNTER
Called spoke with wife let her know paper work has been given to [employer] and can be picked up up front

## 2025-04-29 ENCOUNTER — HOSPITAL ENCOUNTER (OUTPATIENT)
Dept: CT IMAGING | Facility: HOSPITAL | Age: 65
Discharge: HOME | End: 2025-04-29
Payer: COMMERCIAL

## 2025-04-29 DIAGNOSIS — J18.1 RIGHT LOWER LOBE CONSOLIDATION (HCC): ICD-10-CM

## 2025-04-29 PROCEDURE — 71250 CT THORAX DX C-: CPT

## 2025-05-01 DIAGNOSIS — D62 ACUTE BLOOD LOSS ANEMIA: ICD-10-CM

## 2025-05-01 DIAGNOSIS — K26.4 BLEEDING DUODENAL ULCER: ICD-10-CM

## 2025-05-01 RX ORDER — PANTOPRAZOLE SODIUM 40 MG/1
40 TABLET, DELAYED RELEASE ORAL
Qty: 180 TABLET | Refills: 1 | Status: SHIPPED | OUTPATIENT
Start: 2025-05-01 | End: 2025-10-28

## 2025-05-01 NOTE — TELEPHONE ENCOUNTER
Patient's wife is requesting to send prescription by fax to [facility] at [phone] or by phone [phone]. Thank you.

## 2025-05-05 ENCOUNTER — RESULTS FOLLOW-UP (OUTPATIENT)
Age: 65
End: 2025-05-05

## 2025-05-05 DIAGNOSIS — J18.1 RIGHT LOWER LOBE CONSOLIDATION (HCC): Primary | ICD-10-CM

## 2025-05-12 ENCOUNTER — ANESTHESIA (OUTPATIENT)
Dept: GASTROENTEROLOGY | Facility: HOSPITAL | Age: 65
End: 2025-05-12
Payer: COMMERCIAL

## 2025-05-12 ENCOUNTER — ANESTHESIA EVENT (OUTPATIENT)
Dept: GASTROENTEROLOGY | Facility: HOSPITAL | Age: 65
End: 2025-05-12
Payer: COMMERCIAL

## 2025-05-12 ENCOUNTER — HOSPITAL ENCOUNTER (OUTPATIENT)
Dept: GASTROENTEROLOGY | Facility: HOSPITAL | Age: 65
Setting detail: OUTPATIENT SURGERY
Discharge: HOME | End: 2025-05-12
Attending: STUDENT IN AN ORGANIZED HEALTH CARE EDUCATION/TRAINING PROGRAM
Payer: COMMERCIAL

## 2025-05-12 VITALS
TEMPERATURE: 97 F | SYSTOLIC BLOOD PRESSURE: 111 MMHG | BODY MASS INDEX: 17.77 KG/M2 | HEART RATE: 64 BPM | HEIGHT: 69 IN | DIASTOLIC BLOOD PRESSURE: 75 MMHG | RESPIRATION RATE: 12 BRPM | OXYGEN SATURATION: 96 % | WEIGHT: 120 LBS

## 2025-05-12 DIAGNOSIS — Z12.11 SCREENING FOR COLON CANCER: ICD-10-CM

## 2025-05-12 DIAGNOSIS — D62 ACUTE BLOOD LOSS ANEMIA: ICD-10-CM

## 2025-05-12 DIAGNOSIS — K26.4 BLEEDING DUODENAL ULCER: ICD-10-CM

## 2025-05-12 PROCEDURE — 88305 TISSUE EXAM BY PATHOLOGIST: CPT | Performed by: STUDENT IN AN ORGANIZED HEALTH CARE EDUCATION/TRAINING PROGRAM

## 2025-05-12 PROCEDURE — 45385 COLONOSCOPY W/LESION REMOVAL: CPT | Performed by: INTERNAL MEDICINE

## 2025-05-12 PROCEDURE — 43239 EGD BIOPSY SINGLE/MULTIPLE: CPT | Performed by: INTERNAL MEDICINE

## 2025-05-12 PROCEDURE — 45390 COLONOSCOPY W/RESECTION: CPT | Performed by: INTERNAL MEDICINE

## 2025-05-12 RX ORDER — PROPOFOL 10 MG/ML
INJECTION, EMULSION INTRAVENOUS AS NEEDED
Status: DISCONTINUED | OUTPATIENT
Start: 2025-05-12 | End: 2025-05-12

## 2025-05-12 RX ORDER — PROPOFOL 10 MG/ML
INJECTION, EMULSION INTRAVENOUS CONTINUOUS PRN
Status: DISCONTINUED | OUTPATIENT
Start: 2025-05-12 | End: 2025-05-12

## 2025-05-12 RX ORDER — PANTOPRAZOLE SODIUM 40 MG/1
40 TABLET, DELAYED RELEASE ORAL
Qty: 90 TABLET | Refills: 0 | Status: SHIPPED | OUTPATIENT
Start: 2025-05-12 | End: 2025-08-10

## 2025-05-12 RX ORDER — SODIUM CHLORIDE, SODIUM LACTATE, POTASSIUM CHLORIDE, CALCIUM CHLORIDE 600; 310; 30; 20 MG/100ML; MG/100ML; MG/100ML; MG/100ML
125 INJECTION, SOLUTION INTRAVENOUS CONTINUOUS
Status: DISCONTINUED | OUTPATIENT
Start: 2025-05-12 | End: 2025-05-16 | Stop reason: HOSPADM

## 2025-05-12 RX ADMIN — PROPOFOL 50 MG: 10 INJECTION, EMULSION INTRAVENOUS at 12:56

## 2025-05-12 RX ADMIN — PROPOFOL 150 MG: 10 INJECTION, EMULSION INTRAVENOUS at 12:53

## 2025-05-12 RX ADMIN — SODIUM CHLORIDE, SODIUM LACTATE, POTASSIUM CHLORIDE, AND CALCIUM CHLORIDE: .6; .31; .03; .02 INJECTION, SOLUTION INTRAVENOUS at 12:36

## 2025-05-12 RX ADMIN — PROPOFOL 50 MG: 10 INJECTION, EMULSION INTRAVENOUS at 13:02

## 2025-05-12 RX ADMIN — PROPOFOL 100 MCG/KG/MIN: 10 INJECTION, EMULSION INTRAVENOUS at 13:03

## 2025-05-12 NOTE — ANESTHESIA POSTPROCEDURE EVALUATION
Post-Op Assessment Note    CV Status:  Stable  Pain Score: 0    Pain management: adequate       Mental Status:  Alert and awake   Hydration Status:  Euvolemic   PONV Controlled:  Controlled   Airway Patency:  Patent     Post Op Vitals Reviewed: Yes    No anethesia notable event occurred.    Staff: CRNA, Anesthesiologist           Last Filed PACU Vitals:  Vitals Value Taken Time   Temp     Pulse 69    /76    Resp 20    SpO2 98               
No

## 2025-05-12 NOTE — H&P
"History and Physical - [facility]  Christian Liz 64 y.o. male MRN: [id number]                  HPI: Christian Liz is a 64 y.o. year old male who presents for gastric ulcer, IVETT, unintentional weight loss, crc screening      REVIEW OF SYSTEMS: Per the HPI, and otherwise unremarkable.    Historical Information   History reviewed. No pertinent past medical history.  Past Surgical History:   Procedure Laterality Date    IR EMBOLIZATION (SPECIFY VESSEL OR SITE)  2/10/2025     Social History   Social History     Substance and Sexual Activity   Alcohol Use Not Currently    Comment: previous beer     Social History     Substance and Sexual Activity   Drug Use Never     Social History     Tobacco Use   Smoking Status Former    Current packs/day: 0.25    Types: Cigarettes    Passive exposure: Past   Smokeless Tobacco Never     History reviewed. No pertinent family history.    Meds/Allergies       Current Outpatient Medications:     pantoprazole (PROTONIX) 40 mg tablet    cycloSPORINE (Restasis) 0.05 % ophthalmic emulsion    polyethylene glycol (GOLYTELY) 4000 mL solution    Current Facility-Administered Medications:     lactated ringers infusion, 125 mL/hr, Intravenous, Continuous    No Known Allergies    Objective     BP (!) 181/84   Pulse 72   Temp (!) 96.3 °F (35.7 °C) (Temporal)   Resp 18   Ht 5' 9\" (1.753 m)   Wt 54.4 kg (120 lb)   SpO2 99%   BMI 17.72 kg/m²       PHYSICAL EXAM    Gen: NAD  Head: NCAT  CV: RRR  CHEST: not in respiratory distress  ABD: soft, NT/ND  EXT: no edema      ASSESSMENT/PLAN:  This is a 64 y.o. year old male here for EGD/colonoscopy, and he is stable and optimized for his procedure.        "

## 2025-05-12 NOTE — ANESTHESIA PREPROCEDURE EVALUATION
Procedure:  COLONOSCOPY  EGD    Relevant Problems   CARDIO   (+) Primary hypertension      PULMONARY   (+) Pleural effusion on right      Behavioral Health   (+) Alcohol abuse      Orthopedic/Musculoskeletal   (+) Fracture of multiple ribs of both sides with routine healing   (+) Sternal manubrial dissociation, closed fracture      Other   (+) Hyponatremia      Lab Results   Component Value Date    SODIUM 137 03/19/2025    K 4.8 03/19/2025     03/19/2025    CO2 28 03/19/2025    AGAP 9 03/19/2025    BUN 14 03/19/2025    CREATININE 0.61 03/19/2025    GLUC 89 03/19/2025    GLUF 94 02/21/2025    CALCIUM 9.5 03/19/2025    AST 20 03/19/2025    ALT 13 03/19/2025    ALKPHOS 91 03/19/2025    TP 6.7 03/19/2025    TBILI 0.38 03/19/2025    EGFR 105 03/19/2025     Lab Results   Component Value Date    WBC 5.53 03/19/2025    HGB 12.4 03/19/2025    HCT 40.4 03/19/2025    MCV 96 03/19/2025     03/19/2025         Physical Exam    Airway    Mallampati score: II  TM Distance: >3 FB  Neck ROM: full     Dental       Cardiovascular      Pulmonary      Other Findings        Anesthesia Plan  ASA Score- 2     Anesthesia Type- IV sedation with anesthesia with ASA Monitors.         Additional Monitors:     Airway Plan:            Plan Factors-Exercise tolerance (METS): >4 METS.    Chart reviewed. EKG reviewed. Imaging results reviewed. Existing labs reviewed. Patient summary reviewed.                  Induction- intravenous.    Postoperative Plan-         Informed Consent- Anesthetic plan and risks discussed with patient.  I personally reviewed this patient with the CRNA. Discussed and agreed on the Anesthesia Plan with the CRNA..      NPO Status:  Vitals Value Taken Time   Date of last liquid 05/12/25 05/12/25 1206   Time of last liquid 0715 05/12/25 1206   Date of last solid 05/10/25 05/12/25 1206   Time of last solid 1900 05/12/25 1206

## 2025-05-15 PROCEDURE — 88305 TISSUE EXAM BY PATHOLOGIST: CPT | Performed by: STUDENT IN AN ORGANIZED HEALTH CARE EDUCATION/TRAINING PROGRAM

## 2025-05-16 ENCOUNTER — RESULTS FOLLOW-UP (OUTPATIENT)
Age: 65
End: 2025-05-16

## 2025-05-21 ENCOUNTER — OFFICE VISIT (OUTPATIENT)
Dept: FAMILY MEDICINE CLINIC | Facility: CLINIC | Age: 65
End: 2025-05-21
Payer: COMMERCIAL

## 2025-05-21 VITALS
BODY MASS INDEX: 18.96 KG/M2 | WEIGHT: 128 LBS | RESPIRATION RATE: 16 BRPM | HEART RATE: 68 BPM | DIASTOLIC BLOOD PRESSURE: 70 MMHG | TEMPERATURE: 98.5 F | HEIGHT: 69 IN | OXYGEN SATURATION: 98 % | SYSTOLIC BLOOD PRESSURE: 122 MMHG

## 2025-05-21 DIAGNOSIS — L89.159 PRESSURE INJURY OF SKIN OF SACRAL REGION, UNSPECIFIED INJURY STAGE: ICD-10-CM

## 2025-05-21 DIAGNOSIS — Z00.00 ANNUAL PHYSICAL EXAM: Primary | ICD-10-CM

## 2025-05-21 DIAGNOSIS — Z13.220 SCREENING FOR HYPERLIPIDEMIA: ICD-10-CM

## 2025-05-21 DIAGNOSIS — Z12.5 SCREENING FOR PROSTATE CANCER: ICD-10-CM

## 2025-05-21 DIAGNOSIS — R53.83 OTHER FATIGUE: ICD-10-CM

## 2025-05-21 DIAGNOSIS — K21.9 GASTROESOPHAGEAL REFLUX DISEASE, UNSPECIFIED WHETHER ESOPHAGITIS PRESENT: ICD-10-CM

## 2025-05-21 DIAGNOSIS — N20.0 LEFT NEPHROLITHIASIS: ICD-10-CM

## 2025-05-21 PROCEDURE — 99214 OFFICE O/P EST MOD 30 MIN: CPT | Performed by: NURSE PRACTITIONER

## 2025-05-21 PROCEDURE — 99396 PREV VISIT EST AGE 40-64: CPT | Performed by: NURSE PRACTITIONER

## 2025-05-21 RX ORDER — PREDNISOLONE ACETATE 10 MG/ML
SUSPENSION/ DROPS OPHTHALMIC
COMMUNITY
Start: 2025-03-20 | End: 2025-05-21

## 2025-05-21 RX ORDER — ERYTHROMYCIN 5 MG/G
OINTMENT OPHTHALMIC
COMMUNITY
Start: 2025-03-20 | End: 2025-05-21

## 2025-05-21 NOTE — PATIENT INSTRUCTIONS
"Patient Education     Routine physical for adults   The Basics   Written by the doctors and editors at [facility]   What is a physical? -- A physical is a routine visit, or \"check-up,\" with your doctor. You might also hear it called a \"wellness visit\" or \"preventive visit.\"  During each visit, the doctor will:   Ask about your physical and mental health   Ask about your habits, behaviors, and lifestyle   Do an exam   Give you vaccines if needed   Talk to you about any medicines you take   Give advice about your health   Answer your questions  Getting regular check-ups is an important part of taking care of your health. It can help your doctor find and treat any problems you have. But it's also important for preventing health problems.  A routine physical is different from a \"sick visit.\" A sick visit is when you see a doctor because of a health concern or problem. Since physicals are scheduled ahead of time, you can think about what you want to ask the doctor.  How often should I get a physical? -- It depends on your age and health. In general, for people age 21 years and older:   If you are younger than 50 years, you might be able to get a physical every 3 years.   If you are 50 years or older, your doctor might recommend a physical every year.  If you have an ongoing health condition, like diabetes or high blood pressure, your doctor will probably want to see you more often.  What happens during a physical? -- In general, each visit will include:   Physical exam - The doctor or nurse will check your height, weight, heart rate, and blood pressure. They will also look at your eyes and ears. They will ask about how you are feeling and whether you have any symptoms that bother you.   Medicines - It's a good idea to bring a list of all the medicines you take to each doctor visit. Your doctor will talk to you about your medicines and answer any questions. Tell them if you are having any side effects that bother you. You " "should also tell them if you are having trouble paying for any of your medicines.   Habits and behaviors - This includes:   Your diet   Your exercise habits   Whether you smoke, drink alcohol, or use drugs   Whether you are sexually active   Whether you feel safe at home  Your doctor will talk to you about things you can do to improve your health and lower your risk of health problems. They will also offer help and support. For example, if you want to quit smoking, they can give you advice and might prescribe medicines. If you want to improve your diet or get more physical activity, they can help you with this, too.   Lab tests, if needed - The tests you get will depend on your age and situation. For example, your doctor might want to check your:   Cholesterol   Blood sugar   Iron level   Vaccines - The recommended vaccines will depend on your age, health, and what vaccines you already had. Vaccines are very important because they can prevent certain serious or deadly infections.   Discussion of screening - \"Screening\" means checking for diseases or other health problems before they cause symptoms. Your doctor can recommend screening based on your age, risk, and preferences. This might include tests to check for:   Cancer, such as breast, prostate, cervical, ovarian, colorectal, prostate, lung, or skin cancer   Sexually transmitted infections, such as chlamydia and gonorrhea   Mental health conditions like depression and anxiety  Your doctor will talk to you about the different types of screening tests. They can help you decide which screenings to have. They can also explain what the results might mean.   Answering questions - The physical is a good time to ask the doctor or nurse questions about your health. If needed, they can refer you to other doctors or specialists, too.  Adults older than 65 years often need other care, too. As you get older, your doctor will talk to you about:   How to prevent falling at " home   Hearing or vision tests   Memory testing   How to take your medicines safely   Making sure that you have the help and support you need at home  All topics are updated as new evidence becomes available and our peer review process is complete.  This topic retrieved from [employer] on: May 02, 2024.  Topic 394786 Version 1.0  Release: 32.4.3 - C32.122  © 2024 UpToDate, Inc. and/or its affiliates. All rights reserved.  Consumer Information Use and Disclaimer   Disclaimer: This generalized information is a limited summary of diagnosis, treatment, and/or medication information. It is not meant to be comprehensive and should be used as a tool to help the user understand and/or assess potential diagnostic and treatment options. It does NOT include all information about conditions, treatments, medications, side effects, or risks that may apply to a specific patient. It is not intended to be medical advice or a substitute for the medical advice, diagnosis, or treatment of a health care provider based on the health care provider's examination and assessment of a patient's specific and unique circumstances. Patients must speak with a health care provider for complete information about their health, medical questions, and treatment options, including any risks or benefits regarding use of medications. This information does not endorse any treatments or medications as safe, effective, or approved for treating a specific patient. UpToDate, Inc. and its affiliates disclaim any warranty or liability relating to this information or the use thereof.The use of this information is governed by the Terms of Use, available at https://www.woltersArcadia Eco[employer]iesuwer.com/en/know/clinical-effectiveness-terms. 2024© UpToDate, Inc. and its affiliates and/or licensors. All rights reserved.  Copyright   © 2024 UpToDate, Inc. and/or its affiliates. All rights reserved.    Patient Education     Routine physical for adults   The Basics   Written by the  "doctors and editors at [facility]   What is a physical? -- A physical is a routine visit, or \"check-up,\" with your doctor. You might also hear it called a \"wellness visit\" or \"preventive visit.\"  During each visit, the doctor will:   Ask about your physical and mental health   Ask about your habits, behaviors, and lifestyle   Do an exam   Give you vaccines if needed   Talk to you about any medicines you take   Give advice about your health   Answer your questions  Getting regular check-ups is an important part of taking care of your health. It can help your doctor find and treat any problems you have. But it's also important for preventing health problems.  A routine physical is different from a \"sick visit.\" A sick visit is when you see a doctor because of a health concern or problem. Since physicals are scheduled ahead of time, you can think about what you want to ask the doctor.  How often should I get a physical? -- It depends on your age and health. In general, for people age 21 years and older:   If you are younger than 50 years, you might be able to get a physical every 3 years.   If you are 50 years or older, your doctor might recommend a physical every year.  If you have an ongoing health condition, like diabetes or high blood pressure, your doctor will probably want to see you more often.  What happens during a physical? -- In general, each visit will include:   Physical exam - The doctor or nurse will check your height, weight, heart rate, and blood pressure. They will also look at your eyes and ears. They will ask about how you are feeling and whether you have any symptoms that bother you.   Medicines - It's a good idea to bring a list of all the medicines you take to each doctor visit. Your doctor will talk to you about your medicines and answer any questions. Tell them if you are having any side effects that bother you. You should also tell them if you are having trouble paying for any of your " "medicines.   Habits and behaviors - This includes:   Your diet   Your exercise habits   Whether you smoke, drink alcohol, or use drugs   Whether you are sexually active   Whether you feel safe at home  Your doctor will talk to you about things you can do to improve your health and lower your risk of health problems. They will also offer help and support. For example, if you want to quit smoking, they can give you advice and might prescribe medicines. If you want to improve your diet or get more physical activity, they can help you with this, too.   Lab tests, if needed - The tests you get will depend on your age and situation. For example, your doctor might want to check your:   Cholesterol   Blood sugar   Iron level   Vaccines - The recommended vaccines will depend on your age, health, and what vaccines you already had. Vaccines are very important because they can prevent certain serious or deadly infections.   Discussion of screening - \"Screening\" means checking for diseases or other health problems before they cause symptoms. Your doctor can recommend screening based on your age, risk, and preferences. This might include tests to check for:   Cancer, such as breast, prostate, cervical, ovarian, colorectal, prostate, lung, or skin cancer   Sexually transmitted infections, such as chlamydia and gonorrhea   Mental health conditions like depression and anxiety  Your doctor will talk to you about the different types of screening tests. They can help you decide which screenings to have. They can also explain what the results might mean.   Answering questions - The physical is a good time to ask the doctor or nurse questions about your health. If needed, they can refer you to other doctors or specialists, too.  Adults older than 65 years often need other care, too. As you get older, your doctor will talk to you about:   How to prevent falling at home   Hearing or vision tests   Memory testing   How to take your " medicines safely   Making sure that you have the help and support you need at home  All topics are updated as new evidence becomes available and our peer review process is complete.  This topic retrieved from [employer] on: May 02, 2024.  Topic 082377 Version 1.0  Release: 32.4.3 - C32.122  © 2024 UpToDate, Inc. and/or its affiliates. All rights reserved.  Consumer Information Use and Disclaimer   Disclaimer: This generalized information is a limited summary of diagnosis, treatment, and/or medication information. It is not meant to be comprehensive and should be used as a tool to help the user understand and/or assess potential diagnostic and treatment options. It does NOT include all information about conditions, treatments, medications, side effects, or risks that may apply to a specific patient. It is not intended to be medical advice or a substitute for the medical advice, diagnosis, or treatment of a health care provider based on the health care provider's examination and assessment of a patient's specific and unique circumstances. Patients must speak with a health care provider for complete information about their health, medical questions, and treatment options, including any risks or benefits regarding use of medications. This information does not endorse any treatments or medications as safe, effective, or approved for treating a specific patient. UpToDate, Inc. and its affiliates disclaim any warranty or liability relating to this information or the use thereof.The use of this information is governed by the Terms of Use, available at https://www.wolterseyefactiveuwer.com/en/know/clinical-effectiveness-terms. 2024© UpToDate, Inc. and its affiliates and/or licensors. All rights reserved.  Copyright   © 2024 UpToDate, Inc. and/or its affiliates. All rights reserved.
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
## 2025-06-10 ENCOUNTER — OFFICE VISIT (OUTPATIENT)
Age: 65
End: 2025-06-10
Payer: COMMERCIAL

## 2025-06-10 VITALS
TEMPERATURE: 98 F | HEIGHT: 69 IN | SYSTOLIC BLOOD PRESSURE: 124 MMHG | HEART RATE: 67 BPM | DIASTOLIC BLOOD PRESSURE: 82 MMHG | BODY MASS INDEX: 19.55 KG/M2 | WEIGHT: 132 LBS | OXYGEN SATURATION: 98 %

## 2025-06-10 DIAGNOSIS — K26.4 BLEEDING DUODENAL ULCER: ICD-10-CM

## 2025-06-10 DIAGNOSIS — Z86.0100 HISTORY OF COLON POLYPS: Primary | ICD-10-CM

## 2025-06-10 DIAGNOSIS — R14.0 BLOATING: ICD-10-CM

## 2025-06-10 DIAGNOSIS — D62 ACUTE BLOOD LOSS ANEMIA: ICD-10-CM

## 2025-06-10 PROCEDURE — 99214 OFFICE O/P EST MOD 30 MIN: CPT | Performed by: INTERNAL MEDICINE

## 2025-06-10 RX ORDER — PANTOPRAZOLE SODIUM 20 MG/1
20 TABLET, DELAYED RELEASE ORAL
Qty: 90 TABLET | Refills: 3 | Status: SHIPPED | OUTPATIENT
Start: 2025-06-10 | End: 2026-06-10

## 2025-06-11 ENCOUNTER — OFFICE VISIT (OUTPATIENT)
Dept: PULMONOLOGY | Facility: CLINIC | Age: 65
End: 2025-06-11
Payer: COMMERCIAL

## 2025-06-11 VITALS
DIASTOLIC BLOOD PRESSURE: 80 MMHG | SYSTOLIC BLOOD PRESSURE: 142 MMHG | BODY MASS INDEX: 19.73 KG/M2 | WEIGHT: 133.2 LBS | OXYGEN SATURATION: 98 % | HEIGHT: 69 IN | TEMPERATURE: 97.5 F | HEART RATE: 66 BPM | RESPIRATION RATE: 18 BRPM

## 2025-06-11 DIAGNOSIS — J43.9 PULMONARY EMPHYSEMA, UNSPECIFIED EMPHYSEMA TYPE (HCC): Primary | ICD-10-CM

## 2025-06-11 DIAGNOSIS — F17.211 CIGARETTE NICOTINE DEPENDENCE IN REMISSION: ICD-10-CM

## 2025-06-11 DIAGNOSIS — J18.1 RIGHT LOWER LOBE CONSOLIDATION (HCC): ICD-10-CM

## 2025-06-11 PROCEDURE — 99213 OFFICE O/P EST LOW 20 MIN: CPT

## 2025-06-11 NOTE — PROGRESS NOTES
Follow-up  Visit - Pulmonary Medicine   Name: Christian Liz      : 1960      MRN: [id number]  Encounter Provider: LI Vasquez  Encounter Date: 2025   Encounter department: [facility]  :  Assessment & Plan  Pulmonary emphysema, unspecified emphysema type (HCC)  -Emphysematous changes on CT imaging  -Currently without any symptoms of cough, mucus, or shortness of breath  -Not on any bronchodilators  -Continue to monitor, if develops any new pulmonary symptoms, check PFTs       Right lower lobe consolidation (HCC)  -On follow-up CT chest 25 there was improvement of the right basilar consolidation, however persistent  -Prior pleural effusions resolved  -Will have patient schedule follow-up CT chest the end of this month to follow-up on continued resolution       Cigarette nicotine dependence in remission  -Approximately 48 PYH, quit 2025  -Encouraged patient to continue to refrain from smoking  -Meets criteria for yearly CT lung cancer screening, will begin after repeat CT imaging shows resolution of previous findings mentioned above         No follow-ups on file.    History of Present Illness   Christian Liz is a 64 y.o. male with a past medical history of hypertension and alcohol use who is here today for a follow-up visit. He was last seen in the office 3 months ago following hospitalization for GI bleed and a large masslike consolidation in RLL measuring up to 7 cm with surrounding groundglass opacities.  Treated for pneumonia, but other differentials include a possible mass versus abscess versus hematoma from recent rib fracture versus pulmonary laceration.  On follow-up CT chest 25 there was improvement of the right basilar consolidation. We are planning on repeating the CT scan to ensure continued resolution.  Patient continues to deny any symptoms of shortness of breath, cough, mucus, or wheezing.  He is not on any inhaler therapy.   "He has no fevers or chills.  No hemoptysis, night sweats, or unintentional weight loss.  He has continued to remain off his cigarettes since January 2025.    Review of Systems    Aside from what is mentioned in the HPI, ROS is otherwise negative         Medical History Reviewed by provider this encounter:     .    Objective   /80 (BP Location: Right arm, Patient Position: Sitting, Cuff Size: Standard)   Pulse 66   Temp 97.5 °F (36.4 °C) (Temporal)   Resp 18   Ht 5' 9\" (1.753 m)   Wt 60.4 kg (133 lb 3.2 oz)   SpO2 98%   BMI 19.67 kg/m²     Physical Exam  Vitals and nursing note reviewed.   Constitutional:       General: He is not in acute distress.     Appearance: Normal appearance. He is well-developed.     Cardiovascular:      Rate and Rhythm: Normal rate and regular rhythm.      Heart sounds: Normal heart sounds. No murmur heard.  Pulmonary:      Effort: Pulmonary effort is normal. No respiratory distress.      Breath sounds: Normal breath sounds. No decreased breath sounds, wheezing, rhonchi or rales.     Musculoskeletal:         General: No swelling.      Right lower leg: No edema.      Left lower leg: No edema.     Psychiatric:         Mood and Affect: Mood and affect normal.         Behavior: Behavior normal. Behavior is cooperative.           Diagnostic Data:  Labs: I personally reviewed the most recent laboratory data pertinent to today's visit.      Radiology results:  Radiology Results Review: I have reviewed radiology reports from 4/29/2025 including: CT chest.      PFT/spirometry results:  No results found for: \"FEV1\", \"FVC\", \"SKM4VSC\", \"TLC\", \"DLCO\"       Oximetry testing:      Other studies:      LI Vasquez      "

## 2025-07-02 ENCOUNTER — HOSPITAL ENCOUNTER (OUTPATIENT)
Dept: CT IMAGING | Facility: HOSPITAL | Age: 65
Discharge: HOME | End: 2025-07-02
Payer: COMMERCIAL

## 2025-07-02 DIAGNOSIS — J18.1 RIGHT LOWER LOBE CONSOLIDATION (HCC): ICD-10-CM

## 2025-07-02 PROCEDURE — 71250 CT THORAX DX C-: CPT

## 2025-07-10 DIAGNOSIS — F17.211 CIGARETTE NICOTINE DEPENDENCE IN REMISSION: Primary | ICD-10-CM
